# Patient Record
Sex: FEMALE | Race: BLACK OR AFRICAN AMERICAN | Employment: FULL TIME
[De-identification: names, ages, dates, MRNs, and addresses within clinical notes are randomized per-mention and may not be internally consistent; named-entity substitution may affect disease eponyms.]

---

## 2017-01-06 ENCOUNTER — PROCEDURE VISIT (OUTPATIENT)
Dept: PODIATRY | Facility: CLINIC | Age: 47
End: 2017-01-06

## 2017-01-06 VITALS
HEART RATE: 82 BPM | SYSTOLIC BLOOD PRESSURE: 125 MMHG | WEIGHT: 175 LBS | HEIGHT: 62 IN | BODY MASS INDEX: 32.2 KG/M2 | DIASTOLIC BLOOD PRESSURE: 75 MMHG

## 2017-01-06 DIAGNOSIS — M25.572 ACUTE LEFT ANKLE PAIN: ICD-10-CM

## 2017-01-06 DIAGNOSIS — M25.473 ANKLE EDEMA: ICD-10-CM

## 2017-01-06 DIAGNOSIS — S82.62XD CLOSED DISPLACED FRACTURE OF LATERAL MALLEOLUS OF LEFT FIBULA WITH ROUTINE HEALING, SUBSEQUENT ENCOUNTER: Primary | ICD-10-CM

## 2017-01-06 PROCEDURE — 73610 X-RAY EXAM OF ANKLE: CPT | Performed by: PODIATRIST

## 2017-01-06 PROCEDURE — 99213 OFFICE O/P EST LOW 20 MIN: CPT | Performed by: PODIATRIST

## 2017-01-06 RX ORDER — HYDROCODONE BITARTRATE AND ACETAMINOPHEN 5; 325 MG/1; MG/1
2 TABLET ORAL EVERY 6 HOURS PRN
Qty: 30 TABLET | Refills: 0 | Status: SHIPPED | OUTPATIENT
Start: 2017-01-06 | End: 2017-06-07 | Stop reason: SDUPTHER

## 2017-01-06 ASSESSMENT — ENCOUNTER SYMPTOMS
COLOR CHANGE: 0
NAUSEA: 0
BACK PAIN: 0
SHORTNESS OF BREATH: 0
DIARRHEA: 0

## 2017-02-02 ENCOUNTER — OFFICE VISIT (OUTPATIENT)
Dept: PODIATRY | Facility: CLINIC | Age: 47
End: 2017-02-02

## 2017-02-02 VITALS
HEIGHT: 62 IN | HEART RATE: 91 BPM | WEIGHT: 170 LBS | BODY MASS INDEX: 31.28 KG/M2 | SYSTOLIC BLOOD PRESSURE: 147 MMHG | DIASTOLIC BLOOD PRESSURE: 95 MMHG

## 2017-02-02 DIAGNOSIS — S82.62XD CLOSED DISPLACED FRACTURE OF LATERAL MALLEOLUS OF LEFT FIBULA WITH ROUTINE HEALING, SUBSEQUENT ENCOUNTER: Primary | ICD-10-CM

## 2017-02-02 DIAGNOSIS — M25.572 ACUTE LEFT ANKLE PAIN: ICD-10-CM

## 2017-02-02 DIAGNOSIS — M25.473 ANKLE EDEMA: ICD-10-CM

## 2017-02-02 PROCEDURE — 99213 OFFICE O/P EST LOW 20 MIN: CPT | Performed by: PODIATRIST

## 2017-02-02 PROCEDURE — 73610 X-RAY EXAM OF ANKLE: CPT | Performed by: PODIATRIST

## 2017-02-02 ASSESSMENT — ENCOUNTER SYMPTOMS
SHORTNESS OF BREATH: 0
COLOR CHANGE: 0
BACK PAIN: 0
NAUSEA: 0
DIARRHEA: 0

## 2017-02-16 ENCOUNTER — TELEPHONE (OUTPATIENT)
Dept: PODIATRY | Facility: CLINIC | Age: 47
End: 2017-02-16

## 2017-02-16 ENCOUNTER — OFFICE VISIT (OUTPATIENT)
Dept: PODIATRY | Facility: CLINIC | Age: 47
End: 2017-02-16

## 2017-02-16 VITALS — HEIGHT: 62 IN | WEIGHT: 170 LBS | BODY MASS INDEX: 31.28 KG/M2

## 2017-02-16 DIAGNOSIS — S82.62XD CLOSED DISPLACED FRACTURE OF LATERAL MALLEOLUS OF LEFT FIBULA WITH ROUTINE HEALING, SUBSEQUENT ENCOUNTER: Primary | ICD-10-CM

## 2017-02-16 DIAGNOSIS — M25.473 ANKLE EDEMA: ICD-10-CM

## 2017-02-16 DIAGNOSIS — M25.572 ACUTE LEFT ANKLE PAIN: ICD-10-CM

## 2017-02-16 PROCEDURE — 99213 OFFICE O/P EST LOW 20 MIN: CPT | Performed by: PODIATRIST

## 2017-02-16 PROCEDURE — 73610 X-RAY EXAM OF ANKLE: CPT | Performed by: PODIATRIST

## 2017-02-16 ASSESSMENT — ENCOUNTER SYMPTOMS
NAUSEA: 0
BACK PAIN: 0
COLOR CHANGE: 0
SHORTNESS OF BREATH: 0
DIARRHEA: 0

## 2017-03-16 ENCOUNTER — OFFICE VISIT (OUTPATIENT)
Dept: PODIATRY | Age: 47
End: 2017-03-16
Payer: COMMERCIAL

## 2017-03-16 VITALS
WEIGHT: 183 LBS | HEART RATE: 105 BPM | BODY MASS INDEX: 33.68 KG/M2 | SYSTOLIC BLOOD PRESSURE: 140 MMHG | DIASTOLIC BLOOD PRESSURE: 95 MMHG | HEIGHT: 62 IN

## 2017-03-16 DIAGNOSIS — M25.473 ANKLE EDEMA: ICD-10-CM

## 2017-03-16 DIAGNOSIS — S82.62XG CLOSED DISPLACED FRACTURE OF LATERAL MALLEOLUS OF LEFT FIBULA WITH DELAYED HEALING, SUBSEQUENT ENCOUNTER: Primary | ICD-10-CM

## 2017-03-16 DIAGNOSIS — M25.572 ACUTE LEFT ANKLE PAIN: ICD-10-CM

## 2017-03-16 PROCEDURE — 99213 OFFICE O/P EST LOW 20 MIN: CPT | Performed by: PODIATRIST

## 2017-03-16 PROCEDURE — 73610 X-RAY EXAM OF ANKLE: CPT | Performed by: PODIATRIST

## 2017-03-16 RX ORDER — HYDROCODONE BITARTRATE AND ACETAMINOPHEN 5; 325 MG/1; MG/1
2 TABLET ORAL EVERY 6 HOURS PRN
Qty: 30 TABLET | Refills: 0 | Status: SHIPPED | OUTPATIENT
Start: 2017-03-16 | End: 2017-05-25 | Stop reason: SDUPTHER

## 2017-03-16 ASSESSMENT — ENCOUNTER SYMPTOMS
DIARRHEA: 0
COLOR CHANGE: 0
NAUSEA: 0
BACK PAIN: 0
SHORTNESS OF BREATH: 0

## 2017-03-17 DIAGNOSIS — S76.219A GROIN STRAIN: ICD-10-CM

## 2017-03-17 DIAGNOSIS — S33.9XXS SPRAIN, LUMBOSACRAL, SEQUELA: ICD-10-CM

## 2017-03-20 RX ORDER — CYCLOBENZAPRINE HCL 10 MG
TABLET ORAL
Qty: 30 TABLET | Refills: 0 | Status: SHIPPED | OUTPATIENT
Start: 2017-03-20 | End: 2017-06-07 | Stop reason: SDUPTHER

## 2017-04-13 ENCOUNTER — TELEPHONE (OUTPATIENT)
Dept: PODIATRY | Age: 47
End: 2017-04-13

## 2017-04-13 ENCOUNTER — OFFICE VISIT (OUTPATIENT)
Dept: PODIATRY | Age: 47
End: 2017-04-13
Payer: COMMERCIAL

## 2017-04-13 VITALS
HEART RATE: 83 BPM | DIASTOLIC BLOOD PRESSURE: 91 MMHG | WEIGHT: 183 LBS | HEIGHT: 62 IN | SYSTOLIC BLOOD PRESSURE: 144 MMHG | BODY MASS INDEX: 33.68 KG/M2

## 2017-04-13 DIAGNOSIS — S82.62XG CLOSED DISPLACED FRACTURE OF LATERAL MALLEOLUS OF LEFT FIBULA WITH DELAYED HEALING, SUBSEQUENT ENCOUNTER: Primary | ICD-10-CM

## 2017-04-13 DIAGNOSIS — M25.572 ACUTE LEFT ANKLE PAIN: ICD-10-CM

## 2017-04-13 DIAGNOSIS — M25.473 ANKLE EDEMA: ICD-10-CM

## 2017-04-13 PROCEDURE — 73610 X-RAY EXAM OF ANKLE: CPT | Performed by: PODIATRIST

## 2017-04-13 PROCEDURE — L4350 ANKLE CONTROL ORTHO PRE OTS: HCPCS | Performed by: PODIATRIST

## 2017-04-13 PROCEDURE — 99213 OFFICE O/P EST LOW 20 MIN: CPT | Performed by: PODIATRIST

## 2017-04-13 ASSESSMENT — ENCOUNTER SYMPTOMS
SHORTNESS OF BREATH: 0
NAUSEA: 0
BACK PAIN: 0
COLOR CHANGE: 0
DIARRHEA: 0

## 2017-04-27 ENCOUNTER — OFFICE VISIT (OUTPATIENT)
Dept: PODIATRY | Age: 47
End: 2017-04-27
Payer: COMMERCIAL

## 2017-04-27 VITALS
WEIGHT: 183 LBS | HEIGHT: 62 IN | HEART RATE: 99 BPM | SYSTOLIC BLOOD PRESSURE: 128 MMHG | DIASTOLIC BLOOD PRESSURE: 90 MMHG | BODY MASS INDEX: 33.68 KG/M2

## 2017-04-27 DIAGNOSIS — M25.572 ACUTE LEFT ANKLE PAIN: ICD-10-CM

## 2017-04-27 DIAGNOSIS — S82.62XG CLOSED DISPLACED FRACTURE OF LATERAL MALLEOLUS OF LEFT FIBULA WITH DELAYED HEALING, SUBSEQUENT ENCOUNTER: Primary | ICD-10-CM

## 2017-04-27 DIAGNOSIS — M25.473 ANKLE EDEMA: ICD-10-CM

## 2017-04-27 PROCEDURE — 73610 X-RAY EXAM OF ANKLE: CPT | Performed by: PODIATRIST

## 2017-04-27 PROCEDURE — 99213 OFFICE O/P EST LOW 20 MIN: CPT | Performed by: PODIATRIST

## 2017-04-28 ASSESSMENT — ENCOUNTER SYMPTOMS
COLOR CHANGE: 0
NAUSEA: 0
DIARRHEA: 0
SHORTNESS OF BREATH: 0
BACK PAIN: 0

## 2017-05-25 ENCOUNTER — OFFICE VISIT (OUTPATIENT)
Dept: PODIATRY | Age: 47
End: 2017-05-25
Payer: COMMERCIAL

## 2017-05-25 VITALS
DIASTOLIC BLOOD PRESSURE: 97 MMHG | BODY MASS INDEX: 33.68 KG/M2 | SYSTOLIC BLOOD PRESSURE: 131 MMHG | HEIGHT: 62 IN | HEART RATE: 110 BPM | WEIGHT: 183 LBS

## 2017-05-25 DIAGNOSIS — M25.572 ACUTE LEFT ANKLE PAIN: ICD-10-CM

## 2017-05-25 DIAGNOSIS — S82.62XG CLOSED DISPLACED FRACTURE OF LATERAL MALLEOLUS OF LEFT FIBULA WITH DELAYED HEALING, SUBSEQUENT ENCOUNTER: Primary | ICD-10-CM

## 2017-05-25 DIAGNOSIS — M25.473 ANKLE EDEMA: ICD-10-CM

## 2017-05-25 PROCEDURE — 99213 OFFICE O/P EST LOW 20 MIN: CPT | Performed by: PODIATRIST

## 2017-05-25 RX ORDER — HYDROCODONE BITARTRATE AND ACETAMINOPHEN 5; 325 MG/1; MG/1
2 TABLET ORAL EVERY 6 HOURS PRN
Qty: 30 TABLET | Refills: 0 | Status: SHIPPED | OUTPATIENT
Start: 2017-05-25 | End: 2017-07-25 | Stop reason: SDUPTHER

## 2017-05-25 ASSESSMENT — ENCOUNTER SYMPTOMS
NAUSEA: 0
DIARRHEA: 0
COLOR CHANGE: 0
SHORTNESS OF BREATH: 0
BACK PAIN: 0

## 2017-06-07 ENCOUNTER — OFFICE VISIT (OUTPATIENT)
Dept: FAMILY MEDICINE CLINIC | Age: 47
End: 2017-06-07
Payer: COMMERCIAL

## 2017-06-07 VITALS
BODY MASS INDEX: 35.51 KG/M2 | SYSTOLIC BLOOD PRESSURE: 150 MMHG | WEIGHT: 193 LBS | HEART RATE: 87 BPM | DIASTOLIC BLOOD PRESSURE: 104 MMHG | HEIGHT: 62 IN

## 2017-06-07 DIAGNOSIS — Z87.81 HX OF FRACTURE OF ANKLE: ICD-10-CM

## 2017-06-07 DIAGNOSIS — M25.50 ARTHRALGIA OF MULTIPLE JOINTS: ICD-10-CM

## 2017-06-07 DIAGNOSIS — I10 ESSENTIAL HYPERTENSION: Primary | ICD-10-CM

## 2017-06-07 DIAGNOSIS — Z12.39 BREAST CANCER SCREENING: ICD-10-CM

## 2017-06-07 DIAGNOSIS — E01.0 THYROMEGALY: ICD-10-CM

## 2017-06-07 DIAGNOSIS — S33.9XXS SPRAIN, LUMBOSACRAL, SEQUELA: ICD-10-CM

## 2017-06-07 DIAGNOSIS — E66.09 NON MORBID OBESITY DUE TO EXCESS CALORIES: ICD-10-CM

## 2017-06-07 DIAGNOSIS — E78.2 MIXED HYPERLIPIDEMIA: ICD-10-CM

## 2017-06-07 PROCEDURE — 99215 OFFICE O/P EST HI 40 MIN: CPT | Performed by: FAMILY MEDICINE

## 2017-06-07 RX ORDER — SIMVASTATIN 20 MG
20 TABLET ORAL EVERY EVENING
Qty: 30 TABLET | Refills: 3 | Status: SHIPPED | OUTPATIENT
Start: 2017-06-07 | End: 2017-12-20 | Stop reason: SDUPTHER

## 2017-06-07 RX ORDER — CYCLOBENZAPRINE HCL 10 MG
10 TABLET ORAL NIGHTLY PRN
Qty: 30 TABLET | Refills: 1 | Status: SHIPPED | OUTPATIENT
Start: 2017-06-07 | End: 2017-09-25 | Stop reason: SDUPTHER

## 2017-06-07 RX ORDER — VERAPAMIL HYDROCHLORIDE 360 MG/1
360 CAPSULE, DELAYED RELEASE PELLETS ORAL NIGHTLY
Qty: 30 CAPSULE | Refills: 3 | Status: SHIPPED | OUTPATIENT
Start: 2017-06-07 | End: 2017-12-20 | Stop reason: SDUPTHER

## 2017-06-07 RX ORDER — HYDRALAZINE HYDROCHLORIDE 50 MG/1
50 TABLET, FILM COATED ORAL 3 TIMES DAILY
Qty: 90 TABLET | Refills: 3 | Status: SHIPPED | OUTPATIENT
Start: 2017-06-07 | End: 2017-12-18 | Stop reason: ALTCHOICE

## 2017-06-07 ASSESSMENT — PATIENT HEALTH QUESTIONNAIRE - PHQ9
1. LITTLE INTEREST OR PLEASURE IN DOING THINGS: 0
SUM OF ALL RESPONSES TO PHQ QUESTIONS 1-9: 0
SUM OF ALL RESPONSES TO PHQ9 QUESTIONS 1 & 2: 0
2. FEELING DOWN, DEPRESSED OR HOPELESS: 0

## 2017-06-08 ASSESSMENT — ENCOUNTER SYMPTOMS
TROUBLE SWALLOWING: 0
CHEST TIGHTNESS: 0
CONSTIPATION: 0
SORE THROAT: 0
COUGH: 0
RHINORRHEA: 0
ABDOMINAL PAIN: 0
NAUSEA: 0
BACK PAIN: 0
SHORTNESS OF BREATH: 0
DIARRHEA: 0

## 2017-06-26 ENCOUNTER — HOSPITAL ENCOUNTER (OUTPATIENT)
Age: 47
Discharge: HOME | End: 2017-06-26
Payer: COMMERCIAL

## 2017-06-26 DIAGNOSIS — M25.50 ARTHRALGIA OF MULTIPLE JOINTS: ICD-10-CM

## 2017-06-26 DIAGNOSIS — E01.0 THYROMEGALY: ICD-10-CM

## 2017-06-26 LAB
RHEUMATOID FACTOR: 11.2 IU/ML
SEDIMENTATION RATE, ERYTHROCYTE: 21 MM (ref 0–20)
THYROXINE, FREE: 0.68 NG/DL (ref 0.93–1.7)
TSH SERPL DL<=0.05 MIU/L-ACNC: 15.11 MIU/L (ref 0.3–5)

## 2017-06-26 PROCEDURE — 36415 COLL VENOUS BLD VENIPUNCTURE: CPT

## 2017-06-26 PROCEDURE — 85651 RBC SED RATE NONAUTOMATED: CPT

## 2017-06-26 PROCEDURE — 84443 ASSAY THYROID STIM HORMONE: CPT

## 2017-06-26 PROCEDURE — 86431 RHEUMATOID FACTOR QUANT: CPT

## 2017-06-26 PROCEDURE — 84439 ASSAY OF FREE THYROXINE: CPT

## 2017-06-26 PROCEDURE — 86038 ANTINUCLEAR ANTIBODIES: CPT

## 2017-06-27 LAB — ANTI-NUCLEAR ANTIBODY (ANA): NEGATIVE

## 2017-06-29 ENCOUNTER — HOSPITAL ENCOUNTER (OUTPATIENT)
Dept: MAMMOGRAPHY | Age: 47
Discharge: HOME | End: 2017-06-29
Payer: COMMERCIAL

## 2017-06-29 DIAGNOSIS — Z12.39 BREAST CANCER SCREENING: ICD-10-CM

## 2017-06-29 PROCEDURE — G0202 SCR MAMMO BI INCL CAD: HCPCS

## 2017-07-13 ENCOUNTER — OFFICE VISIT (OUTPATIENT)
Dept: FAMILY MEDICINE CLINIC | Age: 47
End: 2017-07-13
Payer: COMMERCIAL

## 2017-07-13 VITALS
HEIGHT: 62 IN | DIASTOLIC BLOOD PRESSURE: 60 MMHG | BODY MASS INDEX: 34.01 KG/M2 | WEIGHT: 184.8 LBS | SYSTOLIC BLOOD PRESSURE: 130 MMHG

## 2017-07-13 DIAGNOSIS — E01.0 THYROMEGALY: ICD-10-CM

## 2017-07-13 DIAGNOSIS — E03.8 OTHER SPECIFIED HYPOTHYROIDISM: Primary | ICD-10-CM

## 2017-07-13 DIAGNOSIS — S76.211D GROIN STRAIN, RIGHT, SUBSEQUENT ENCOUNTER: ICD-10-CM

## 2017-07-13 DIAGNOSIS — S33.9XXS SPRAIN, LUMBOSACRAL, SEQUELA: ICD-10-CM

## 2017-07-13 DIAGNOSIS — E78.2 MIXED HYPERLIPIDEMIA: ICD-10-CM

## 2017-07-13 DIAGNOSIS — S82.62XG CLOSED DISPLACED FRACTURE OF LATERAL MALLEOLUS OF LEFT FIBULA WITH DELAYED HEALING, SUBSEQUENT ENCOUNTER: ICD-10-CM

## 2017-07-13 DIAGNOSIS — M25.50 ARTHRALGIA OF MULTIPLE JOINTS: ICD-10-CM

## 2017-07-13 DIAGNOSIS — I10 ESSENTIAL HYPERTENSION: ICD-10-CM

## 2017-07-13 PROCEDURE — 99214 OFFICE O/P EST MOD 30 MIN: CPT | Performed by: FAMILY MEDICINE

## 2017-07-13 RX ORDER — LEVOTHYROXINE SODIUM 0.07 MG/1
75 TABLET ORAL DAILY
Qty: 30 TABLET | Refills: 3 | Status: SHIPPED | OUTPATIENT
Start: 2017-07-13 | End: 2017-09-25 | Stop reason: DRUGHIGH

## 2017-07-13 RX ORDER — HYDROCODONE BITARTRATE AND ACETAMINOPHEN 5; 325 MG/1; MG/1
1 TABLET ORAL 2 TIMES DAILY PRN
Qty: 30 TABLET | Refills: 0 | Status: SHIPPED | OUTPATIENT
Start: 2017-07-13 | End: 2017-07-25 | Stop reason: CLARIF

## 2017-07-19 ENCOUNTER — HOSPITAL ENCOUNTER (OUTPATIENT)
Dept: ULTRASOUND IMAGING | Age: 47
Discharge: HOME | End: 2017-07-19
Payer: COMMERCIAL

## 2017-07-19 DIAGNOSIS — E03.8 OTHER SPECIFIED HYPOTHYROIDISM: ICD-10-CM

## 2017-07-19 DIAGNOSIS — E01.0 THYROMEGALY: ICD-10-CM

## 2017-07-19 PROCEDURE — 76536 US EXAM OF HEAD AND NECK: CPT

## 2017-07-25 RX ORDER — HYDROCODONE BITARTRATE AND ACETAMINOPHEN 5; 325 MG/1; MG/1
1 TABLET ORAL 2 TIMES DAILY PRN
Qty: 14 TABLET | Refills: 0 | OUTPATIENT
Start: 2017-07-25 | End: 2017-07-25 | Stop reason: CLARIF

## 2017-07-25 RX ORDER — HYDROCODONE BITARTRATE AND ACETAMINOPHEN 5; 325 MG/1; MG/1
1 TABLET ORAL 2 TIMES DAILY PRN
Qty: 14 TABLET | Refills: 0 | Status: SHIPPED | OUTPATIENT
Start: 2017-07-25 | End: 2017-09-25 | Stop reason: SDUPTHER

## 2017-07-28 ASSESSMENT — ENCOUNTER SYMPTOMS
TROUBLE SWALLOWING: 0
SHORTNESS OF BREATH: 0
CHEST TIGHTNESS: 0
NAUSEA: 0
COUGH: 0
BACK PAIN: 1
CONSTIPATION: 0
ABDOMINAL PAIN: 0
DIARRHEA: 0
RHINORRHEA: 0
SORE THROAT: 0

## 2017-09-01 DIAGNOSIS — S82.62XG CLOSED DISPLACED FRACTURE OF LATERAL MALLEOLUS OF LEFT FIBULA WITH DELAYED HEALING, SUBSEQUENT ENCOUNTER: ICD-10-CM

## 2017-09-01 DIAGNOSIS — S33.9XXS SPRAIN, LUMBOSACRAL, SEQUELA: ICD-10-CM

## 2017-09-01 DIAGNOSIS — M25.50 ARTHRALGIA OF MULTIPLE JOINTS: ICD-10-CM

## 2017-09-01 RX ORDER — IBUPROFEN 800 MG/1
800 TABLET ORAL EVERY 8 HOURS PRN
Qty: 30 TABLET | Refills: 0 | Status: SHIPPED | OUTPATIENT
Start: 2017-09-01 | End: 2017-12-18 | Stop reason: ALTCHOICE

## 2017-09-14 DIAGNOSIS — M25.50 ARTHRALGIA OF MULTIPLE JOINTS: Primary | ICD-10-CM

## 2017-09-21 ENCOUNTER — HOSPITAL ENCOUNTER (OUTPATIENT)
Age: 47
Discharge: HOME | End: 2017-09-21
Payer: COMMERCIAL

## 2017-09-21 DIAGNOSIS — E03.9 HYPOTHYROIDISM, UNSPECIFIED TYPE: ICD-10-CM

## 2017-09-21 DIAGNOSIS — E01.0 THYROMEGALY: ICD-10-CM

## 2017-09-21 LAB
THYROXINE, FREE: 0.83 NG/DL (ref 0.93–1.7)
TSH SERPL DL<=0.05 MIU/L-ACNC: 5.81 MIU/L (ref 0.3–5)

## 2017-09-21 PROCEDURE — 84439 ASSAY OF FREE THYROXINE: CPT

## 2017-09-21 PROCEDURE — 84443 ASSAY THYROID STIM HORMONE: CPT

## 2017-09-21 PROCEDURE — 36415 COLL VENOUS BLD VENIPUNCTURE: CPT

## 2017-09-25 ENCOUNTER — OFFICE VISIT (OUTPATIENT)
Dept: FAMILY MEDICINE CLINIC | Age: 47
End: 2017-09-25
Payer: COMMERCIAL

## 2017-09-25 VITALS
RESPIRATION RATE: 16 BRPM | TEMPERATURE: 98.3 F | HEART RATE: 78 BPM | SYSTOLIC BLOOD PRESSURE: 128 MMHG | BODY MASS INDEX: 33.09 KG/M2 | WEIGHT: 179.8 LBS | DIASTOLIC BLOOD PRESSURE: 68 MMHG | HEIGHT: 62 IN

## 2017-09-25 DIAGNOSIS — R26.81 UNSTABLE GAIT: ICD-10-CM

## 2017-09-25 DIAGNOSIS — S73.101D HIP SPRAIN, RIGHT, SUBSEQUENT ENCOUNTER: ICD-10-CM

## 2017-09-25 DIAGNOSIS — M25.50 ARTHRALGIA OF MULTIPLE JOINTS: ICD-10-CM

## 2017-09-25 DIAGNOSIS — E03.9 HYPOTHYROIDISM, UNSPECIFIED TYPE: ICD-10-CM

## 2017-09-25 DIAGNOSIS — I10 ESSENTIAL HYPERTENSION: ICD-10-CM

## 2017-09-25 DIAGNOSIS — M47.817 DJD (DEGENERATIVE JOINT DISEASE), LUMBOSACRAL: Primary | ICD-10-CM

## 2017-09-25 DIAGNOSIS — S33.9XXS SPRAIN, LUMBOSACRAL, SEQUELA: ICD-10-CM

## 2017-09-25 DIAGNOSIS — S76.211D GROIN STRAIN, RIGHT, SUBSEQUENT ENCOUNTER: ICD-10-CM

## 2017-09-25 PROCEDURE — 99214 OFFICE O/P EST MOD 30 MIN: CPT | Performed by: FAMILY MEDICINE

## 2017-09-25 RX ORDER — LEVOTHYROXINE SODIUM 0.1 MG/1
100 TABLET ORAL DAILY
Qty: 30 TABLET | Refills: 3 | Status: SHIPPED | OUTPATIENT
Start: 2017-09-25 | End: 2018-01-27 | Stop reason: SDUPTHER

## 2017-09-25 RX ORDER — CYCLOBENZAPRINE HCL 10 MG
TABLET ORAL
Qty: 30 TABLET | Refills: 0 | Status: SHIPPED | OUTPATIENT
Start: 2017-09-25 | End: 2017-12-27 | Stop reason: SDUPTHER

## 2017-09-25 RX ORDER — HYDROCODONE BITARTRATE AND ACETAMINOPHEN 5; 325 MG/1; MG/1
1 TABLET ORAL 2 TIMES DAILY PRN
Qty: 20 TABLET | Refills: 0 | Status: SHIPPED | OUTPATIENT
Start: 2017-09-25 | End: 2017-12-18 | Stop reason: SDUPTHER

## 2017-09-30 ASSESSMENT — ENCOUNTER SYMPTOMS
SHORTNESS OF BREATH: 0
DIARRHEA: 0
ABDOMINAL PAIN: 0
NAUSEA: 0
SORE THROAT: 0
RHINORRHEA: 0
COUGH: 0
TROUBLE SWALLOWING: 0
CONSTIPATION: 0
BACK PAIN: 0
CHEST TIGHTNESS: 0

## 2017-12-18 ENCOUNTER — OFFICE VISIT (OUTPATIENT)
Dept: FAMILY MEDICINE CLINIC | Age: 47
End: 2017-12-18
Payer: COMMERCIAL

## 2017-12-18 VITALS
BODY MASS INDEX: 32.68 KG/M2 | SYSTOLIC BLOOD PRESSURE: 128 MMHG | HEIGHT: 62 IN | HEART RATE: 76 BPM | DIASTOLIC BLOOD PRESSURE: 89 MMHG | WEIGHT: 177.6 LBS

## 2017-12-18 DIAGNOSIS — M25.50 ARTHRALGIA OF MULTIPLE JOINTS: ICD-10-CM

## 2017-12-18 DIAGNOSIS — E66.09 NON MORBID OBESITY DUE TO EXCESS CALORIES: ICD-10-CM

## 2017-12-18 DIAGNOSIS — I10 ESSENTIAL HYPERTENSION: Primary | ICD-10-CM

## 2017-12-18 DIAGNOSIS — M32.19 OTHER SYSTEMIC LUPUS ERYTHEMATOSUS WITH OTHER ORGAN INVOLVEMENT (HCC): ICD-10-CM

## 2017-12-18 DIAGNOSIS — E78.2 MIXED HYPERLIPIDEMIA: ICD-10-CM

## 2017-12-18 DIAGNOSIS — E03.9 HYPOTHYROIDISM, UNSPECIFIED TYPE: ICD-10-CM

## 2017-12-18 DIAGNOSIS — M47.817 DJD (DEGENERATIVE JOINT DISEASE), LUMBOSACRAL: ICD-10-CM

## 2017-12-18 DIAGNOSIS — S76.211D GROIN STRAIN, RIGHT, SUBSEQUENT ENCOUNTER: ICD-10-CM

## 2017-12-18 PROBLEM — M32.9 SYSTEMIC LUPUS ERYTHEMATOSUS (HCC): Status: ACTIVE | Noted: 2017-12-18

## 2017-12-18 PROCEDURE — 99214 OFFICE O/P EST MOD 30 MIN: CPT | Performed by: FAMILY MEDICINE

## 2017-12-18 RX ORDER — HYDROCODONE BITARTRATE AND ACETAMINOPHEN 5; 325 MG/1; MG/1
1 TABLET ORAL 2 TIMES DAILY PRN
Qty: 20 TABLET | Refills: 0 | Status: SHIPPED | OUTPATIENT
Start: 2017-12-18

## 2017-12-18 RX ORDER — METOPROLOL TARTRATE 50 MG/1
50 TABLET, FILM COATED ORAL 2 TIMES DAILY
Qty: 60 TABLET | Refills: 3 | Status: SHIPPED | OUTPATIENT
Start: 2017-12-18 | End: 2018-03-19 | Stop reason: SDUPTHER

## 2017-12-18 RX ORDER — HYDROXYCHLOROQUINE SULFATE 200 MG/1
TABLET, FILM COATED ORAL
Refills: 3 | COMMUNITY
Start: 2017-11-28 | End: 2018-03-19 | Stop reason: SDUPTHER

## 2017-12-18 ASSESSMENT — ENCOUNTER SYMPTOMS
COUGH: 0
CHEST TIGHTNESS: 0
CONSTIPATION: 0
SORE THROAT: 0
NAUSEA: 0
SHORTNESS OF BREATH: 0
ABDOMINAL PAIN: 0
BACK PAIN: 1
RHINORRHEA: 0
TROUBLE SWALLOWING: 0
DIARRHEA: 0

## 2017-12-18 NOTE — PROGRESS NOTES
palpable. Musculoskeletal:   Legs show no edema. Both ankles move well. There is mild tenderness on flexion and extension of both knees. SLR almost 60°. There is ongoing tenderness in the right groin along the medial aspect. Internal and external rotation of the hip is full. She is able to lie down and sit up. This tenderness over the lumbar spine and paraspinal muscles. No tremors noted.  is strong. Hands and fingers show no swelling or tenderness. Wrist movements are full. Both elbows move well. No specific tenderness is noted. Shoulder movements appear full. Her gait is slow but appears stable. Lymphadenopathy:     She has no cervical adenopathy. Neurological: She is alert and oriented to person, place, and time. Coordination normal.   Skin: Skin is warm and dry. No rash noted. Psychiatric: She has a normal mood and affect. Her behavior is normal. Thought content normal.   Nursing note and vitals reviewed. /89 (Site: Right Arm, Position: Sitting, Cuff Size: Large Adult)   Pulse 76   Ht 5' 2\" (1.575 m)   Wt 177 lb 9.6 oz (80.6 kg)   LMP  (LMP Unknown)   Breastfeeding? No   BMI 32.48 kg/m²     Assessment:      1. Essential hypertension  metoprolol tartrate (LOPRESSOR) 50 MG tablet   2. Mixed hyperlipidemia     3. Hypothyroidism, unspecified type  T4, Free    TSH without Reflex   4. Arthralgia of multiple joints     5. Other systemic lupus erythematosus with other organ involvement ([facility] [address]     6. DJD (degenerative joint disease), lumbosacral  HYDROcodone-acetaminophen (NORCO) 5-325 MG per tablet   7. Groin strain, right, subsequent encounter  HYDROcodone-acetaminophen (NORCO) 5-325 MG per tablet   8. Non morbid obesity due to excess calories         Plan:      Return in about 3 months (around 3/18/2018).     Orders Placed This Encounter   Procedures    T4, Free     Standing Status:   Future     Standing Expiration Date:   12/18/2018    TSH without Reflex     Standing Status: maintenance  6. Continue current medications, diet and exercise. Completed Refills   Requested Prescriptions     Signed Prescriptions Disp Refills    metoprolol tartrate (LOPRESSOR) 50 MG tablet 60 tablet 3     Sig: Take 1 tablet by mouth 2 times daily    HYDROcodone-acetaminophen (NORCO) 5-325 MG per tablet 20 tablet 0     Sig: Take 1 tablet by mouth 2 times daily as needed for Pain .      Electronically signed by Stacy Carbajal MD on 12/18/2017 at 2:39 PM

## 2017-12-18 NOTE — PROGRESS NOTES
Chronic Disease Visit Information    BP Readings from Last 3 Encounters:   09/25/17 128/68   07/13/17 130/60   06/07/17 (!) 150/104          Hemoglobin A1C (%)   Date Value   07/14/2015 5.6     LDL Cholesterol (mg/dL)   Date Value   03/09/2017 112     HDL (mg/dL)   Date Value   03/09/2017 77     BUN (mg/dL)   Date Value   03/09/2017 13     CREATININE (mg/dL)   Date Value   03/09/2017 0.60     Glucose (mg/dL)   Date Value   03/09/2017 109 (H)   05/05/2012 88            Have you changed or started any medications since your last visit including any over-the-counter medicines, vitamins, or herbal medicines? no   Are you having any side effects from any of your medications? -  no  Have you stopped taking any of your medications? Is so, why? -  no    Have you seen any other physician or provider since your last visit? Yes - Records Obtained  Have you had any other diagnostic tests since your last visit? Yes - Records Obtained  Have you been seen in the emergency room and/or had an admission to a hospital since we last saw you? No  Have you had your annual diabetic retinal (eye) exam? No  Have you had your routine dental cleaning in the past 6 months? no    Have you activated your [employer] account? If not, what are your barriers?  Yes     Patient Care Team:  Durga Shearer MD as PCP - General (Family Medicine)  Durga Shearer MD as PCP - S Attributed Provider         Medical History Review  Past Medical, Family, and Social History reviewed and does contribute to the patient presenting condition    Health Maintenance   Topic Date Due    Cervical cancer screen  06/18/2016    Flu vaccine (1) 09/03/2018 (Originally 9/1/2017)    DTaP/Tdap/Td vaccine (1 - Tdap) 01/19/2019 (Originally 7/14/1987)    HIV screen  01/19/2019 (Originally 7/14/1983)    Breast cancer screen  06/29/2018    Diabetes screen  07/14/2018    Lipid screen  03/09/2022

## 2017-12-20 DIAGNOSIS — I10 ESSENTIAL HYPERTENSION: ICD-10-CM

## 2017-12-20 DIAGNOSIS — E78.2 MIXED HYPERLIPIDEMIA: ICD-10-CM

## 2017-12-21 DIAGNOSIS — E03.9 HYPOTHYROIDISM, UNSPECIFIED TYPE: ICD-10-CM

## 2017-12-21 LAB — T4 FREE: 0.99

## 2017-12-22 RX ORDER — SIMVASTATIN 20 MG
20 TABLET ORAL EVERY EVENING
Qty: 30 TABLET | Refills: 0 | Status: SHIPPED | OUTPATIENT
Start: 2017-12-22 | End: 2018-01-27 | Stop reason: SDUPTHER

## 2017-12-22 RX ORDER — VERAPAMIL HYDROCHLORIDE 360 MG/1
CAPSULE, DELAYED RELEASE PELLETS ORAL
Qty: 30 CAPSULE | Refills: 0 | Status: SHIPPED | OUTPATIENT
Start: 2017-12-22 | End: 2018-01-27 | Stop reason: SDUPTHER

## 2017-12-26 DIAGNOSIS — E03.9 HYPOTHYROIDISM, UNSPECIFIED TYPE: ICD-10-CM

## 2017-12-26 LAB — TSH SERPL DL<=0.05 MIU/L-ACNC: 0.9 UIU/ML

## 2017-12-27 DIAGNOSIS — S33.9XXS SPRAIN, LUMBOSACRAL, SEQUELA: ICD-10-CM

## 2017-12-27 RX ORDER — CYCLOBENZAPRINE HCL 10 MG
TABLET ORAL
Qty: 30 TABLET | Refills: 0 | Status: SHIPPED | OUTPATIENT
Start: 2017-12-27 | End: 2018-02-12 | Stop reason: SDUPTHER

## 2018-01-15 ENCOUNTER — TELEPHONE (OUTPATIENT)
Dept: FAMILY MEDICINE CLINIC | Age: 48
End: 2018-01-15

## 2018-01-27 DIAGNOSIS — I10 ESSENTIAL HYPERTENSION: ICD-10-CM

## 2018-01-27 DIAGNOSIS — E03.9 HYPOTHYROIDISM, UNSPECIFIED TYPE: ICD-10-CM

## 2018-01-27 DIAGNOSIS — E78.2 MIXED HYPERLIPIDEMIA: ICD-10-CM

## 2018-01-29 RX ORDER — SIMVASTATIN 20 MG
20 TABLET ORAL EVERY EVENING
Qty: 30 TABLET | Refills: 0 | Status: SHIPPED | OUTPATIENT
Start: 2018-01-29 | End: 2018-03-01 | Stop reason: SDUPTHER

## 2018-01-29 RX ORDER — VERAPAMIL HYDROCHLORIDE 360 MG/1
CAPSULE, DELAYED RELEASE PELLETS ORAL
Qty: 30 CAPSULE | Refills: 0 | Status: SHIPPED | OUTPATIENT
Start: 2018-01-29 | End: 2018-02-26 | Stop reason: SDUPTHER

## 2018-01-29 RX ORDER — LEVOTHYROXINE SODIUM 0.1 MG/1
100 TABLET ORAL DAILY
Qty: 30 TABLET | Refills: 0 | Status: SHIPPED | OUTPATIENT
Start: 2018-01-29 | End: 2018-02-26 | Stop reason: SDUPTHER

## 2018-02-12 DIAGNOSIS — S33.9XXS SPRAIN, LUMBOSACRAL, SEQUELA: ICD-10-CM

## 2018-02-12 RX ORDER — CYCLOBENZAPRINE HCL 10 MG
TABLET ORAL
Qty: 30 TABLET | Refills: 0 | Status: SHIPPED | OUTPATIENT
Start: 2018-02-12 | End: 2018-03-08 | Stop reason: SDUPTHER

## 2018-02-26 DIAGNOSIS — E03.9 HYPOTHYROIDISM, UNSPECIFIED TYPE: ICD-10-CM

## 2018-02-26 DIAGNOSIS — I10 ESSENTIAL HYPERTENSION: ICD-10-CM

## 2018-02-27 RX ORDER — VERAPAMIL HYDROCHLORIDE 360 MG/1
CAPSULE, DELAYED RELEASE PELLETS ORAL
Qty: 30 CAPSULE | Refills: 0 | Status: SHIPPED | OUTPATIENT
Start: 2018-02-27 | End: 2018-03-19 | Stop reason: SDUPTHER

## 2018-02-27 RX ORDER — LEVOTHYROXINE SODIUM 0.1 MG/1
100 TABLET ORAL DAILY
Qty: 30 TABLET | Refills: 0 | Status: SHIPPED | OUTPATIENT
Start: 2018-02-27 | End: 2018-03-19 | Stop reason: SDUPTHER

## 2018-03-01 DIAGNOSIS — E78.2 MIXED HYPERLIPIDEMIA: ICD-10-CM

## 2018-03-02 RX ORDER — SIMVASTATIN 20 MG
20 TABLET ORAL EVERY EVENING
Qty: 30 TABLET | Refills: 0 | Status: SHIPPED | OUTPATIENT
Start: 2018-03-02 | End: 2018-03-19 | Stop reason: SDUPTHER

## 2018-03-08 DIAGNOSIS — S33.9XXS SPRAIN, LUMBOSACRAL, SEQUELA: ICD-10-CM

## 2018-03-09 ENCOUNTER — APPOINTMENT (OUTPATIENT)
Dept: GENERAL RADIOLOGY | Age: 48
End: 2018-03-09
Payer: COMMERCIAL

## 2018-03-09 ENCOUNTER — HOSPITAL ENCOUNTER (OUTPATIENT)
Age: 48
Setting detail: OBSERVATION
Discharge: HOME | End: 2018-03-10
Attending: EMERGENCY MEDICINE | Admitting: EMERGENCY MEDICINE
Payer: COMMERCIAL

## 2018-03-09 DIAGNOSIS — R55 NEAR SYNCOPE: Primary | ICD-10-CM

## 2018-03-09 DIAGNOSIS — R94.31 PROLONGED QT INTERVAL: ICD-10-CM

## 2018-03-09 LAB
ABSOLUTE EOS #: <0.03 K/UL (ref 0–0.44)
ABSOLUTE IMMATURE GRANULOCYTE: <0.03 K/UL (ref 0–0.3)
ABSOLUTE LYMPH #: 0.78 K/UL (ref 1.1–3.7)
ABSOLUTE MONO #: 0.3 K/UL (ref 0.1–1.2)
ANION GAP SERPL CALCULATED.3IONS-SCNC: 12 MMOL/L (ref 9–17)
BASOPHILS # BLD: 0 % (ref 0–2)
BASOPHILS ABSOLUTE: <0.03 K/UL (ref 0–0.2)
BUN BLDV-MCNC: 14 MG/DL (ref 6–20)
BUN/CREAT BLD: ABNORMAL (ref 9–20)
CALCIUM SERPL-MCNC: 9.5 MG/DL (ref 8.6–10.4)
CHLORIDE BLD-SCNC: 101 MMOL/L (ref 98–107)
CO2: 26 MMOL/L (ref 20–31)
CREAT SERPL-MCNC: 0.54 MG/DL (ref 0.5–0.9)
DIFFERENTIAL TYPE: ABNORMAL
EOSINOPHILS RELATIVE PERCENT: 0 % (ref 1–4)
GFR AFRICAN AMERICAN: >60 ML/MIN
GFR NON-AFRICAN AMERICAN: >60 ML/MIN
GFR SERPL CREATININE-BSD FRML MDRD: ABNORMAL ML/MIN/{1.73_M2}
GFR SERPL CREATININE-BSD FRML MDRD: ABNORMAL ML/MIN/{1.73_M2}
GLUCOSE BLD-MCNC: 168 MG/DL (ref 70–99)
HCT VFR BLD CALC: 33.5 % (ref 36.3–47.1)
HEMOGLOBIN: 10.8 G/DL (ref 11.9–15.1)
IMMATURE GRANULOCYTES: 0 %
LYMPHOCYTES # BLD: 16 % (ref 24–43)
MCH RBC QN AUTO: 26.3 PG (ref 25.2–33.5)
MCHC RBC AUTO-ENTMCNC: 32.2 G/DL (ref 28.4–34.8)
MCV RBC AUTO: 81.5 FL (ref 82.6–102.9)
MONOCYTES # BLD: 6 % (ref 3–12)
NRBC AUTOMATED: 0 PER 100 WBC
PDW BLD-RTO: 13.2 % (ref 11.8–14.4)
PLATELET # BLD: 440 K/UL (ref 138–453)
PLATELET ESTIMATE: ABNORMAL
PMV BLD AUTO: 10.5 FL (ref 8.1–13.5)
POC TROPONIN I: 0 NG/ML (ref 0–0.1)
POC TROPONIN INTERP: NORMAL
POTASSIUM SERPL-SCNC: 3.5 MMOL/L (ref 3.7–5.3)
RBC # BLD: 4.11 M/UL (ref 3.95–5.11)
RBC # BLD: ABNORMAL 10*6/UL
SEG NEUTROPHILS: 78 % (ref 36–65)
SEGMENTED NEUTROPHILS ABSOLUTE COUNT: 3.67 K/UL (ref 1.5–8.1)
SODIUM BLD-SCNC: 139 MMOL/L (ref 135–144)
TROPONIN INTERP: NORMAL
TROPONIN T: <0.03 NG/ML
TSH SERPL DL<=0.05 MIU/L-ACNC: 0.08 MIU/L (ref 0.3–5)
WBC # BLD: 4.8 K/UL (ref 3.5–11.3)
WBC # BLD: ABNORMAL 10*3/UL

## 2018-03-09 PROCEDURE — 96365 THER/PROPH/DIAG IV INF INIT: CPT

## 2018-03-09 PROCEDURE — 84439 ASSAY OF FREE THYROXINE: CPT

## 2018-03-09 PROCEDURE — G0378 HOSPITAL OBSERVATION PER HR: HCPCS

## 2018-03-09 PROCEDURE — 84484 ASSAY OF TROPONIN QUANT: CPT

## 2018-03-09 PROCEDURE — 80048 BASIC METABOLIC PNL TOTAL CA: CPT

## 2018-03-09 PROCEDURE — 96366 THER/PROPH/DIAG IV INF ADDON: CPT

## 2018-03-09 PROCEDURE — 93005 ELECTROCARDIOGRAM TRACING: CPT

## 2018-03-09 PROCEDURE — 6360000002 HC RX W HCPCS: Performed by: EMERGENCY MEDICINE

## 2018-03-09 PROCEDURE — 85025 COMPLETE CBC W/AUTO DIFF WBC: CPT

## 2018-03-09 PROCEDURE — 99285 EMERGENCY DEPT VISIT HI MDM: CPT

## 2018-03-09 PROCEDURE — 36415 COLL VENOUS BLD VENIPUNCTURE: CPT

## 2018-03-09 PROCEDURE — 84443 ASSAY THYROID STIM HORMONE: CPT

## 2018-03-09 PROCEDURE — 71046 X-RAY EXAM CHEST 2 VIEWS: CPT

## 2018-03-09 PROCEDURE — 2580000003 HC RX 258: Performed by: EMERGENCY MEDICINE

## 2018-03-09 RX ORDER — ACETAMINOPHEN 325 MG/1
650 TABLET ORAL EVERY 4 HOURS PRN
Status: DISCONTINUED | OUTPATIENT
Start: 2018-03-09 | End: 2018-03-10 | Stop reason: HOSPADM

## 2018-03-09 RX ORDER — VERAPAMIL HYDROCHLORIDE 180 MG/1
360 CAPSULE, EXTENDED RELEASE ORAL DAILY
Status: DISCONTINUED | OUTPATIENT
Start: 2018-03-09 | End: 2018-03-10 | Stop reason: HOSPADM

## 2018-03-09 RX ORDER — SODIUM CHLORIDE 0.9 % (FLUSH) 0.9 %
10 SYRINGE (ML) INJECTION PRN
Status: DISCONTINUED | OUTPATIENT
Start: 2018-03-09 | End: 2018-03-10 | Stop reason: HOSPADM

## 2018-03-09 RX ORDER — CYCLOBENZAPRINE HCL 10 MG
TABLET ORAL
Qty: 30 TABLET | Refills: 0 | Status: SHIPPED | OUTPATIENT
Start: 2018-03-09 | End: 2018-04-23 | Stop reason: SDUPTHER

## 2018-03-09 RX ORDER — METHYLPREDNISOLONE 4 MG/1
4 TABLET ORAL SEE ADMIN INSTRUCTIONS
COMMUNITY
End: 2018-03-19 | Stop reason: SDUPTHER

## 2018-03-09 RX ORDER — METOPROLOL TARTRATE 50 MG/1
50 TABLET, FILM COATED ORAL 2 TIMES DAILY
Status: DISCONTINUED | OUTPATIENT
Start: 2018-03-09 | End: 2018-03-10 | Stop reason: HOSPADM

## 2018-03-09 RX ORDER — SODIUM CHLORIDE 0.9 % (FLUSH) 0.9 %
10 SYRINGE (ML) INJECTION EVERY 12 HOURS SCHEDULED
Status: DISCONTINUED | OUTPATIENT
Start: 2018-03-09 | End: 2018-03-10 | Stop reason: HOSPADM

## 2018-03-09 RX ORDER — LEVOTHYROXINE SODIUM 0.1 MG/1
100 TABLET ORAL DAILY
Status: DISCONTINUED | OUTPATIENT
Start: 2018-03-09 | End: 2018-03-10 | Stop reason: HOSPADM

## 2018-03-09 RX ORDER — SIMVASTATIN 20 MG
20 TABLET ORAL EVERY EVENING
Status: DISCONTINUED | OUTPATIENT
Start: 2018-03-09 | End: 2018-03-10 | Stop reason: HOSPADM

## 2018-03-09 RX ORDER — SODIUM CHLORIDE 9 MG/ML
INJECTION, SOLUTION INTRAVENOUS CONTINUOUS
Status: DISCONTINUED | OUTPATIENT
Start: 2018-03-09 | End: 2018-03-10 | Stop reason: HOSPADM

## 2018-03-09 RX ORDER — MAGNESIUM SULFATE 1 G/100ML
1 INJECTION INTRAVENOUS
Status: COMPLETED | OUTPATIENT
Start: 2018-03-09 | End: 2018-03-09

## 2018-03-09 RX ADMIN — MAGNESIUM SULFATE IN DEXTROSE 1 G: 10 INJECTION, SOLUTION INTRAVENOUS at 15:29

## 2018-03-09 RX ADMIN — SODIUM CHLORIDE: 9 INJECTION, SOLUTION INTRAVENOUS at 17:36

## 2018-03-09 RX ADMIN — MAGNESIUM SULFATE IN DEXTROSE 1 G: 10 INJECTION, SOLUTION INTRAVENOUS at 15:59

## 2018-03-09 ASSESSMENT — PAIN SCALES - GENERAL: PAINLEVEL_OUTOF10: 0

## 2018-03-09 ASSESSMENT — ENCOUNTER SYMPTOMS
RHINORRHEA: 0
ABDOMINAL PAIN: 0
SHORTNESS OF BREATH: 0

## 2018-03-09 NOTE — ED NOTES
Bed: 39  Expected date:   Expected time:   Means of arrival:   Comments:   [facility], RN  03/09/18 5364

## 2018-03-09 NOTE — ED PROVIDER NOTES
mouth 2 times daily 12/18/17   Sheba Ivan MD   HYDROcodone-acetaminophen (NORCO) 5-325 MG per tablet Take 1 tablet by mouth 2 times daily as needed for Pain . 12/18/17   Sheba Ivan MD   Bone Growth Stimulator (E) MISC by Does not apply route 3/16/17   Toni Alvares DPM   meloxicam (MOBIC) 15 MG tablet Take 15 mg by mouth daily  3/24/16   Historical Provider, MD       REVIEW OF SYSTEMS    (2-9 systems for level 4, 10 or more for level 5)      Review of Systems   Constitutional: Negative for chills and fever. HENT: Negative for congestion and rhinorrhea. Eyes: Negative for visual disturbance. Respiratory: Negative for shortness of breath. Cardiovascular: Negative for chest pain. Gastrointestinal: Negative for abdominal pain. Genitourinary: Negative for dysuria and frequency. Musculoskeletal: Negative for arthralgias. Skin: Negative for wound. Neurological: Positive for dizziness and syncope. Negative for light-headedness. PHYSICAL EXAM   (up to 7 for level 4, 8 or more for level 5)      INITIAL VITALS:   BP (!) 148/96   Pulse 94   Temp 98.1 °F (36.7 °C) (Oral)   Resp 18   LMP  (LMP Unknown)   SpO2 99%     Physical Exam   Constitutional: She is oriented to person, place, and time. No distress. HENT:   Head: Normocephalic and atraumatic. Eyes: Right eye exhibits no discharge. Left eye exhibits no discharge. Cardiovascular: Normal rate, regular rhythm and normal heart sounds. Exam reveals no friction rub. No murmur heard. Pulmonary/Chest: Effort normal and breath sounds normal. No stridor. No respiratory distress. She has no wheezes. She has no rales. She exhibits no tenderness. Abdominal: Soft. She exhibits no distension. There is no tenderness. There is no guarding. Neurological: She is alert and oriented to person, place, and time. Skin: Skin is warm and dry. She is not diaphoretic. Nursing note and vitals reviewed.     DIFFERENTIAL  DIAGNOSIS PLAN (LABS / IMAGING / EKG):  Orders Placed This Encounter   Procedures    XR CHEST STANDARD (2 VW)    Basic Metabolic Panel    CBC Auto Differential    TSH with Reflex    T4, Free    POCT troponin    POCT troponin    EKG 12 Lead    Insert peripheral IV    PATIENT STATUS (FROM ED OR OR/PROCEDURAL) Observation       MEDICATIONS ORDERED:  Orders Placed This Encounter   Medications    magnesium sulfate 1 g in dextrose 5% 100 mL IVPB     DIAGNOSTIC RESULTS / EMERGENCY DEPARTMENT COURSE / MDM     LABS:  Results for orders placed or performed during the hospital encounter of 43/41/39   Basic Metabolic Panel   Result Value Ref Range    Glucose 168 (H) 70 - 99 mg/dL    BUN 14 6 - 20 mg/dL    CREATININE 0.54 0.50 - 0.90 mg/dL    Bun/Cre Ratio NOT REPORTED 9 - 20    Calcium 9.5 8.6 - 10.4 mg/dL    Sodium 139 135 - 144 mmol/L    Potassium 3.5 (L) 3.7 - 5.3 mmol/L    Chloride 101 98 - 107 mmol/L    CO2 26 20 - 31 mmol/L    Anion Gap 12 9 - 17 mmol/L    GFR Non-African American >60 >60 mL/min    GFR African American >60 >60 mL/min    GFR Comment          GFR Staging NOT REPORTED    CBC Auto Differential   Result Value Ref Range    WBC 4.8 3.5 - 11.3 k/uL    RBC 4.11 3.95 - 5.11 m/uL    Hemoglobin 10.8 (L) 11.9 - 15.1 g/dL    Hematocrit 33.5 (L) 36.3 - 47.1 %    MCV 81.5 (L) 82.6 - 102.9 fL    MCH 26.3 25.2 - 33.5 pg    MCHC 32.2 28.4 - 34.8 g/dL    RDW 13.2 11.8 - 14.4 %    Platelets 559 123 - 576 k/uL    MPV 10.5 8.1 - 13.5 fL    NRBC Automated 0.0 0.0 per 100 WBC    Differential Type NOT REPORTED     Seg Neutrophils 78 (H) 36 - 65 %    Lymphocytes 16 (L) 24 - 43 %    Monocytes 6 3 - 12 %    Eosinophils % 0 (L) 1 - 4 %    Basophils 0 0 - 2 %    Immature Granulocytes 0 0 %    Segs Absolute 3.67 1.50 - 8.10 k/uL    Absolute Lymph # 0.78 (L) 1.10 - 3.70 k/uL    Absolute Mono # 0.30 0.10 - 1.20 k/uL    Absolute Eos # <0.03 0.00 - 0.44 k/uL    Basophils # <0.03 0.00 - 0.20 k/uL    Absolute Immature Granulocyte <0.03 observation unit for cardiology to evaluate. Discussed with the patient is agreeable with plan. PROCEDURES:  None    CONSULTS:  None    FINAL IMPRESSION      1. Near syncope    2.  Prolonged QT interval          DISPOSITION / PLAN     DISPOSITION Admitted 03/09/2018 04:11:17 PM      PATIENT REFERRED TO:  Fortunato Mcconnell MD  [facility], [address] [facility] [address]            DISCHARGE MEDICATIONS:  New Prescriptions    No medications on file       Lilibeth Candelaria MD  Emergency Medicine Resident, PGY-2  [facility]    (Please note that portions of this note were completed with a voice recognition program.  Efforts were made to edit the dictations but occasionally words are mis-transcribed.)       Lilibeth Candelaria MD  Resident  03/09/18 4800

## 2018-03-09 NOTE — ED PROVIDER NOTES
[address] [facility]     Emergency Department     Faculty Attestation    I performed a history and physical examination of the patient and discussed management with the resident. I reviewed the residents note and agree with the documented findings and plan of care. Any areas of disagreement are noted on the chart. I was personally present for the key portions of any procedures. I have documented in the chart those procedures where I was not present during the key portions. I have reviewed the emergency nurses triage note. I agree with the chief complaint, past medical history, past surgical history, allergies, medications, social and family history as documented unless otherwise noted below. For Physician Assistant/ Nurse Practitioner cases/documentation I have personally evaluated this patient and have completed at least one if not all key elements of the E/M (history, physical exam, and MDM). Additional findings are as noted. Chest clear,  Heart exam normal , no pain or swelling on examination of the lower extremities , equal pulses both wrists , trachea midline. Abdomen is nontender without pulsatile mass or bruit. The patient denies any chest pain or shortness of breath, denies headache, denies any discomfort. States she feels back to normal at this time. . Patient appears comfortable in no distress, skin is warm and dry.     Felicity Michelle MD [address] [employer] [facility]  Attending Physician    EKG Interpretation    Interpreted by me    Rhythm: normal sinus   Rate: normal/94  Axis: normal  Ectopy: none  Conduction: QT corrected 482 ms  ST Segments: no acute change  T Waves: no acute change  Q Waves: none    Clinical Impression:Abnormal EKG        Felicity Michelle MD  Attending Emergency  Physician              Kimmy Hernandez MD  03/09/18 8481

## 2018-03-10 ENCOUNTER — APPOINTMENT (OUTPATIENT)
Dept: CT IMAGING | Age: 48
End: 2018-03-10
Payer: COMMERCIAL

## 2018-03-10 VITALS
RESPIRATION RATE: 16 BRPM | OXYGEN SATURATION: 96 % | HEIGHT: 62 IN | TEMPERATURE: 98.3 F | SYSTOLIC BLOOD PRESSURE: 138 MMHG | BODY MASS INDEX: 34.32 KG/M2 | WEIGHT: 186.5 LBS | HEART RATE: 75 BPM | DIASTOLIC BLOOD PRESSURE: 87 MMHG

## 2018-03-10 LAB
EKG ATRIAL RATE: 70 BPM
EKG ATRIAL RATE: 94 BPM
EKG P AXIS: 50 DEGREES
EKG P AXIS: 53 DEGREES
EKG P-R INTERVAL: 166 MS
EKG P-R INTERVAL: 168 MS
EKG Q-T INTERVAL: 386 MS
EKG Q-T INTERVAL: 434 MS
EKG QRS DURATION: 86 MS
EKG QRS DURATION: 88 MS
EKG QTC CALCULATION (BAZETT): 468 MS
EKG QTC CALCULATION (BAZETT): 482 MS
EKG R AXIS: 22 DEGREES
EKG R AXIS: 25 DEGREES
EKG T AXIS: 49 DEGREES
EKG T AXIS: 54 DEGREES
EKG VENTRICULAR RATE: 70 BPM
EKG VENTRICULAR RATE: 94 BPM
THYROXINE, FREE: 1.71 NG/DL (ref 0.93–1.7)

## 2018-03-10 PROCEDURE — G0378 HOSPITAL OBSERVATION PER HR: HCPCS

## 2018-03-10 PROCEDURE — 70450 CT HEAD/BRAIN W/O DYE: CPT

## 2018-03-10 PROCEDURE — 93005 ELECTROCARDIOGRAM TRACING: CPT

## 2018-03-10 NOTE — CONSULTS
for Pain . 12/18/17  Yes Eloy Yeh MD   Bone Growth Stimulator (E) MISC by Does not apply route 3/16/17   Nadine Gaxiola DPM       Current Medications: Scheduled Meds:   levothyroxine  100 mcg Oral Daily    metoprolol tartrate  50 mg Oral BID    simvastatin  20 mg Oral QPM    verapamil  360 mg Oral Daily    sodium chloride flush  10 mL Intravenous 2 times per day     Continuous Infusions:   sodium chloride 125 mL/hr at 03/09/18 1736     PRN Meds:.sodium chloride flush, acetaminophen     Allergies:  Patient has no known allergies. Social History:   reports that she has never smoked. She has never used smokeless tobacco. She reports that she does not drink alcohol or use drugs. Family History: family history includes Breast Cancer in her maternal aunt and paternal grandmother; Diabetes in her brother; High Blood Pressure in her brother, father, maternal grandfather, maternal grandmother, mother, paternal grandfather, paternal grandmother, and son. Review of Systems   CONSTITUTIONAL:  negative for fevers, chills, fatigue and malaise    EYES:  negative for discharge    HEENT:  negative for epistaxis and sore throat    RESPIRATORY:  negative for cough, shortness of breath, wheezing    CARDIOVASCULAR:  negative for chest pain, palpitations, positive dizziness ,no edema    GASTROINTESTINAL:  negative for nausea, vomiting, diarrhea, constipation, abdominal pain    GENITOURINARY:  negative for incontinence    MUSCULOSKELETAL:  negative for neck or back pain    NEUROLOGICAL:  negative for headaches, seizures and double vision   PSYCHIATRIC:  negative               PHYSICAL EXAM:    Blood pressure (!) 147/86, pulse 80, temperature 97.4 °F (36.3 °C), temperature source Oral, resp. rate 16, height 5' 2\" (1.575 m), weight 186 lb 8 oz (84.6 kg), SpO2 94 %, not currently breastfeeding.     CONSTITUTIONAL: AOx4, no apparent distress, appears stated age   HEAD: normocephalic, atraumatic   EYES:

## 2018-03-10 NOTE — DISCHARGE SUMMARY
CDU Discharge Summary        Patient:  Marciano Santiago  YOB: 1970    MRN: [id number]   Acct: [de-identified]    Primary Care Physician: Russell James MD    Admit date:  3/9/2018 3/9/2018  3:00 PM  Discharge date:  3/10/2018 3/10/2018  1:15 PM     Discharge Diagnoses:   Acute dizziness most likely etiology vasovagal syncope, due to unknown etiology - Improved with time, will follow up outpatient with PCP/cardiology    Discharge Medications:       F[facility]ix, [address] [place] [address] Medication Instructions NT    Printed on:03/10/18 9270   Medication Information                      Bone Growth Stimulator (E) MISC  by Does not apply route             cyclobenzaprine (FLEXERIL) 10 MG tablet  TAKE 1 TABLET BY MOUTH EVERY NIGHT AT BEDTIME AS NEEDED FOR MUSCLE SPASMS             HYDROcodone-acetaminophen (NORCO) 5-325 MG per tablet  Take 1 tablet by mouth 2 times daily as needed for Pain .             hydroxychloroquine (PLAQUENIL) 200 MG tablet  TK 1 T PO BID WF OR MILK             levothyroxine (SYNTHROID) 100 MCG tablet  TAKE 1 TABLET BY MOUTH DAILY             methylPREDNISolone (MEDROL DOSEPACK) 4 MG tablet  Take 4 mg by mouth See Admin Instructions Take by mouth.             metoprolol tartrate (LOPRESSOR) 50 MG tablet  Take 1 tablet by mouth 2 times daily             simvastatin (ZOCOR) 20 MG tablet  TAKE 1 TABLET BY MOUTH EVERY EVENING             verapamil (VERELAN) 360 MG extended release capsule  TAKE 1 CAPSULE BY MOUTH EVERY NIGHT                 Diet:  Diet NPO, After Midnight     Activity:  As tolerated    Follow-up:  Call today/tomorrow for a follow up appointment with Russell James MD     Consultants: IP CONSULT TO CARDIOLOGY    Procedures:      Diagnostic Test:         Physical Exam:    General appearance - NAD, AOx 3  Lungs -CTA Bilateraly  Heart - Normal S1/S2  Abdomen - Soft NT/ND  Neurological:  No focal motor or sensory weakness. Extremities - Cap refil <2 sec in all ext.   Skin

## 2018-03-10 NOTE — H&P
[facility]  CDU / OBSERVATION eNCOUnter  Resident Note     Pt Name: Mahin Zapata  MRN: [id number]  [place] 1970  Date of evaluation: 3/10/18  Patient's PCP is : Tre Bennett MD    CHIEF COMPLAINT       Chief Complaint   Patient presents with    Loss of Consciousness         HISTORY OF PRESENT ILLNESS    Mahin Zapata is a 50 y.o. female who presents with acute dizziness most likely etiology Vasovagal syncope versus dehydration. Patient presents with acute dizziness described as room spinning, no alleviating or exacerbating factors, intermittent, no associated symptoms. Patient with history of hypertension, lupus. She denies any history of arrhythmia or sudden cardiac death in her family. Patient admitted for cardiology consultation. Location/Symptom: Dizziness  Timing/Onset: Acute  Provocation: None  Quality: Dizzy  Radiation: None  Severity: Mild  Timing/Duration: Intermittent    Modifying Factors: None    REVIEW OF SYSTEMS       General ROS - No fevers, No malaise   Ophthalmic ROS - No discharge, No changes in vision  ENT ROS -  No sore throat, No rhinorrhea,   Respiratory ROS - no shortness of breath, no cough, no  wheezing  Cardiovascular ROS - No chest pain, no dyspnea on exertion  Gastrointestinal ROS - No abdominal pain, no nausea or vomiting, no change in bowel habits, no black or bloody stools  Genito-Urinary ROS - No dysuria, trouble voiding, or hematuria  Musculoskeletal ROS - No myalgias, No arthalgias  Neurological ROS - No headache, Positive dizziness/lightheadedness, No focal weakness, no loss of sensation  Dermatological ROS - No lesions, No rash     (PQRS) Advance directives on face sheet per hospital policy. No change unless specifically mentioned in chart    PAST MEDICAL HISTORY    has a past medical history of Anxiety; DUB (dysfunctional uterine bleeding); Headache; Hyperlipemia;  Hypothyroidism; Iron deficiency anemia; Obesity; Osteoarthritis; and 3/9/2018 3:27 pm COMPARISON: 11/06/2013 HISTORY: ORDERING SYSTEM PROVIDED HISTORY: LOC TECHNOLOGIST PROVIDED HISTORY: Reason for exam:->LOC FINDINGS: Frontal and lateral views of the chest are submitted for review. The cardiac silhouette is normal in size. Lung parenchyma is clear without focal airspace consolidation, sizeable pleural effusion, or pneumothorax. Trachea is midline. Osseous structures and soft tissues are grossly intact. No acute cardiopulmonary pathology. LABS:  I have reviewed and interpreted all available lab results. Labs Reviewed   BASIC METABOLIC PANEL - Abnormal; Notable for the following:        Result Value    Glucose 168 (*)     Potassium 3.5 (*)     All other components within normal limits   CBC WITH AUTO DIFFERENTIAL - Abnormal; Notable for the following:     Hemoglobin 10.8 (*)     Hematocrit 33.5 (*)     MCV 81.5 (*)     Seg Neutrophils 78 (*)     Lymphocytes 16 (*)     Eosinophils % 0 (*)     Absolute Lymph # 0.78 (*)     All other components within normal limits   TSH WITH REFLEX - Abnormal; Notable for the following:     TSH 0.08 (*)     All other components within normal limits   T4, FREE - Abnormal; Notable for the following:     Thyroxine, Free 1.71 (*)     All other components within normal limits   TROPONIN   POCT TROPONIN   POCT TROPONIN   POCT TROPONIN         CDU IMPRESSION       Karyle Cabal is a 50 y.o. female who presents with    1.  Acute dizziness most likely etiology Vasovagal syncope versus dehydration         CDU PLAN     · Acute dizziness: cardiology recommending outpatient tilt table test, will get CT head to rule out any central cause of dizziness, if negative patient stable for discharge  IP CONSULT TO CARDIOLOGY  · Further workup and evaluation   · Follow up recommendations     · Continue home meds, pain control   · Monitor vitals, labs, imaging         CONSULTS:    IP CONSULT TO CARDIOLOGY    PROCEDURES:  Not indicated       PATIENT REFERRED

## 2018-03-11 ENCOUNTER — CARE COORDINATION (OUTPATIENT)
Dept: CASE MANAGEMENT | Age: 48
End: 2018-03-11

## 2018-03-12 ENCOUNTER — CARE COORDINATION (OUTPATIENT)
Dept: CASE MANAGEMENT | Age: 48
End: 2018-03-12

## 2018-03-13 NOTE — CARE COORDINATION
[facility] Transitions Initial Follow Up Call    Call within 2 business days of discharge: Yes    Patient: Anne Marie Becerra Patient : 1970   MRN: [id number]  Reason for Admission: Near Syncope  Discharge Date: 3/10/18 RARS: Ge[facility]er Risk Score: 7.75     Attempted initial 24 hour transitional call to patient. Left VM to return call directly to [phone], [phone]. 3rd attempt.      Facility: [facility]      Care Transitions 24 Hour Call    Care Transitions Interventions         Follow Up  Future Appointments  Date Time Provider Steph Vizcarra   3/19/2018 4:00 PM DIVYA Kohler RN

## 2018-03-14 ENCOUNTER — CARE COORDINATION (OUTPATIENT)
Dept: CASE MANAGEMENT | Age: 48
End: 2018-03-14

## 2018-03-14 NOTE — CARE COORDINATION
[facility] Transitions Initial Follow Up Call    Call within 2 business days of discharge: No    Patient: Emelyn Aranda Patient : 1970   MRN: [id number]  Reason for Admission:Syncope  Discharge Date: 3/10/18 RARS: Ge[facility]er Risk Score: 7.75     Spoke with: [facility],[address], [place] Wing: [facility]    Non-face-to-face services provided:  Obtained and reviewed discharge summary and/or continuity of care documents     Patient states she is not feeling dizzy however she states she still does not feel \"right\"  She denies headaches, blurry vision, dizziness. She states she can't explain how she feels, just not right in her head. Blood pressure is elevated and she is checking throughout the day before and after medications. One reading was 198/100 and another 154/100. I offered to move her upcoming appointment on Monday to a sooner appointment but she declined. She needs a tilt table study, she wanted to know how late they do these as she works till 4 pm 6 days a week. TC to [facility], left message with [place] to inquire. Patient states she has a call into [facility] and is awaiting their return call to schedule follow up. Denies any needs at this time. Expressed if blood pressure remains this high after medication and symptoms develop such has headaches, dizziness, blurry vision to return to the ED.      Care Transitions 24 Hour Call    Schedule Follow Up Appointment with PCP:  Completed  Do you have any ongoing symptoms?:  Yes  Patient-reported symptoms:  Other (Comment: States she is not dizzy, just does not feel \"right\")  Do you have a copy of your discharge instructions?:  Yes  Do you have all of your prescriptions and are they filled?:  Yes  Have you been contacted by a [address]?:  No  Have you scheduled your follow up appointment?:  Yes  How are you going to get to your appointment?:  Car - family or friend to transport  Were you discharged with any Home Care or Post Acute Services:  No  Do you feel like you have everything you need to keep you well at home?:  Yes  Care Transitions Interventions         Follow Up  Future Appointments  Date Time Provider Steph Vizcarra   3/19/2018 4:00 PM DIVYA Shields, SHARIF[place]

## 2018-03-16 ENCOUNTER — CARE COORDINATION (OUTPATIENT)
Dept: CASE MANAGEMENT | Age: 48
End: 2018-03-16

## 2018-03-19 ENCOUNTER — OFFICE VISIT (OUTPATIENT)
Dept: FAMILY MEDICINE CLINIC | Age: 48
End: 2018-03-19
Payer: COMMERCIAL

## 2018-03-19 VITALS
WEIGHT: 173 LBS | DIASTOLIC BLOOD PRESSURE: 91 MMHG | HEIGHT: 62 IN | BODY MASS INDEX: 31.83 KG/M2 | HEART RATE: 86 BPM | SYSTOLIC BLOOD PRESSURE: 137 MMHG

## 2018-03-19 DIAGNOSIS — K04.7 APICAL ABSCESS: ICD-10-CM

## 2018-03-19 DIAGNOSIS — R55 NEAR SYNCOPE: Primary | ICD-10-CM

## 2018-03-19 DIAGNOSIS — E78.2 MIXED HYPERLIPIDEMIA: ICD-10-CM

## 2018-03-19 DIAGNOSIS — I10 ESSENTIAL HYPERTENSION: ICD-10-CM

## 2018-03-19 DIAGNOSIS — E03.9 HYPOTHYROIDISM, UNSPECIFIED TYPE: ICD-10-CM

## 2018-03-19 DIAGNOSIS — E66.09 CLASS 1 OBESITY DUE TO EXCESS CALORIES WITHOUT SERIOUS COMORBIDITY WITH BODY MASS INDEX (BMI) OF 32.0 TO 32.9 IN ADULT: ICD-10-CM

## 2018-03-19 PROBLEM — M79.7 FIBROMYALGIA: Status: ACTIVE | Noted: 2018-03-19

## 2018-03-19 PROBLEM — G62.9 NEUROPATHY: Status: ACTIVE | Noted: 2018-03-19

## 2018-03-19 PROCEDURE — 99495 TRANSJ CARE MGMT MOD F2F 14D: CPT | Performed by: FAMILY MEDICINE

## 2018-03-19 RX ORDER — HYDROCODONE BITARTRATE AND ACETAMINOPHEN 5; 325 MG/1; MG/1
TABLET ORAL
COMMUNITY
Start: 2017-12-20 | End: 2018-03-19 | Stop reason: SDUPTHER

## 2018-03-19 RX ORDER — HYDROXYCHLOROQUINE SULFATE 200 MG/1
TABLET, FILM COATED ORAL
COMMUNITY
Start: 2017-10-09 | End: 2020-08-07 | Stop reason: ALTCHOICE

## 2018-03-19 RX ORDER — SIMVASTATIN 20 MG
TABLET ORAL
COMMUNITY
End: 2018-05-07

## 2018-03-19 RX ORDER — SULFASALAZINE 500 MG/1
TABLET, DELAYED RELEASE ORAL
Refills: 2 | COMMUNITY
Start: 2018-03-08 | End: 2020-08-07

## 2018-03-19 RX ORDER — METHYLPREDNISOLONE 4 MG/1
TABLET ORAL
COMMUNITY
End: 2018-03-19 | Stop reason: ALTCHOICE

## 2018-03-19 RX ORDER — METOPROLOL TARTRATE 50 MG/1
TABLET, FILM COATED ORAL
COMMUNITY
End: 2018-06-26

## 2018-03-19 RX ORDER — AMOXICILLIN 250 MG/1
250 CAPSULE ORAL 3 TIMES DAILY
Qty: 30 CAPSULE | Refills: 0 | Status: SHIPPED | OUTPATIENT
Start: 2018-03-19 | End: 2018-03-28 | Stop reason: SDUPTHER

## 2018-03-19 RX ORDER — VERAPAMIL HYDROCHLORIDE 360 MG/1
CAPSULE, DELAYED RELEASE PELLETS ORAL
COMMUNITY
End: 2018-05-07

## 2018-03-19 RX ORDER — LEVOTHYROXINE SODIUM 0.1 MG/1
TABLET ORAL
COMMUNITY
End: 2018-04-25 | Stop reason: SDUPTHER

## 2018-03-19 NOTE — PROGRESS NOTES
Transition Care Office Visit    Date of Face-to-Face: 3/19/2018    Persons at visit: mother    Here for follow after hospitalization for: syncope    Activity: activity as tolerated    Any medication changes since post-hospitalization phone call? No    Any treatment changes since post-hospitalization phone call? No    Any further education needed on medications/treatment plan? No      All Active Meds in Chart - may or may not be currently taking post-hospital  Current Outpatient Prescriptions   Medication Sig Dispense Refill    hydroxychloroquine (PLAQUENIL) 200 MG tablet 1 tablet with food or milk      levothyroxine (SYNTHROID) 100 MCG tablet 1 tablet on an empty stomach in the morning      metoprolol tartrate (LOPRESSOR) 50 MG tablet 1 tablet with food      simvastatin (ZOCOR) 20 MG tablet 1 tablet in the evening      verapamil (VERELAN) 360 MG extended release capsule 1 capsule      sulfaSALAzine (AZULFIDINE) 500 MG EC tablet TK 2 TS PO BID  2    cyclobenzaprine (FLEXERIL) 10 MG tablet TAKE 1 TABLET BY MOUTH EVERY NIGHT AT BEDTIME AS NEEDED FOR MUSCLE SPASMS 30 tablet 0    HYDROcodone-acetaminophen (NORCO) 5-325 MG per tablet Take 1 tablet by mouth 2 times daily as needed for Pain . 20 tablet 0     No current facility-administered medications for this visit.         Current Medications (meds in record marked as taking per [facility] phone call)  Outpatient Prescriptions Marked as Taking for the 3/19/18 encounter (Office Visit) with Nicholas Haney MD   Medication Sig Dispense Refill    hydroxychloroquine (PLAQUENIL) 200 MG tablet 1 tablet with food or milk      levothyroxine (SYNTHROID) 100 MCG tablet 1 tablet on an empty stomach in the morning      metoprolol tartrate (LOPRESSOR) 50 MG tablet 1 tablet with food      simvastatin (ZOCOR) 20 MG tablet 1 tablet in the evening      verapamil (VERELAN) 360 MG extended release capsule 1 capsule      sulfaSALAzine (AZULFIDINE) 500 MG EC tablet TK 2 TS PO BID falling onto a pole. Others around her helped her to sit down lasted about 30 seconds. States her vision became blurry. No seizure activity. No head injury. No nausea vomiting. No double vision. No chest pain. No shortness of breath. Also patient states she has pain in her left lower molar which is not related to her above symptoms. She will be seeing her dentist next week. ROS (2-9 systems - moderate, >10 systems complete)  No acute problems. Vitals  BP (!) 137/91 (Site: Left Arm, Position: Sitting, Cuff Size: Large Adult)   Pulse 86   Ht 5' 2.01\" (1.575 m)   Wt 173 lb (78.5 kg)   LMP  (LMP Unknown)   Breastfeeding? No   BMI 31.63 kg/m²       Exam (moderate - affected and related systems, comprehensive 8 or more systems)  ENT exam appears normal.  Eyes show no nystagmus. Throat is clear. No facial nerve deficit noted. There is a carious left lower molar which is tender to palpation. There is swelling around the gums. Neck shows no bruits. Heart is in regular rhythm. No murmurs noted. Lungs are clear. Abdomen is soft. Bowel sounds are heard. No masses palpable. She moves all her limbs. No tremors noted. She is able to lie down and sit up. She is able to stand up straight. Romberg is normal.  Her gait appears stable. Assessment/Plan      1. Near syncope  Patient had an episode of near syncope and was seen in the emergency room. Patient was seen by a cardiologist.  Patient is due to get her tilt table testing on 3/21/18. She also has a follow-up appointment with the cardiologist  Patient return to work on 3/15/18. 2. Essential hypertension  Her blood pressure is borderline elevated. It was more  elevated when she was in the emergency room. 3. Hypothyroidism, unspecified type  Her thyroid function is stable. Her last lab where work was discussed with her. 4. Mixed hyperlipidemia  Her lipids are stable. Her medications were reviewed with her.     5. Apical abscess  Patient has an apical abscess of her left lower molar. Prescription for amoxicillin was given. She will rinse her mouth frequently. She'll keep her appointment with her dentist.    6. Class 1 obesity due to excess calories without serious comorbidity with body mass index (BMI) of 32.0 to 32.9 in adult  Patient's weight is stable. Health maintenance and activity were discussed. Her diet was discussed. Diagnostic Tests performed in hospital: Yes. Requested/reviewed: Yes    Disease Education:  HTN      Home Health Care :  None      Discussed with other providers: Her chart was evaluated. Note:  CPT Coding - 75592 (Moderate level - seen within 14 days)      35728 (High level - seen within 7 days)  Needs to have associated phone contact within 2 business days of inpatient discharge      (example to assist with coding - may be deleted off of note)  Moderate complexity   - one or more chronic illness with mild exacerbation or side effect of treatment   - 2 or more stable chronic conditions   - undiagnosed new problem   - acute illness with systemic symptoms   - acute complicated injury   - physiologic test under stress   - diagnostic endoscopy   - needle or incision biopsy   - cardiac imaging with contrast no risk factors   - obtain body fluids    High complexity   - one or more chronic illness with severe exacerbation, progression, or side effect   - acute or chronic illness with threat to life or body function - mi, pe, resp distress   - abrupt change in neurologic status - tia, cvva, weakness   - cardiac imaging with contrast and risk factors   - diagnostic endoscopy with risk factors   - elective major surgyery with risk factors   - emergent surgery   - drug therapy requiring intensive monitoring.

## 2018-03-19 NOTE — PROGRESS NOTES
Chronic Disease Visit Information    BP Readings from Last 3 Encounters:   03/10/18 138/87   12/18/17 128/89   09/25/17 128/68          Hemoglobin A1C (%)   Date Value   07/14/2015 5.6     LDL Cholesterol (mg/dL)   Date Value   03/09/2017 112     HDL (mg/dL)   Date Value   03/09/2017 77     BUN (mg/dL)   Date Value   03/09/2018 14     CREATININE (mg/dL)   Date Value   03/09/2018 0.54     Glucose (mg/dL)   Date Value   03/09/2018 168 (H)   05/05/2012 88            Have you changed or started any medications since your last visit including any over-the-counter medicines, vitamins, or herbal medicines? no   Are you having any side effects from any of your medications? -  no  Have you stopped taking any of your medications? Is so, why? -  no    Have you seen any other physician or provider since your last visit? No  Have you had any other diagnostic tests since your last visit? Yes - Records Obtained  Have you been seen in the emergency room and/or had an admission to a hospital since we last saw you? Yes - Records Obtained  Have you had your annual diabetic retinal (eye) exam? No  Have you had your routine dental cleaning in the past 6 months? no    Have you activated your [employer] account? If not, what are your barriers?  Yes     Patient Care Team:  Nyasia El MD as PCP - General (Family Medicine)  Nyasia El MD as PCP - S Attributed Provider  Brain Acuna RN as Care Transition  Sylvain Heredia RN as Care Transition  Daisy Aburto MA as Care Transition         Medical History Review  Past Medical, Family, and Social History reviewed and does contribute to the patient presenting condition    Health Maintenance   Topic Date Due    Cervical cancer screen  06/18/2016    Flu vaccine (1) 09/03/2018 (Originally 9/1/2017)    DTaP/Tdap/Td vaccine (1 - Tdap) 01/19/2019 (Originally 7/14/1987)    HIV screen  01/19/2019 (Originally 7/14/1983)    Breast cancer screen  06/29/2018    Diabetes

## 2018-03-21 ENCOUNTER — HOSPITAL ENCOUNTER (OUTPATIENT)
Dept: CARDIAC CATH/INVASIVE PROCEDURES | Age: 48
Discharge: HOME | End: 2018-03-21
Payer: COMMERCIAL

## 2018-03-21 PROCEDURE — 93660 TILT TABLE EVALUATION: CPT

## 2018-03-21 PROCEDURE — 6370000000 HC RX 637 (ALT 250 FOR IP)

## 2018-03-23 ENCOUNTER — CARE COORDINATION (OUTPATIENT)
Dept: CASE MANAGEMENT | Age: 48
End: 2018-03-23

## 2018-03-26 ENCOUNTER — CARE COORDINATION (OUTPATIENT)
Dept: CASE MANAGEMENT | Age: 48
End: 2018-03-26

## 2018-03-28 DIAGNOSIS — K04.7 APICAL ABSCESS: ICD-10-CM

## 2018-03-28 RX ORDER — AMOXICILLIN 250 MG/1
250 CAPSULE ORAL 3 TIMES DAILY
Qty: 15 CAPSULE | Refills: 0 | Status: SHIPPED | OUTPATIENT
Start: 2018-03-28 | End: 2018-04-02

## 2018-03-29 DIAGNOSIS — I10 ESSENTIAL HYPERTENSION: ICD-10-CM

## 2018-03-29 DIAGNOSIS — E03.9 HYPOTHYROIDISM, UNSPECIFIED TYPE: ICD-10-CM

## 2018-03-29 DIAGNOSIS — E78.2 MIXED HYPERLIPIDEMIA: ICD-10-CM

## 2018-03-29 RX ORDER — SIMVASTATIN 20 MG
20 TABLET ORAL EVERY EVENING
Qty: 30 TABLET | Refills: 0 | Status: SHIPPED | OUTPATIENT
Start: 2018-03-29 | End: 2018-05-04 | Stop reason: SDUPTHER

## 2018-03-29 RX ORDER — VERAPAMIL HYDROCHLORIDE 360 MG/1
CAPSULE, DELAYED RELEASE PELLETS ORAL
Qty: 30 CAPSULE | Refills: 0 | Status: SHIPPED | OUTPATIENT
Start: 2018-03-29 | End: 2018-05-04 | Stop reason: SDUPTHER

## 2018-03-29 RX ORDER — LEVOTHYROXINE SODIUM 0.1 MG/1
100 TABLET ORAL DAILY
Qty: 30 TABLET | Refills: 0 | Status: SHIPPED | OUTPATIENT
Start: 2018-03-29 | End: 2018-04-24 | Stop reason: SDUPTHER

## 2018-03-29 NOTE — TELEPHONE ENCOUNTER
Groin strain     Unstable gait     Sprain, lumbosacral     Constipation by delayed colonic transit     Non morbid obesity due to excess calories     Essential hypertension     Breast cancer screening     Mixed hyperlipidemia     Arthralgia of multiple joints     Hx of fracture of ankle     Hypothyroidism     Systemic lupus erythematosus (HCC)     DJD (degenerative joint disease), lumbosacral     Near syncope     Fibromyalgia     Neuropathy ([facility] [address]

## 2018-04-18 DIAGNOSIS — I10 ESSENTIAL HYPERTENSION: ICD-10-CM

## 2018-04-19 RX ORDER — METOPROLOL TARTRATE 50 MG/1
TABLET, FILM COATED ORAL
Qty: 60 TABLET | Refills: 3 | Status: SHIPPED | OUTPATIENT
Start: 2018-04-19 | End: 2018-09-13 | Stop reason: SDUPTHER

## 2018-04-24 DIAGNOSIS — E03.9 HYPOTHYROIDISM, UNSPECIFIED TYPE: ICD-10-CM

## 2018-04-25 RX ORDER — LEVOTHYROXINE SODIUM 0.1 MG/1
100 TABLET ORAL DAILY
Qty: 30 TABLET | Refills: 2 | Status: SHIPPED | OUTPATIENT
Start: 2018-04-25 | End: 2018-08-02 | Stop reason: SDUPTHER

## 2018-05-04 DIAGNOSIS — I10 ESSENTIAL HYPERTENSION: ICD-10-CM

## 2018-05-04 DIAGNOSIS — E78.2 MIXED HYPERLIPIDEMIA: ICD-10-CM

## 2018-05-07 RX ORDER — SIMVASTATIN 20 MG
20 TABLET ORAL EVERY EVENING
Qty: 30 TABLET | Refills: 0 | Status: SHIPPED | OUTPATIENT
Start: 2018-05-07 | End: 2018-06-05 | Stop reason: SDUPTHER

## 2018-05-07 RX ORDER — VERAPAMIL HYDROCHLORIDE 360 MG/1
CAPSULE, DELAYED RELEASE PELLETS ORAL
Qty: 30 CAPSULE | Refills: 0 | Status: SHIPPED | OUTPATIENT
Start: 2018-05-07 | End: 2018-06-05 | Stop reason: SDUPTHER

## 2018-06-05 DIAGNOSIS — E78.2 MIXED HYPERLIPIDEMIA: ICD-10-CM

## 2018-06-05 DIAGNOSIS — I10 ESSENTIAL HYPERTENSION: ICD-10-CM

## 2018-06-06 RX ORDER — SIMVASTATIN 20 MG
20 TABLET ORAL EVERY EVENING
Qty: 30 TABLET | Refills: 0 | Status: SHIPPED | OUTPATIENT
Start: 2018-06-06 | End: 2018-07-02 | Stop reason: SDUPTHER

## 2018-06-06 RX ORDER — VERAPAMIL HYDROCHLORIDE 360 MG/1
CAPSULE, DELAYED RELEASE PELLETS ORAL
Qty: 30 CAPSULE | Refills: 0 | Status: SHIPPED | OUTPATIENT
Start: 2018-06-06 | End: 2018-07-02 | Stop reason: SDUPTHER

## 2018-06-26 ENCOUNTER — OFFICE VISIT (OUTPATIENT)
Dept: FAMILY MEDICINE CLINIC | Age: 48
End: 2018-06-26
Payer: COMMERCIAL

## 2018-06-26 VITALS
HEART RATE: 64 BPM | SYSTOLIC BLOOD PRESSURE: 115 MMHG | DIASTOLIC BLOOD PRESSURE: 80 MMHG | WEIGHT: 178.8 LBS | BODY MASS INDEX: 32.69 KG/M2

## 2018-06-26 DIAGNOSIS — M25.50 ARTHRALGIA OF MULTIPLE JOINTS: ICD-10-CM

## 2018-06-26 DIAGNOSIS — Z12.39 SPECIAL SCREENING EXAMINATION FOR NEOPLASM OF BREAST: ICD-10-CM

## 2018-06-26 DIAGNOSIS — S76.211D GROIN STRAIN, RIGHT, SUBSEQUENT ENCOUNTER: ICD-10-CM

## 2018-06-26 DIAGNOSIS — M32.19 OTHER SYSTEMIC LUPUS ERYTHEMATOSUS WITH OTHER ORGAN INVOLVEMENT (HCC): ICD-10-CM

## 2018-06-26 DIAGNOSIS — I10 UNCONTROLLED HYPERTENSION: Primary | ICD-10-CM

## 2018-06-26 DIAGNOSIS — E66.09 CLASS 1 OBESITY DUE TO EXCESS CALORIES WITHOUT SERIOUS COMORBIDITY WITH BODY MASS INDEX (BMI) OF 32.0 TO 32.9 IN ADULT: ICD-10-CM

## 2018-06-26 DIAGNOSIS — R73.9 HYPERGLYCEMIA: ICD-10-CM

## 2018-06-26 DIAGNOSIS — E03.9 HYPOTHYROIDISM, UNSPECIFIED TYPE: ICD-10-CM

## 2018-06-26 LAB — HBA1C MFR BLD: 6.1 %

## 2018-06-26 PROCEDURE — 83036 HEMOGLOBIN GLYCOSYLATED A1C: CPT | Performed by: FAMILY MEDICINE

## 2018-06-26 PROCEDURE — 99213 OFFICE O/P EST LOW 20 MIN: CPT | Performed by: FAMILY MEDICINE

## 2018-06-26 ASSESSMENT — ENCOUNTER SYMPTOMS
RHINORRHEA: 0
SORE THROAT: 0
CONSTIPATION: 0
BACK PAIN: 0
CHEST TIGHTNESS: 0
DIARRHEA: 0
TROUBLE SWALLOWING: 0
ABDOMINAL PAIN: 0
SHORTNESS OF BREATH: 0
NAUSEA: 0
COUGH: 0

## 2018-06-26 ASSESSMENT — PATIENT HEALTH QUESTIONNAIRE - PHQ9
2. FEELING DOWN, DEPRESSED OR HOPELESS: 0
SUM OF ALL RESPONSES TO PHQ9 QUESTIONS 1 & 2: 0
SUM OF ALL RESPONSES TO PHQ QUESTIONS 1-9: 0
1. LITTLE INTEREST OR PLEASURE IN DOING THINGS: 0

## 2018-07-02 DIAGNOSIS — I10 ESSENTIAL HYPERTENSION: ICD-10-CM

## 2018-07-02 DIAGNOSIS — E78.2 MIXED HYPERLIPIDEMIA: ICD-10-CM

## 2018-07-03 NOTE — TELEPHONE ENCOUNTER
Requested Prescriptions     Pending Prescriptions Disp Refills    simvastatin (ZOCOR) 20 MG tablet [Pharmacy Med Name: SIMVASTATIN 20MG TABLETS] 30 tablet 3     Sig: TAKE 1 TABLET BY MOUTH EVERY EVENING    verapamil (VERELAN) 360 MG extended release capsule [Pharmacy Med Name: VERAPAMIL 360MG SR CAPSULES] 30 capsule 3     Sig: TAKE 1 CAPSULE BY MOUTH EVERY NIGHT     Next Visit Date:  Future Appointments  Date Time Provider Steph Vizcarra   7/17/2018 7:30 AM STA SCR MAMMO RM 2 STAZ MAMMO STA Radiolog   10/2/2018 8:20 AM David Regalado MD [id number] Maintenance   Topic Date Due    Cervical cancer screen  06/18/2016    Breast cancer screen  06/29/2018    Flu vaccine (1) 09/03/2018 (Originally 9/1/2018)    DTaP/Tdap/Td vaccine (1 - Tdap) 01/19/2019 (Originally 7/14/1987)    HIV screen  01/19/2019 (Originally 7/14/1983)    TSH testing  03/09/2019    Potassium monitoring  03/09/2019    Creatinine monitoring  03/09/2019    A1C test (Diabetic or Prediabetic)  06/26/2019    Lipid screen  03/09/2022       Hemoglobin A1C (%)   Date Value   06/26/2018 6.1   07/14/2015 5.6             ( goal A1C is < 7)   No results found for: LABMICR  LDL Cholesterol (mg/dL)   Date Value   03/09/2017 112       (goal LDL is <100)   AST (U/L)   Date Value   03/09/2017 28     ALT (U/L)   Date Value   03/09/2017 33     BUN (mg/dL)   Date Value   03/09/2018 14     BP Readings from Last 3 Encounters:   06/26/18 115/80   03/19/18 (!) 137/91   03/10/18 138/87          (goal 120/80)    All Future Testing planned in [facility]  Lab Frequency Next Occurrence   GWENDOLYN CAD SCREENING Once 06/26/2018               Patient Active Problem List:     Anemia     Fatigue     Uterine fibroid     Obesity     Dyspepsia     DUB (dysfunctional uterine bleeding)     Dysmenorrhea     Syncope     Anxiety     Stress     Uncontrolled hypertension     Problem with medical care compliance     Lumbar back sprain     Headache     Groin strain     Unstable gait     Sprain, lumbosacral     Constipation by delayed colonic transit     Non morbid obesity due to excess calories     Essential hypertension     Breast cancer screening     Mixed hyperlipidemia     Arthralgia of multiple joints     Hx of fracture of ankle     Hypothyroidism     Systemic lupus erythematosus (HCC)     DJD (degenerative joint disease), lumbosacral     Near syncope     Fibromyalgia     Neuropathy ([facility] [address]

## 2018-07-04 RX ORDER — SIMVASTATIN 20 MG
20 TABLET ORAL EVERY EVENING
Qty: 30 TABLET | Refills: 3 | Status: SHIPPED | OUTPATIENT
Start: 2018-07-04 | End: 2018-12-12 | Stop reason: SDUPTHER

## 2018-07-04 RX ORDER — VERAPAMIL HYDROCHLORIDE 360 MG/1
CAPSULE, DELAYED RELEASE PELLETS ORAL
Qty: 30 CAPSULE | Refills: 3 | Status: SHIPPED | OUTPATIENT
Start: 2018-07-04 | End: 2018-12-12 | Stop reason: SDUPTHER

## 2018-07-17 ENCOUNTER — HOSPITAL ENCOUNTER (OUTPATIENT)
Dept: MAMMOGRAPHY | Age: 48
Discharge: HOME | End: 2018-07-19
Payer: COMMERCIAL

## 2018-07-17 DIAGNOSIS — Z12.39 SPECIAL SCREENING EXAMINATION FOR NEOPLASM OF BREAST: ICD-10-CM

## 2018-07-17 PROCEDURE — 77063 BREAST TOMOSYNTHESIS BI: CPT

## 2018-08-02 DIAGNOSIS — E78.2 MIXED HYPERLIPIDEMIA: Primary | ICD-10-CM

## 2018-08-02 DIAGNOSIS — E03.9 HYPOTHYROIDISM, UNSPECIFIED TYPE: ICD-10-CM

## 2018-08-03 RX ORDER — LEVOTHYROXINE SODIUM 0.1 MG/1
100 TABLET ORAL DAILY
Qty: 30 TABLET | Refills: 2 | Status: SHIPPED | OUTPATIENT
Start: 2018-08-03 | End: 2018-12-06 | Stop reason: SDUPTHER

## 2018-08-29 ENCOUNTER — OFFICE VISIT (OUTPATIENT)
Dept: FAMILY MEDICINE CLINIC | Age: 48
End: 2018-08-29
Payer: COMMERCIAL

## 2018-08-29 VITALS
BODY MASS INDEX: 34.56 KG/M2 | SYSTOLIC BLOOD PRESSURE: 144 MMHG | WEIGHT: 189 LBS | DIASTOLIC BLOOD PRESSURE: 94 MMHG | OXYGEN SATURATION: 97 % | HEART RATE: 70 BPM

## 2018-08-29 DIAGNOSIS — Z12.11 SCREENING FOR COLON CANCER: ICD-10-CM

## 2018-08-29 DIAGNOSIS — I10 UNCONTROLLED HYPERTENSION: ICD-10-CM

## 2018-08-29 DIAGNOSIS — E03.9 HYPOTHYROIDISM, UNSPECIFIED TYPE: Primary | ICD-10-CM

## 2018-08-29 DIAGNOSIS — R42 EPISODE OF DIZZINESS: ICD-10-CM

## 2018-08-29 DIAGNOSIS — Z13.220 SCREENING, LIPID: ICD-10-CM

## 2018-08-29 PROCEDURE — 99214 OFFICE O/P EST MOD 30 MIN: CPT | Performed by: NURSE PRACTITIONER

## 2018-08-29 ASSESSMENT — ENCOUNTER SYMPTOMS
VOMITING: 0
NAUSEA: 0

## 2018-08-29 NOTE — PROGRESS NOTES
Visit Information    Have you changed or started any medications since your last visit including any over-the-counter medicines, vitamins, or herbal medicines? no   Are you having any side effects from any of your medications? -  no  Have you stopped taking any of your medications? Is so, why? -  no    Have you seen any other physician or provider since your last visit? No  Have you had any other diagnostic tests since your last visit? No  Have you been seen in the emergency room and/or had an admission to a hospital since we last saw you? No  Have you had your routine dental cleaning in the past 6 months? no    Have you activated your [employer] account? If not, what are your barriers?  Yes     Patient Care Team:  ORVILLE Young CNP as PCP - General (Certified Nurse Practitioner)  Viktoriya Ni MD as PCP - S Attributed Provider    Medical History Review  Past Medical, Family, and Social History reviewed and does contribute to the patient presenting condition    Health Maintenance   Topic Date Due    Cervical cancer screen  06/18/2016    Shingles Vaccine (1 of 2 - 2 Dose Series) 07/14/2018    Colon cancer screen colonoscopy  07/14/2018    Flu vaccine (1) 09/03/2018 (Originally 9/1/2018)    DTaP/Tdap/Td vaccine (1 - Tdap) 01/19/2019 (Originally 7/14/1987)    HIV screen  01/19/2019 (Originally 7/14/1983)    TSH testing  03/09/2019    Potassium monitoring  03/09/2019    Creatinine monitoring  03/09/2019    A1C test (Diabetic or Prediabetic)  06/26/2019    Breast cancer screen  07/17/2019    Lipid screen  03/09/2022

## 2018-08-29 NOTE — PROGRESS NOTES
uterine bleeding)     Fibromyalgia 3/19/2018    Headache 2/11/2014    Hyperlipemia     Hypothyroidism     Iron deficiency anemia     Lupus     Neuropathy 3/19/2018    Obesity     Osteoarthritis     Tilt table evaluation 03/21/2018    Uncontrolled hypertension 8/18/2013      Past Surgical History:   Procedure Laterality Date    ENDOMETRIAL ABLATION  9/17/2010    D & C, Hysterscopy with NovaSure device    HYSTERECTOMY  2/11/2011    Da Cornelia Hysterectomy     Family History   Problem Relation Age of Onset    High Blood Pressure Mother     High Blood Pressure Father     Diabetes Brother     High Blood Pressure Brother     Breast Cancer Maternal Aunt     High Blood Pressure Maternal Grandmother     High Blood Pressure Maternal Grandfather     High Blood Pressure Paternal Grandmother     Breast Cancer Paternal Grandmother     High Blood Pressure Paternal Grandfather     High Blood Pressure Son      Social History   Substance Use Topics    Smoking status: Never Smoker    Smokeless tobacco: Never Used    Alcohol use No      No Known Allergies    Subjective:      Review of Systems   Constitutional: Negative for fatigue and fever. Gastrointestinal: Negative for nausea and vomiting. Neurological: Positive for dizziness. Objective:     BP (!) 144/94 (Position: Standing)   Pulse 70   Wt 189 lb (85.7 kg)   LMP  (LMP Unknown)   SpO2 97%   Breastfeeding? No   BMI 34.56 kg/m²    Wt Readings from Last 3 Encounters:   08/29/18 189 lb (85.7 kg)   06/26/18 178 lb 12.8 oz (81.1 kg)   03/19/18 173 lb (78.5 kg)   bp elevated, pt reports she had not taken meds yet today. She stopped going to gym, has gained 11 lbs in 2 months. Physical Exam   Constitutional: She is oriented to person, place, and time. She appears well-developed and well-nourished.    HENT:   Right Ear: Hearing, tympanic membrane, external ear and ear canal normal.   Left Ear: Hearing, tympanic membrane, external ear and ear canal normal.   Neck: Normal range of motion. Thyromegaly present. Cardiovascular: Normal rate, regular rhythm and normal heart sounds. Pulmonary/Chest: Effort normal and breath sounds normal.   Neurological: She is alert and oriented to person, place, and time. II: Normal tracking, no evidence of hemianopia or other visual field defect. III, IV, & VI: EOM intact, no ptosis, no nystagmus seen. Pupils are equal and reactive to light. V: Facial sensation is intact to light touch/temperature. VII: no facial asymmetry, facial movement intact. VIII: hearing is normal to finger rub. IX-X: Palate elevates in the midline. XI: Normal trapezius strength and/or movement. XII: Tongue movement is normal, position is midline and no fasciculations are observed. Psychiatric: She has a normal mood and affect. Assessment/Plan         1. Hypothyroidism, unspecified type    - TSH With Reflex Ft4; Future  - TSH; Future    2. Screening for colon cancer    - POCT FECAL IMMUNOCHEMICAL TEST (FIT); Future    3. Screening, lipid    - Lipid Panel; Future    4. Uncontrolled hypertension  Diet and exercise discussed needs to make improvements to bring weight back down. - CBC; Future  - Comprehensive Metabolic Panel; Future    5. Episode of dizziness  Will watch, if symptoms persists, will notify. RTO if symptoms worsen or fail to improve  Pt agreeable with plan      Patient given educational materials - see patient instructions. Discussed use, benefit, and side effects of prescribed medications. All patient questions answered. Pt voiced understanding. Reviewed health maintenance. Instructed to continue current medications, diet and exercise. 1.  Sarah received counseling on the following healthy behaviors: nutrition, exercise and medication adherence  2. Patient given educational materials when available - see patient instructions when applicable  3.   Discussed use, benefit, and side effects of prescribed medications. Barriers to medication compliance addressed. All patient questions answered. Pt voiced understanding. 4.  Reviewed prior labs and health maintenance  5. Continue current medications, diet and exercise.     Completed Refills   Requested Prescriptions      No prescriptions requested or ordered in this encounter         Electronically signed by Cecily Foy CNP on 9/21/2018 at 11:16 AM

## 2018-09-13 DIAGNOSIS — I10 ESSENTIAL HYPERTENSION: ICD-10-CM

## 2018-09-13 LAB
ALBUMIN SERPL-MCNC: 4.7 G/DL
ALP BLD-CCNC: 88 U/L
ALT SERPL-CCNC: 50 U/L
ANION GAP SERPL CALCULATED.3IONS-SCNC: NORMAL MMOL/L
AST SERPL-CCNC: 31 U/L
BASOPHILS ABSOLUTE: NORMAL /ΜL
BASOPHILS RELATIVE PERCENT: NORMAL %
BILIRUB SERPL-MCNC: 0.4 MG/DL (ref 0.1–1.4)
BUN BLDV-MCNC: 12 MG/DL
CALCIUM SERPL-MCNC: 9.8 MG/DL
CHLORIDE BLD-SCNC: 102 MMOL/L
CHOLESTEROL, TOTAL: 211 MG/DL
CHOLESTEROL/HDL RATIO: 2.2
CO2: 28 MMOL/L
CREAT SERPL-MCNC: 0.65 MG/DL
EOSINOPHILS ABSOLUTE: NORMAL /ΜL
EOSINOPHILS RELATIVE PERCENT: NORMAL %
GFR CALCULATED: NORMAL
GLUCOSE BLD-MCNC: 102 MG/DL
HCT VFR BLD CALC: 37.2 % (ref 36–46)
HDLC SERPL-MCNC: 95 MG/DL (ref 35–70)
HEMOGLOBIN: 12.4 G/DL (ref 12–16)
LDL CHOLESTEROL CALCULATED: 86 MG/DL (ref 0–160)
LYMPHOCYTES ABSOLUTE: NORMAL /ΜL
LYMPHOCYTES RELATIVE PERCENT: NORMAL %
MCH RBC QN AUTO: 28 PG
MCHC RBC AUTO-ENTMCNC: 33.4 G/DL
MCV RBC AUTO: 83.8 FL
MONOCYTES ABSOLUTE: NORMAL /ΜL
MONOCYTES RELATIVE PERCENT: NORMAL %
NEUTROPHILS ABSOLUTE: NORMAL /ΜL
NEUTROPHILS RELATIVE PERCENT: NORMAL %
PLATELET # BLD: 337 K/ΜL
PMV BLD AUTO: NORMAL FL
POTASSIUM SERPL-SCNC: 4.2 MMOL/L
RBC # BLD: NORMAL 10^6/ΜL
SODIUM BLD-SCNC: 142 MMOL/L
TOTAL PROTEIN: 8.2
TRIGL SERPL-MCNC: 149 MG/DL
TSH SERPL DL<=0.05 MIU/L-ACNC: 2.97 UIU/ML
VLDLC SERPL CALC-MCNC: 30 MG/DL
WBC # BLD: 5.56 10^3/ML

## 2018-09-13 NOTE — TELEPHONE ENCOUNTER
Last visit:08/29/2018  Last Med refill:08/02/2018    Next Visit Date:  No future appointments.     Health Maintenance   Topic Date Due    Cervical cancer screen  06/18/2016    Shingles Vaccine (1 of 2 - 2 Dose Series) 07/14/2018    Colon cancer screen colonoscopy  07/14/2018    Flu vaccine (1) 09/01/2018    DTaP/Tdap/Td vaccine (1 - Tdap) 01/19/2019 (Originally 7/14/1987)    HIV screen  01/19/2019 (Originally 7/14/1983)    TSH testing  03/09/2019    Potassium monitoring  03/09/2019    Creatinine monitoring  03/09/2019    A1C test (Diabetic or Prediabetic)  06/26/2019    Breast cancer screen  07/17/2019    Lipid screen  03/09/2022       Hemoglobin A1C (%)   Date Value   06/26/2018 6.1   07/14/2015 5.6             ( goal A1C is < 7)   No results found for: LABMICR  LDL Cholesterol (mg/dL)   Date Value   03/09/2017 112   12/17/2015 143 (H)       (goal LDL is <100)   AST (U/L)   Date Value   03/09/2017 28     ALT (U/L)   Date Value   03/09/2017 33     BUN (mg/dL)   Date Value   03/09/2018 14     BP Readings from Last 3 Encounters:   08/29/18 (!) 144/94   06/26/18 115/80   03/19/18 (!) 137/91          (goal 120/80)    All Future Testing planned in [facility]  Lab Frequency Next Occurrence   POCT FECAL IMMUNOCHEMICAL TEST (FIT) Once 09/29/2018   TSH With Reflex Ft4 Once 11/01/2018   Lipid Panel Once 11/01/2018   CBC Once 11/01/2018   TSH Once 11/01/2018   Comprehensive Metabolic Panel Once 62/39/5822               Patient Active Problem List:     Anemia     Fatigue     Uterine fibroid     Obesity     Dyspepsia     DUB (dysfunctional uterine bleeding)     Dysmenorrhea     Syncope     Anxiety     Stress     Uncontrolled hypertension     Problem with medical care compliance     Lumbar back sprain     Headache     Groin strain     Unstable gait     Sprain, lumbosacral     Constipation by delayed colonic transit     Non morbid obesity due to excess calories     Essential hypertension     Breast cancer screening Mixed hyperlipidemia     Arthralgia of multiple joints     Hx of fracture of ankle     Hypothyroidism     Systemic lupus erythematosus (HCC)     DJD (degenerative joint disease), lumbosacral     Near syncope     Fibromyalgia     Neuropathy

## 2018-09-17 DIAGNOSIS — I10 UNCONTROLLED HYPERTENSION: ICD-10-CM

## 2018-09-17 DIAGNOSIS — Z13.220 SCREENING, LIPID: ICD-10-CM

## 2018-09-17 DIAGNOSIS — E03.9 HYPOTHYROIDISM, UNSPECIFIED TYPE: ICD-10-CM

## 2018-09-19 LAB
CONTROL: POSITIVE
HEMOCCULT STL QL: NEGATIVE

## 2018-09-19 RX ORDER — METOPROLOL TARTRATE 50 MG/1
TABLET, FILM COATED ORAL
Qty: 180 TABLET | Refills: 1 | Status: SHIPPED | OUTPATIENT
Start: 2018-09-19 | End: 2018-12-12 | Stop reason: SDUPTHER

## 2018-09-20 DIAGNOSIS — Z12.11 SCREENING FOR COLON CANCER: ICD-10-CM

## 2018-09-20 PROCEDURE — 82274 ASSAY TEST FOR BLOOD FECAL: CPT | Performed by: NURSE PRACTITIONER

## 2018-09-26 PROBLEM — Z12.39 BREAST CANCER SCREENING: Status: RESOLVED | Noted: 2017-06-07 | Resolved: 2018-09-26

## 2018-11-08 ENCOUNTER — TELEPHONE (OUTPATIENT)
Dept: FAMILY MEDICINE CLINIC | Age: 48
End: 2018-11-08

## 2018-12-06 ENCOUNTER — TELEPHONE (OUTPATIENT)
Dept: PRIMARY CARE CLINIC | Age: 48
End: 2018-12-06

## 2018-12-06 DIAGNOSIS — E03.9 HYPOTHYROIDISM, UNSPECIFIED TYPE: ICD-10-CM

## 2018-12-07 RX ORDER — LEVOTHYROXINE SODIUM 0.1 MG/1
100 TABLET ORAL DAILY
Qty: 30 TABLET | Refills: 5 | Status: SHIPPED | OUTPATIENT
Start: 2018-12-07 | End: 2019-06-05 | Stop reason: SDUPTHER

## 2018-12-12 ENCOUNTER — OFFICE VISIT (OUTPATIENT)
Dept: PRIMARY CARE CLINIC | Age: 48
End: 2018-12-12
Payer: COMMERCIAL

## 2018-12-12 VITALS
DIASTOLIC BLOOD PRESSURE: 94 MMHG | WEIGHT: 191 LBS | HEART RATE: 65 BPM | SYSTOLIC BLOOD PRESSURE: 164 MMHG | OXYGEN SATURATION: 98 % | BODY MASS INDEX: 34.93 KG/M2

## 2018-12-12 DIAGNOSIS — E78.2 MIXED HYPERLIPIDEMIA: ICD-10-CM

## 2018-12-12 DIAGNOSIS — I10 ESSENTIAL HYPERTENSION: ICD-10-CM

## 2018-12-12 DIAGNOSIS — M62.830 BACK SPASM: Primary | ICD-10-CM

## 2018-12-12 PROBLEM — M16.11 OSTEOARTHRITIS OF RIGHT HIP: Status: ACTIVE | Noted: 2018-12-12

## 2018-12-12 PROCEDURE — 99214 OFFICE O/P EST MOD 30 MIN: CPT | Performed by: NURSE PRACTITIONER

## 2018-12-12 RX ORDER — VERAPAMIL HYDROCHLORIDE 360 MG/1
CAPSULE, DELAYED RELEASE PELLETS ORAL
Qty: 30 CAPSULE | Refills: 5 | Status: SHIPPED | OUTPATIENT
Start: 2018-12-12 | End: 2019-06-05 | Stop reason: SDUPTHER

## 2018-12-12 RX ORDER — METOPROLOL TARTRATE 75 MG/1
TABLET, FILM COATED ORAL
Qty: 180 TABLET | Refills: 1 | Status: SHIPPED | OUTPATIENT
Start: 2018-12-12 | End: 2019-06-05

## 2018-12-12 RX ORDER — SIMVASTATIN 20 MG
20 TABLET ORAL EVERY EVENING
Qty: 30 TABLET | Refills: 5 | Status: SHIPPED | OUTPATIENT
Start: 2018-12-12 | End: 2019-06-05 | Stop reason: SDUPTHER

## 2018-12-12 RX ORDER — METOPROLOL TARTRATE 50 MG/1
TABLET, FILM COATED ORAL
Qty: 180 TABLET | Refills: 1 | Status: SHIPPED | OUTPATIENT
Start: 2018-12-12 | End: 2018-12-12 | Stop reason: SDUPTHER

## 2018-12-12 RX ORDER — PREDNISONE 1 MG/1
5 TABLET ORAL DAILY
COMMUNITY
End: 2019-08-14 | Stop reason: ALTCHOICE

## 2018-12-12 RX ORDER — CYCLOBENZAPRINE HCL 10 MG
TABLET ORAL
Qty: 30 TABLET | Refills: 2 | Status: SHIPPED | OUTPATIENT
Start: 2018-12-12 | End: 2019-06-05 | Stop reason: SDUPTHER

## 2018-12-12 ASSESSMENT — ENCOUNTER SYMPTOMS
DIARRHEA: 0
EYE DISCHARGE: 0
CONSTIPATION: 0
SINUS PRESSURE: 0
ABDOMINAL PAIN: 0
SHORTNESS OF BREATH: 0
RHINORRHEA: 0
COUGH: 0
BLOOD IN STOOL: 0
CHEST TIGHTNESS: 0

## 2018-12-12 NOTE — PROGRESS NOTES
[facility]  [address], [address]  [phone]    Rigo Renee is a 46 y.o. female who presents today for her  medical conditions/complaintsas noted below. Rigo Renee is c/o of Medication Refill and Health Maintenance (Discussed HM refused flu vaccine)  . HPI:     HPI  Hypertension: Patient here for follow-up of elevated blood pressure. She is not exercising and is not adherent to low salt diet. Blood pressure unknown well controlled at home. Cardiac symptoms none. Patient denies chest pain, chest pressure/discomfort, exertional chest pressure/discomfort, irregular heart beat, lower extremity edema and palpitations. Cardiovascular risk factors: hypertension, obesity (BMI >= 30 kg/m2) and sedentary lifestyle. Use of agents associated with hypertension: none. History of target organ damage: none. Weight is still up. Should have been out of bp meds in October. She started eating better this week. She has been out of work since April, her med prices and co pays have increased, im not sure if this is the cause of of her non compliance. The 10-year ASCVD risk score (Pam Guaman., et al., 2013) is: 4.6%    Values used to calculate the score:      Age: 46 years      Sex: Female      Is Non- : Yes      Diabetic: No      Tobacco smoker: No      Systolic Blood Pressure: 468 mmHg      Is BP treated: Yes      HDL Cholesterol: 95 mg/dL      Total Cholesterol: 211 mg/dL    Taking muscle relaxers for back spasms. Cyclobenzaprine helps enough to get her through work. She only takes     Nursing note reviewedand discussed with patient. Patient'smedications, allergies, past medical, surgical, social and family histories werereviewed and updated as appropriate.   Current Outpatient Prescriptions on File Prior to Visit   Medication Sig Dispense Refill    levothyroxine (SYNTHROID) 100 MCG tablet Take 1 tablet by mouth Daily 30 tablet 5    hydroxychloroquine (PLAQUENIL) 200 MG tablet 1 tablet with food or milk      sulfaSALAzine (AZULFIDINE) 500 MG EC tablet TK 2 TS PO BID  2    HYDROcodone-acetaminophen (NORCO) 5-325 MG per tablet Take 1 tablet by mouth 2 times daily as needed for Pain . 20 tablet 0     No current facility-administered medications on file prior to visit. Past Medical History:   Diagnosis Date    Anxiety     DUB (dysfunctional uterine bleeding)     Fibromyalgia 3/19/2018    Headache 2/11/2014    Hyperlipemia     Hypothyroidism     Iron deficiency anemia     Lupus     Neuropathy 3/19/2018    Obesity     Osteoarthritis     Tilt table evaluation 03/21/2018    Uncontrolled hypertension 8/18/2013      Past Surgical History:   Procedure Laterality Date    ENDOMETRIAL ABLATION  9/17/2010    D & C, Hysterscopy with NovaSure device    HYSTERECTOMY  2/11/2011    Da Cornelia Hysterectomy     Family History   Problem Relation Age of Onset    High Blood Pressure Mother     High Blood Pressure Father     Diabetes Brother     High Blood Pressure Brother     Breast Cancer Maternal Aunt     High Blood Pressure Maternal Grandmother     High Blood Pressure Maternal Grandfather     High Blood Pressure Paternal Grandmother     Breast Cancer Paternal Grandmother     High Blood Pressure Paternal Grandfather     High Blood Pressure Son      Social History   Substance Use Topics    Smoking status: Never Smoker    Smokeless tobacco: Never Used    Alcohol use No      No Known Allergies    Subjective:     Review of Systems   Constitutional: Negative for activity change, appetite change, chills, fatigue and fever. HENT: Negative for congestion, ear pain, rhinorrhea and sinus pressure. Eyes: Negative for discharge and visual disturbance. Respiratory: Negative for cough, chest tightness and shortness of breath. Cardiovascular: Negative for chest pain, palpitations and leg swelling.    Gastrointestinal:

## 2018-12-12 NOTE — PROGRESS NOTES
Visit Information    Have you changed or started any medications since your last visit including any over-the-counter medicines, vitamins, or herbal medicines? no   Are you having any side effects from any of your medications? -  no  Have you stopped taking any of your medications? Is so, why? -  no    Have you seen any other physician or provider since your last visit? No  Have you had any other diagnostic tests since your last visit? No  Have you been seen in the emergency room and/or had an admission to a hospital since we last saw you? No  Have you had your routine dental cleaning in the past 6 months? no    Have you activated your [employer] account? If not, what are your barriers?  Yes     Patient Care Team:  Thea Gowers, APRN - CNP as PCP - General (Certified Nurse Practitioner)  Thea Gowers, APRN - CNP as PCP - S Attributed Provider    Medical History Review  Past Medical, Family, and Social History reviewed and does contribute to the patient presenting condition    Health Maintenance   Topic Date Due    Cervical cancer screen  06/18/2016    Shingles Vaccine (1 of 2 - 2 Dose Series) 07/14/2018    DTaP/Tdap/Td vaccine (1 - Tdap) 01/19/2019 (Originally 7/14/1987)    HIV screen  01/19/2019 (Originally 7/14/1983)    Flu vaccine (1) 03/12/2019 (Originally 9/1/2018)    A1C test (Diabetic or Prediabetic)  06/26/2019    Breast cancer screen  07/17/2019    TSH testing  09/13/2019    Potassium monitoring  09/13/2019    Creatinine monitoring  09/13/2019    Colon Cancer Screen FIT/FOBT  09/19/2019    Lipid screen  09/13/2023

## 2019-06-05 ENCOUNTER — OFFICE VISIT (OUTPATIENT)
Dept: PRIMARY CARE CLINIC | Age: 49
End: 2019-06-05
Payer: COMMERCIAL

## 2019-06-05 VITALS
DIASTOLIC BLOOD PRESSURE: 88 MMHG | WEIGHT: 184 LBS | HEART RATE: 81 BPM | BODY MASS INDEX: 33.86 KG/M2 | OXYGEN SATURATION: 97 % | HEIGHT: 62 IN | SYSTOLIC BLOOD PRESSURE: 122 MMHG

## 2019-06-05 DIAGNOSIS — M62.830 BACK SPASM: ICD-10-CM

## 2019-06-05 DIAGNOSIS — I10 ESSENTIAL HYPERTENSION: Primary | ICD-10-CM

## 2019-06-05 DIAGNOSIS — E03.9 HYPOTHYROIDISM, UNSPECIFIED TYPE: ICD-10-CM

## 2019-06-05 DIAGNOSIS — E78.2 MIXED HYPERLIPIDEMIA: ICD-10-CM

## 2019-06-05 PROCEDURE — 99214 OFFICE O/P EST MOD 30 MIN: CPT | Performed by: NURSE PRACTITIONER

## 2019-06-05 RX ORDER — AZATHIOPRINE 50 MG/1
TABLET ORAL
Refills: 2 | COMMUNITY
Start: 2019-05-22

## 2019-06-05 RX ORDER — CYCLOBENZAPRINE HCL 10 MG
TABLET ORAL
Qty: 30 TABLET | Refills: 2 | Status: SHIPPED | OUTPATIENT
Start: 2019-06-05 | End: 2019-09-24 | Stop reason: SDUPTHER

## 2019-06-05 RX ORDER — METOPROLOL TARTRATE 50 MG/1
50 TABLET, FILM COATED ORAL 2 TIMES DAILY
Qty: 60 TABLET | Refills: 11 | Status: SHIPPED | OUTPATIENT
Start: 2019-06-05 | End: 2019-08-21

## 2019-06-05 RX ORDER — LEVOTHYROXINE SODIUM 0.1 MG/1
100 TABLET ORAL DAILY
Qty: 30 TABLET | Refills: 5 | Status: SHIPPED | OUTPATIENT
Start: 2019-06-05 | End: 2020-01-15 | Stop reason: SDUPTHER

## 2019-06-05 RX ORDER — SIMVASTATIN 20 MG
20 TABLET ORAL EVERY EVENING
Qty: 30 TABLET | Refills: 5 | Status: SHIPPED | OUTPATIENT
Start: 2019-06-05 | End: 2020-01-15 | Stop reason: SDUPTHER

## 2019-06-05 RX ORDER — VERAPAMIL HYDROCHLORIDE 360 MG/1
CAPSULE, DELAYED RELEASE PELLETS ORAL
Qty: 30 CAPSULE | Refills: 5 | Status: SHIPPED | OUTPATIENT
Start: 2019-06-05 | End: 2019-08-21 | Stop reason: SDUPTHER

## 2019-06-05 ASSESSMENT — PATIENT HEALTH QUESTIONNAIRE - PHQ9
SUM OF ALL RESPONSES TO PHQ9 QUESTIONS 1 & 2: 0
1. LITTLE INTEREST OR PLEASURE IN DOING THINGS: 0
SUM OF ALL RESPONSES TO PHQ QUESTIONS 1-9: 0
SUM OF ALL RESPONSES TO PHQ QUESTIONS 1-9: 0
2. FEELING DOWN, DEPRESSED OR HOPELESS: 0

## 2019-06-05 ASSESSMENT — ENCOUNTER SYMPTOMS
SHORTNESS OF BREATH: 0
COUGH: 0
BACK PAIN: 1

## 2019-06-05 NOTE — PROGRESS NOTES
Visit Information    Have you changed or started any medications since your last visit including any over-the-counter medicines, vitamins, or herbal medicines? no   Are you having any side effects from any of your medications? -  no  Have you stopped taking any of your medications? Is so, why? -  no    Have you seen any other physician or provider since your last visit? Yes - Records Obtained  Have you had any other diagnostic tests since your last visit? Yes - Records Requested  Have you been seen in the emergency room and/or had an admission to a hospital since we last saw you? No  Have you had your routine dental cleaning in the past 6 months? no    Have you activated your [employer] account? If not, what are your barriers?  Yes     Patient Care Team:  ORVILLE Duff CNP as PCP - General (Certified Nurse Practitioner)  ORVILLE Duff CNP as PCP - [facility] Provider    Medical History Review  Past Medical, Family, and Social History reviewed and does contribute to the patient presenting condition    Health Maintenance   Topic Date Due    HIV screen  07/14/1983    DTaP/Tdap/Td vaccine (1 - Tdap) 07/14/1987    Cervical cancer screen  06/18/2016    Shingles Vaccine (1 of 2) 07/14/2018    A1C test (Diabetic or Prediabetic)  06/26/2019    Breast cancer screen  07/17/2019    Flu vaccine (Season Ended) 09/01/2019    TSH testing  09/13/2019    Potassium monitoring  09/13/2019    Creatinine monitoring  09/13/2019    Colon Cancer Screen FIT/FOBT  09/19/2019    Lipid screen  09/13/2023    Pneumococcal 0-64 years Vaccine  Aged Out

## 2019-06-05 NOTE — PROGRESS NOTES
[facility]  [address], [address]  [phone]    Bambi Del Rosario is a 46 y.o. female who presents today for her  medical conditions/complaintsas noted below. Bambi Del Rosario is c/o of Medication Refill and Check-Up    HPI:     HPI  Hypertension: Patient here for follow-up of elevated blood pressure. She is not exercising and is adherent to low salt diet. Blood pressure is well controlled at home. Cardiac symptoms none. Patient denies chest pain, chest pressure/discomfort, exertional chest pressure/discomfort, irregular heart beat and lower extremity edema. Cardiovascular risk factors: dyslipidemia, hypertension and obesity (BMI >= 30 kg/m2). Use of agents associated with hypertension: thyroid hormones. History of target organ damage: none. Wt Readings from Last 3 Encounters:   06/05/19 184 lb (83.5 kg)   12/12/18 191 lb (86.6 kg)   08/29/18 189 lb (85.7 kg)     Hypothyroid  Lab Results   Component Value Date    TSH 2.97 09/13/2018     Needs med refill. Nursing note reviewedand discussed with patient. Patient'smedications, allergies, past medical, surgical, social and family histories werereviewed and updated as appropriate. Current Outpatient Medications on File Prior to Visit   Medication Sig Dispense Refill    azaTHIOprine (IMURAN) 50 MG tablet TK 1 T PO D  2    predniSONE (DELTASONE) 5 MG tablet Take 5 mg by mouth daily      hydroxychloroquine (PLAQUENIL) 200 MG tablet 1 tablet with food or milk      HYDROcodone-acetaminophen (NORCO) 5-325 MG per tablet Take 1 tablet by mouth 2 times daily as needed for Pain . 20 tablet 0    sulfaSALAzine (AZULFIDINE) 500 MG EC tablet TK 2 TS PO BID  2     No current facility-administered medications on file prior to visit.       Past Medical History:   Diagnosis Date    Anxiety     DUB (dysfunctional uterine bleeding)     Fibromyalgia 3/19/2018    Headache 2/11/2014    Hyperlipemia     Hypothyroidism     Iron deficiency anemia     Lupus     Neuropathy 3/19/2018    Obesity     Osteoarthritis     Tilt table evaluation 03/21/2018    Uncontrolled hypertension 8/18/2013      Past Surgical History:   Procedure Laterality Date    ENDOMETRIAL ABLATION  9/17/2010    D & C, Hysterscopy with NovaSure device    HYSTERECTOMY  2/11/2011    Da Cornelia Hysterectomy     Family History   Problem Relation Age of Onset    High Blood Pressure Mother     High Blood Pressure Father     Diabetes Brother     High Blood Pressure Brother     Breast Cancer Maternal Aunt     High Blood Pressure Maternal Grandmother     High Blood Pressure Maternal Grandfather     High Blood Pressure Paternal Grandmother     Breast Cancer Paternal Grandmother     High Blood Pressure Paternal Grandfather     High Blood Pressure Son      Social History     Tobacco Use    Smoking status: Never Smoker    Smokeless tobacco: Never Used   Substance Use Topics    Alcohol use: No      No Known Allergies    Subjective:     Review of Systems   Constitutional: Negative for chills and fever. Respiratory: Negative for cough and shortness of breath. Cardiovascular: Negative for chest pain, palpitations and leg swelling. Musculoskeletal: Positive for back pain (helped with cyclobenzaprine). Objective:     /88 (Site: Left Upper Arm, Position: Sitting, Cuff Size: Large Adult)   Pulse 81   Ht 5' 2\" (1.575 m)   Wt 184 lb (83.5 kg)   LMP  (LMP Unknown)   SpO2 97%   Breastfeeding? No   BMI 33.65 kg/m²    Physical Exam   Constitutional: She is oriented to person, place, and time. She appears well-developed and well-nourished. She is active. HENT:   Right Ear: External ear normal.   Left Ear: External ear normal.   Nose: Nose normal.   Eyes: EOM are normal.   Neck: Normal range of motion and full passive range of motion without pain.    Cardiovascular: Normal rate, regular rhythm, S1 normal, S2 normal and normal heart sounds. Pulmonary/Chest: Effort normal and breath sounds normal. No respiratory distress. Musculoskeletal: Normal range of motion. She exhibits no deformity. Neurological: She is alert and oriented to person, place, and time. Skin: Skin is warm and dry. Psychiatric: She has a normal mood and affect. Assessment/Plan      1. Essential hypertension  Stable  Continue med   - verapamil (VERELAN) 360 MG extended release capsule; TAKE 1 CAPSULE BY MOUTH EVERY NIGHT  Dispense: 30 capsule; Refill: 5    2. Back spasm  stable  - cyclobenzaprine (FLEXERIL) 10 MG tablet; TAKE 1 TABLET BY MOUTH EVERY NIGHT AT BEDTIME AS NEEDED FOR MUSCLE SPASMS  Dispense: 30 tablet; Refill: 2    3. Hypothyroidism, unspecified type    - levothyroxine (SYNTHROID) 100 MCG tablet; Take 1 tablet by mouth Daily  Dispense: 30 tablet; Refill: 5    4. Mixed hyperlipidemia    - simvastatin (ZOCOR) 20 MG tablet; Take 1 tablet by mouth every evening  Dispense: 30 tablet; Refill: 5    RTO if symptoms worsen or fail to improve  Pt agreeable with plan      Patient given educationalmaterials - see patient instructions. Discussed use, benefit, and side effectsof prescribed medications. All patient questions answered. Pt voiced understanding. Reviewed health maintenance. Instructed to continue current medications, diet andexercise. 1.  Sarah received counseling on the following healthy behaviors: nutrition, exercise and medication adherence  2. Patient given educational materials when available - see patient instructionswhen applicable  3. Discussed use, benefit, and side effects of prescribed medications. Barriersto medication compliance addressed. All patient questions answered. Pt voicedunderstanding. 4.  Reviewed prior labs and health maintenance  5. Continuecurrent medications, diet and exercise.     Completed Refills   Requested Prescriptions     Signed Prescriptions Disp Refills    cyclobenzaprine (FLEXERIL) 10 MG tablet 30 tablet 2     Sig: TAKE 1 TABLET BY MOUTH EVERY NIGHT AT BEDTIME AS NEEDED FOR MUSCLE SPASMS    levothyroxine (SYNTHROID) 100 MCG tablet 30 tablet 5     Sig: Take 1 tablet by mouth Daily    verapamil (VERELAN) 360 MG extended release capsule 30 capsule 5     Sig: TAKE 1 CAPSULE BY MOUTH EVERY NIGHT    simvastatin (ZOCOR) 20 MG tablet 30 tablet 5     Sig: Take 1 tablet by mouth every evening    metoprolol tartrate (LOPRESSOR) 50 MG tablet 60 tablet 11     Sig: Take 1 tablet by mouth 2 times daily         Electronically signed by Flor Benites CNP on 6/5/2019 at 3:37 PM

## 2019-07-25 DIAGNOSIS — E03.9 HYPOTHYROIDISM, UNSPECIFIED TYPE: ICD-10-CM

## 2019-07-25 RX ORDER — LEVOTHYROXINE SODIUM 0.1 MG/1
TABLET ORAL
Qty: 30 TABLET | Refills: 5 | OUTPATIENT
Start: 2019-07-25

## 2019-08-14 ENCOUNTER — TELEPHONE (OUTPATIENT)
Dept: PRIMARY CARE CLINIC | Age: 49
End: 2019-08-14

## 2019-08-14 ENCOUNTER — HOSPITAL ENCOUNTER (EMERGENCY)
Age: 49
Discharge: HOME | End: 2019-08-14
Attending: EMERGENCY MEDICINE
Payer: COMMERCIAL

## 2019-08-14 VITALS
DIASTOLIC BLOOD PRESSURE: 79 MMHG | WEIGHT: 184.8 LBS | BODY MASS INDEX: 34.01 KG/M2 | TEMPERATURE: 97.3 F | SYSTOLIC BLOOD PRESSURE: 154 MMHG | HEIGHT: 62 IN | OXYGEN SATURATION: 98 % | RESPIRATION RATE: 22 BRPM | HEART RATE: 70 BPM

## 2019-08-14 DIAGNOSIS — I10 ESSENTIAL HYPERTENSION: Primary | ICD-10-CM

## 2019-08-14 LAB
ABSOLUTE EOS #: 0.2 K/UL (ref 0–0.44)
ABSOLUTE IMMATURE GRANULOCYTE: 0.01 K/UL (ref 0–0.3)
ABSOLUTE LYMPH #: 2.16 K/UL (ref 1.1–3.7)
ABSOLUTE MONO #: 0.47 K/UL (ref 0.1–1.2)
ANION GAP SERPL CALCULATED.3IONS-SCNC: 13 MMOL/L (ref 9–17)
BASOPHILS # BLD: 1 % (ref 0–2)
BASOPHILS ABSOLUTE: 0.04 K/UL (ref 0–0.2)
BUN BLDV-MCNC: 11 MG/DL (ref 6–20)
BUN/CREAT BLD: 19 (ref 9–20)
CALCIUM SERPL-MCNC: 9.8 MG/DL (ref 8.6–10.4)
CHLORIDE BLD-SCNC: 100 MMOL/L (ref 98–107)
CO2: 28 MMOL/L (ref 20–31)
CREAT SERPL-MCNC: 0.57 MG/DL (ref 0.5–0.9)
DIFFERENTIAL TYPE: ABNORMAL
EOSINOPHILS RELATIVE PERCENT: 3 % (ref 1–4)
GFR AFRICAN AMERICAN: >60 ML/MIN
GFR NON-AFRICAN AMERICAN: >60 ML/MIN
GFR SERPL CREATININE-BSD FRML MDRD: ABNORMAL ML/MIN/{1.73_M2}
GFR SERPL CREATININE-BSD FRML MDRD: ABNORMAL ML/MIN/{1.73_M2}
GLUCOSE BLD-MCNC: 139 MG/DL (ref 70–99)
HCT VFR BLD CALC: 35.2 % (ref 36.3–47.1)
HEMOGLOBIN: 11.5 G/DL (ref 11.9–15.1)
IMMATURE GRANULOCYTES: 0 %
LYMPHOCYTES # BLD: 33 % (ref 24–43)
MCH RBC QN AUTO: 27.1 PG (ref 25.2–33.5)
MCHC RBC AUTO-ENTMCNC: 32.7 G/DL (ref 28.4–34.8)
MCV RBC AUTO: 82.8 FL (ref 82.6–102.9)
MONOCYTES # BLD: 7 % (ref 3–12)
MYOGLOBIN: <21 NG/ML (ref 25–58)
NRBC AUTOMATED: ABNORMAL PER 100 WBC
PDW BLD-RTO: 13.2 % (ref 11.8–14.4)
PLATELET # BLD: 424 K/UL (ref 138–453)
PLATELET ESTIMATE: ABNORMAL
PMV BLD AUTO: 10.3 FL (ref 8.1–13.5)
POTASSIUM SERPL-SCNC: 3 MMOL/L (ref 3.7–5.3)
RBC # BLD: 4.25 M/UL (ref 3.95–5.11)
RBC # BLD: ABNORMAL 10*6/UL
SEG NEUTROPHILS: 55 % (ref 36–65)
SEGMENTED NEUTROPHILS ABSOLUTE COUNT: 3.59 K/UL (ref 1.5–8.1)
SODIUM BLD-SCNC: 141 MMOL/L (ref 135–144)
TROPONIN INTERP: ABNORMAL
TROPONIN T: ABNORMAL NG/ML
TROPONIN, HIGH SENSITIVITY: 7 NG/L (ref 0–14)
WBC # BLD: 6.5 K/UL (ref 3.5–11.3)
WBC # BLD: ABNORMAL 10*3/UL

## 2019-08-14 PROCEDURE — 96374 THER/PROPH/DIAG INJ IV PUSH: CPT

## 2019-08-14 PROCEDURE — 80048 BASIC METABOLIC PNL TOTAL CA: CPT

## 2019-08-14 PROCEDURE — 6360000002 HC RX W HCPCS: Performed by: NURSE PRACTITIONER

## 2019-08-14 PROCEDURE — 83874 ASSAY OF MYOGLOBIN: CPT

## 2019-08-14 PROCEDURE — 85025 COMPLETE CBC W/AUTO DIFF WBC: CPT

## 2019-08-14 PROCEDURE — 93005 ELECTROCARDIOGRAM TRACING: CPT | Performed by: NURSE PRACTITIONER

## 2019-08-14 PROCEDURE — 84484 ASSAY OF TROPONIN QUANT: CPT

## 2019-08-14 PROCEDURE — 99284 EMERGENCY DEPT VISIT MOD MDM: CPT

## 2019-08-14 RX ORDER — HYDRALAZINE HYDROCHLORIDE 20 MG/ML
5 INJECTION INTRAMUSCULAR; INTRAVENOUS ONCE
Status: COMPLETED | OUTPATIENT
Start: 2019-08-14 | End: 2019-08-14

## 2019-08-14 RX ORDER — CLONIDINE HYDROCHLORIDE 0.1 MG/1
0.1 TABLET ORAL 2 TIMES DAILY PRN
Qty: 10 TABLET | Refills: 0 | Status: SHIPPED | OUTPATIENT
Start: 2019-08-14 | End: 2020-01-15

## 2019-08-14 RX ADMIN — HYDRALAZINE HYDROCHLORIDE 5 MG: 20 INJECTION INTRAMUSCULAR; INTRAVENOUS at 20:29

## 2019-08-14 ASSESSMENT — PAIN DESCRIPTION - DESCRIPTORS: DESCRIPTORS: ACHING;DISCOMFORT

## 2019-08-14 ASSESSMENT — PAIN DESCRIPTION - LOCATION: LOCATION: HEAD

## 2019-08-14 ASSESSMENT — PAIN SCALES - GENERAL: PAINLEVEL_OUTOF10: 6

## 2019-08-14 ASSESSMENT — PAIN DESCRIPTION - PAIN TYPE: TYPE: ACUTE PAIN

## 2019-08-14 NOTE — TELEPHONE ENCOUNTER
PATIENT CALLED SAID HER BLOOD PRESSURE HAS BEEN RUNNING HIGH. LAST Friday 8/9/19 IT /130, THIS MORNING IT /114 AND AT 130PM TODAY IT /113.  SHE IS HAVING HEADACHES WITH IT AND WANTS TO KNOW WHAT TO DO.

## 2019-08-15 ENCOUNTER — TELEPHONE (OUTPATIENT)
Dept: PRIMARY CARE CLINIC | Age: 49
End: 2019-08-15

## 2019-08-15 LAB
EKG ATRIAL RATE: 78 BPM
EKG P AXIS: 62 DEGREES
EKG P-R INTERVAL: 162 MS
EKG Q-T INTERVAL: 412 MS
EKG QRS DURATION: 88 MS
EKG QTC CALCULATION (BAZETT): 469 MS
EKG R AXIS: 43 DEGREES
EKG T AXIS: 54 DEGREES
EKG VENTRICULAR RATE: 78 BPM

## 2019-08-15 ASSESSMENT — ENCOUNTER SYMPTOMS
COUGH: 0
SHORTNESS OF BREATH: 0
DIARRHEA: 0
COLOR CHANGE: 0
SORE THROAT: 0
SINUS PRESSURE: 0
ABDOMINAL PAIN: 0
NAUSEA: 0
VOMITING: 0
CONSTIPATION: 0
WHEEZING: 0
RHINORRHEA: 0

## 2019-08-15 NOTE — TELEPHONE ENCOUNTER
[facility] ([facility]) ED Follow up Call    Reason for ED visit:  High blood pressure     8/15/2019     Hi Sarah , this is Aster from Dr. Raymundo Ba office, just calling to see how you are doing after your recent ED visit. Did you receive discharge instructions? Yes  Do you understand the discharge instructions? Yes  Did the ED give you any new prescriptions? Yes  Were you able to fill your prescriptions? No, she will today      Do you have one of our red, yellow and green  Zone sheets that help you to determine when you should go to the ED? Yes, Patient was told to for to the ED. Do you need or want to make a follow up appt with your PCP? Yes, done  Do you have any further needs in the home, e.g. equipment? No        FU appts/Provider:    Future Appointments   Date Time Provider Steph Vizcarra   8/21/2019  4:30 PM ORVILLE Murguia CNP [facility]         [employer] DOCUMENTATION - ERASE IF NOT USED  Hi, this message is for Eugenia. This is Nuvia Anderson from [facility] office. Just calling to see how you are doing after your recent visit to the Emergency Room. [employer] wants to make sure you were able to fill any prescriptions and that you understand your discharge instructions. Please return our call if you need to make a follow up appointment with your provider or have any further needs. Our phone number is [phone]. Have a great day.

## 2019-08-15 NOTE — ED PROVIDER NOTES
throat. Respiratory: Negative for cough, shortness of breath and wheezing. Cardiovascular: Negative for chest pain and palpitations. Gastrointestinal: Negative for abdominal pain, constipation, diarrhea, nausea and vomiting. Genitourinary: Negative for dysuria and hematuria. Musculoskeletal: Negative for arthralgias and myalgias. Skin: Negative for color change and rash. Neurological: Negative for dizziness, weakness and headaches. Hematological: Negative for adenopathy. Except as noted above the remainder of the review of systems was reviewed and negative. PHYSICAL EXAM    (up to 7 for level 4, 8 or more for level 5)     ED Triage Vitals   BP Temp Temp src Pulse Resp SpO2 Height Weight   08/14/19 1806 08/14/19 1806 -- 08/14/19 1806 08/14/19 1806 08/14/19 1806 08/14/19 1807 08/14/19 1807   (!) 180/95 97.3 °F (36.3 °C)  79 16 99 % 5' 2\" (1.575 m) 184 lb 12.8 oz (83.8 kg)       Physical Exam   Constitutional: She is oriented to person, place, and time. She appears well-developed and well-nourished. HENT:   Head: Normocephalic and atraumatic. Mouth/Throat: Oropharynx is clear and moist.   Eyes: Pupils are equal, round, and reactive to light. Conjunctivae are normal.   Neck: Normal range of motion. Neck supple. Cardiovascular: Normal rate and regular rhythm. Pulmonary/Chest: Effort normal and breath sounds normal. No stridor. No respiratory distress. Abdominal: Soft. Bowel sounds are normal.   Musculoskeletal: Normal range of motion. Lymphadenopathy:     She has no cervical adenopathy. Neurological: She is alert and oriented to person, place, and time. Skin: Skin is warm and dry. No rash noted. Psychiatric: She has a normal mood and affect. Vitals reviewed.       LABS:  Labs Reviewed   CBC WITH AUTO DIFFERENTIAL - Abnormal; Notable for the following components:       Result Value    Hemoglobin 11.5 (*)     Hematocrit 35.2 (*)     All other components within normal limits

## 2019-08-21 ENCOUNTER — OFFICE VISIT (OUTPATIENT)
Dept: PRIMARY CARE CLINIC | Age: 49
End: 2019-08-21
Payer: COMMERCIAL

## 2019-08-21 VITALS
DIASTOLIC BLOOD PRESSURE: 95 MMHG | WEIGHT: 185.2 LBS | HEART RATE: 72 BPM | BODY MASS INDEX: 33.87 KG/M2 | TEMPERATURE: 98.6 F | SYSTOLIC BLOOD PRESSURE: 150 MMHG | OXYGEN SATURATION: 100 %

## 2019-08-21 DIAGNOSIS — Z13.1 SCREENING FOR DIABETES MELLITUS: ICD-10-CM

## 2019-08-21 DIAGNOSIS — Z12.39 BREAST CANCER SCREENING: ICD-10-CM

## 2019-08-21 DIAGNOSIS — I10 ESSENTIAL HYPERTENSION: Primary | ICD-10-CM

## 2019-08-21 PROCEDURE — 99214 OFFICE O/P EST MOD 30 MIN: CPT | Performed by: NURSE PRACTITIONER

## 2019-08-21 RX ORDER — METOPROLOL TARTRATE 75 MG/1
TABLET, FILM COATED ORAL
Qty: 180 TABLET | Refills: 1 | Status: SHIPPED | OUTPATIENT
Start: 2019-08-21 | End: 2020-01-15 | Stop reason: DRUGHIGH

## 2019-08-21 RX ORDER — VERAPAMIL HYDROCHLORIDE 360 MG/1
CAPSULE, DELAYED RELEASE PELLETS ORAL
Qty: 30 CAPSULE | Refills: 5 | Status: SHIPPED | OUTPATIENT
Start: 2019-08-21 | End: 2020-01-15 | Stop reason: SDUPTHER

## 2019-08-21 ASSESSMENT — ENCOUNTER SYMPTOMS
COUGH: 0
SHORTNESS OF BREATH: 0

## 2019-08-21 NOTE — PROGRESS NOTES
Visit Information    Have you changed or started any medications since your last visit including any over-the-counter medicines, vitamins, or herbal medicines? no   Are you having any side effects from any of your medications? -  no  Have you stopped taking any of your medications? Is so, why? -  no    Have you seen any other physician or provider since your last visit? No  Have you had any other diagnostic tests since your last visit? No  Have you been seen in the emergency room and/or had an admission to a hospital since we last saw you? Yes - Records Obtained  Have you had your routine dental cleaning in the past 6 months? no    Have you activated your [employer] account? If not, what are your barriers?  Yes     Patient Care Team:  ORVILLE Padilla CNP as PCP - General (Certified Nurse Practitioner)  ORVILLE Padilla CNP as PCP - [facility]    Medical History Review  Past Medical, Family, and Social History reviewed and does  contribute to the patient presenting condition    Health Maintenance   Topic Date Due    HIV screen  07/14/1983    Cervical cancer screen  06/18/2016    Shingles Vaccine (1 of 2) 07/14/2018    A1C test (Diabetic or Prediabetic)  06/26/2019    Breast cancer screen  07/17/2019    Colon Cancer Screen FIT/FOBT  09/19/2019    DTaP/Tdap/Td vaccine (1 - Tdap) 06/05/2020 (Originally 7/14/1987)    Flu vaccine (1) 09/01/2019    TSH testing  09/13/2019    Potassium monitoring  08/14/2020    Creatinine monitoring  08/14/2020    Lipid screen  09/13/2023    Pneumococcal 0-64 years Vaccine  Aged Out

## 2019-08-21 NOTE — PROGRESS NOTES
Neuropathy 3/19/2018    Obesity     Osteoarthritis     Tilt table evaluation 03/21/2018    Uncontrolled hypertension 8/18/2013      Past Surgical History:   Procedure Laterality Date    ENDOMETRIAL ABLATION  9/17/2010    D & C, Hysterscopy with NovaSure device    HYSTERECTOMY  2/11/2011    Da Cornelia Hysterectomy     Family History   Problem Relation Age of Onset    High Blood Pressure Mother     High Blood Pressure Father     Diabetes Brother     High Blood Pressure Brother     Breast Cancer Maternal Aunt     High Blood Pressure Maternal Grandmother     High Blood Pressure Maternal Grandfather     High Blood Pressure Paternal Grandmother     Breast Cancer Paternal Grandmother     High Blood Pressure Paternal Grandfather     High Blood Pressure Son      Social History     Tobacco Use    Smoking status: Never Smoker    Smokeless tobacco: Never Used   Substance Use Topics    Alcohol use: No      No Known Allergies    Subjective:     Review of Systems   Constitutional: Negative for chills and fever. Respiratory: Negative for cough and shortness of breath. Cardiovascular: Negative for chest pain, palpitations and leg swelling. Objective:     BP (!) 150/95   Pulse 72   Temp 98.6 °F (37 °C) (Oral)   Wt 185 lb 3.2 oz (84 kg)   LMP  (LMP Unknown)   SpO2 100%   BMI 33.87 kg/m²    Physical Exam   Constitutional: She is oriented to person, place, and time. She appears well-developed and well-nourished. She is active. HENT:   Right Ear: External ear normal.   Left Ear: External ear normal.   Nose: Nose normal.   Eyes: EOM are normal.   Neck: Normal range of motion and full passive range of motion without pain. Cardiovascular: Normal rate, regular rhythm, S1 normal, S2 normal and normal heart sounds. Pulmonary/Chest: Effort normal and breath sounds normal. No respiratory distress. Musculoskeletal: Normal range of motion. She exhibits no deformity.    Neurological: She is alert and oriented to person, place, and time. Skin: Skin is warm and dry. Psychiatric: She has a normal mood and affect. Assessment/Plan      1. Essential hypertension  Increase metoprolol  Encouraged compliance  rto in 1 month   - Metoprolol Tartrate 75 MG TABS; TAKE 1 TABLET BY MOUTH TWICE DAILY  Dispense: 180 tablet; Refill: 1  - verapamil (VERELAN) 360 MG extended release capsule; TAKE 1 CAPSULE BY MOUTH EVERY NIGHT  Dispense: 30 capsule; Refill: 5    2. Breast cancer screening    - [facility] DIGITAL SCREEN W CAD BILATERAL; Future    3. Screen dm  discussed cutting back on carbs  - POCT glycosylated hemoglobin (Hb A1C)        RTO if symptoms worsen or fail to improve  Pt agreeable with plan      Patient given educationalmaterials - see patient instructions. Discussed use, benefit, and side effectsof prescribed medications. All patient questions answered. Pt voiced understanding. Reviewed health maintenance. Instructed to continue current medications, diet andexercise. 1.  Sarah received counseling on the following healthy behaviors: nutrition, exercise and medication adherence  2. Patient given educational materials when available - see patient instructionswhen applicable  3. Discussed use, benefit, and side effects of prescribed medications. Barriersto medication compliance addressed. All patient questions answered. Pt voicedunderstanding. 4.  Reviewed prior labs and health maintenance  5. Continuecurrent medications, diet and exercise.     Completed Refills   Requested Prescriptions     Signed Prescriptions Disp Refills    Metoprolol Tartrate 75 MG TABS 180 tablet 1     Sig: TAKE 1 TABLET BY MOUTH TWICE DAILY    verapamil (VERELAN) 360 MG extended release capsule 30 capsule 5     Sig: TAKE 1 CAPSULE BY MOUTH EVERY NIGHT         Electronically signed by Margaret Choudhury CNP on 8/21/2019 at 6:30 PM

## 2019-08-25 DIAGNOSIS — E03.9 HYPOTHYROIDISM, UNSPECIFIED TYPE: ICD-10-CM

## 2019-08-26 RX ORDER — LEVOTHYROXINE SODIUM 100 UG/1
TABLET ORAL
Qty: 30 TABLET | Refills: 5 | OUTPATIENT
Start: 2019-08-26

## 2019-08-26 NOTE — TELEPHONE ENCOUNTER
#1 this is a weird name  #2 I ordered synthroid in june with 5 refills.   Call pharmacy
calories     Essential hypertension     Mixed hyperlipidemia     Arthralgia of multiple joints     Hx of fracture of ankle     Hypothyroidism     Systemic lupus erythematosus (HCC)     DJD (degenerative joint disease), lumbosacral     Near syncope     Fibromyalgia     Neuropathy     Osteoarthritis of right hip

## 2019-09-23 DIAGNOSIS — M62.830 BACK SPASM: ICD-10-CM

## 2019-09-24 RX ORDER — CYCLOBENZAPRINE HCL 10 MG
TABLET ORAL
Qty: 30 TABLET | Refills: 2 | Status: SHIPPED | OUTPATIENT
Start: 2019-09-24 | End: 2019-12-30 | Stop reason: SDUPTHER

## 2019-09-25 ENCOUNTER — OFFICE VISIT (OUTPATIENT)
Dept: PRIMARY CARE CLINIC | Age: 49
End: 2019-09-25
Payer: COMMERCIAL

## 2019-09-25 VITALS
HEART RATE: 63 BPM | OXYGEN SATURATION: 99 % | BODY MASS INDEX: 33.84 KG/M2 | SYSTOLIC BLOOD PRESSURE: 146 MMHG | DIASTOLIC BLOOD PRESSURE: 91 MMHG | WEIGHT: 185 LBS

## 2019-09-25 DIAGNOSIS — I10 ESSENTIAL HYPERTENSION: Primary | ICD-10-CM

## 2019-09-25 PROCEDURE — 99213 OFFICE O/P EST LOW 20 MIN: CPT | Performed by: NURSE PRACTITIONER

## 2019-09-25 RX ORDER — HYDROCHLOROTHIAZIDE 12.5 MG/1
12.5 TABLET ORAL DAILY
Qty: 30 TABLET | Refills: 5 | Status: SHIPPED | OUTPATIENT
Start: 2019-09-25 | End: 2020-04-07 | Stop reason: SDUPTHER

## 2019-09-25 ASSESSMENT — ENCOUNTER SYMPTOMS
SHORTNESS OF BREATH: 0
COUGH: 0

## 2019-12-30 DIAGNOSIS — M62.830 BACK SPASM: ICD-10-CM

## 2019-12-30 RX ORDER — CYCLOBENZAPRINE HCL 10 MG
TABLET ORAL
Qty: 30 TABLET | Refills: 2 | Status: SHIPPED | OUTPATIENT
Start: 2019-12-30 | End: 2020-04-29 | Stop reason: SDUPTHER

## 2020-01-15 ENCOUNTER — OFFICE VISIT (OUTPATIENT)
Dept: PRIMARY CARE CLINIC | Age: 50
End: 2020-01-15
Payer: COMMERCIAL

## 2020-01-15 VITALS
DIASTOLIC BLOOD PRESSURE: 83 MMHG | WEIGHT: 188 LBS | BODY MASS INDEX: 34.39 KG/M2 | OXYGEN SATURATION: 98 % | SYSTOLIC BLOOD PRESSURE: 122 MMHG | HEART RATE: 75 BPM

## 2020-01-15 LAB
CONTROL: PRESENT
HBA1C MFR BLD: 6 %
HEMOCCULT STL QL: NEGATIVE

## 2020-01-15 PROCEDURE — 99214 OFFICE O/P EST MOD 30 MIN: CPT | Performed by: NURSE PRACTITIONER

## 2020-01-15 PROCEDURE — 82274 ASSAY TEST FOR BLOOD FECAL: CPT | Performed by: NURSE PRACTITIONER

## 2020-01-15 PROCEDURE — 83036 HEMOGLOBIN GLYCOSYLATED A1C: CPT | Performed by: NURSE PRACTITIONER

## 2020-01-15 RX ORDER — VERAPAMIL HYDROCHLORIDE 360 MG/1
CAPSULE, DELAYED RELEASE PELLETS ORAL
Qty: 30 CAPSULE | Refills: 5 | Status: SHIPPED | OUTPATIENT
Start: 2020-01-15 | End: 2020-08-07 | Stop reason: SDUPTHER

## 2020-01-15 RX ORDER — SIMVASTATIN 20 MG
20 TABLET ORAL EVERY EVENING
Qty: 30 TABLET | Refills: 5 | Status: SHIPPED | OUTPATIENT
Start: 2020-01-15 | End: 2020-08-07 | Stop reason: SDUPTHER

## 2020-01-15 RX ORDER — LEVOTHYROXINE SODIUM 0.1 MG/1
100 TABLET ORAL DAILY
Qty: 30 TABLET | Refills: 5 | Status: SHIPPED | OUTPATIENT
Start: 2020-01-15 | End: 2020-03-27 | Stop reason: SDUPTHER

## 2020-01-15 ASSESSMENT — PATIENT HEALTH QUESTIONNAIRE - PHQ9
SUM OF ALL RESPONSES TO PHQ QUESTIONS 1-9: 0
SUM OF ALL RESPONSES TO PHQ QUESTIONS 1-9: 0
2. FEELING DOWN, DEPRESSED OR HOPELESS: 0
SUM OF ALL RESPONSES TO PHQ9 QUESTIONS 1 & 2: 0
1. LITTLE INTEREST OR PLEASURE IN DOING THINGS: 0

## 2020-01-15 ASSESSMENT — ENCOUNTER SYMPTOMS
COUGH: 0
SHORTNESS OF BREATH: 0

## 2020-01-15 NOTE — PROGRESS NOTES
vaccine (1 - Tdap) 06/05/2020 (Originally 7/14/1981)    Breast cancer screen  07/17/2020    Potassium monitoring  08/14/2020    Creatinine monitoring  08/14/2020    Pneumococcal 0-64 years Vaccine  Aged Out

## 2020-01-15 NOTE — PROGRESS NOTES
[facility]  [address], [address]  [phone]    Benson Aburto is a 44 y.o. female who presents today for her  medical conditions/complaintsas noted below. Benson Aburto is c/o of Hypertension and Health Maintenance (will discuss with provider)  . HPI:     HPI   htn- bp much improved today. hctz did the trick. She is watching bp at home. Denies cp or other concerns. She has an apt for her mammo. Wt Readings from Last 3 Encounters:   01/15/20 188 lb (85.3 kg)   09/25/19 185 lb (83.9 kg)   08/21/19 185 lb 3.2 oz (84 kg)     Declines flu shot    Nursing note reviewedand discussed with patient. Patient'smedications, allergies, past medical, surgical, social and family histories werereviewed and updated as appropriate. Current Outpatient Medications on File Prior to Visit   Medication Sig Dispense Refill    metoprolol tartrate (LOPRESSOR) 25 MG tablet 25 mg 3 tablets =75 mg bid      cyclobenzaprine (FLEXERIL) 10 MG tablet TAKE 1 TABLET BY MOUTH EVERY NIGHT AT BEDTIME AS NEEDED FOR MUSCLE SPASMS 30 tablet 2    hydrochlorothiazide (HYDRODIURIL) 12.5 MG tablet Take 1 tablet by mouth daily 30 tablet 5    azaTHIOprine (IMURAN) 50 MG tablet TK 1 T PO D  2    hydroxychloroquine (PLAQUENIL) 200 MG tablet 1 tablet with food or milk      sulfaSALAzine (AZULFIDINE) 500 MG EC tablet TK 2 TS PO BID  2    HYDROcodone-acetaminophen (NORCO) 5-325 MG per tablet Take 1 tablet by mouth 2 times daily as needed for Pain . 20 tablet 0     No current facility-administered medications on file prior to visit.       Past Medical History:   Diagnosis Date    Anxiety     DUB (dysfunctional uterine bleeding)     Fibromyalgia 3/19/2018    Headache 2/11/2014    Hyperlipemia     Hypothyroidism     Iron deficiency anemia     Lupus (HCC)     Neuropathy 3/19/2018    Obesity     Osteoarthritis     Tilt table evaluation 03/21/2018    Uncontrolled oriented to person, place, and time. Assessment/Plan      1. Hyperglycemia    - POCT glycosylated hemoglobin (Hb A1C)    2. Essential hypertension  Stable continue med   - verapamil (VERELAN) 360 MG extended release capsule; TAKE 1 CAPSULE BY MOUTH EVERY NIGHT  Dispense: 30 capsule; Refill: 5  - Lipid Panel; Future  - Comprehensive Metabolic Panel; Future    3. Hypothyroidism, unspecified type    - levothyroxine (SYNTHROID) 100 MCG tablet; Take 1 tablet by mouth Daily  Dispense: 30 tablet; Refill: 5  - TSH; Future    4. Mixed hyperlipidemia    - simvastatin (ZOCOR) 20 MG tablet; Take 1 tablet by mouth every evening  Dispense: 30 tablet; Refill: 5    5. Screening for malignant neoplasm of colon    - POCT Fecal Immunochemical Test (FIT); Future    RTO if symptoms worsen or fail to improve  Pt agreeable with plan      Patient given educationalmaterials - see patient instructions. Discussed use, benefit, and side effectsof prescribed medications. All patient questions answered. Pt voiced understanding. Reviewed health maintenance. Instructed to continue current medications, diet andexercise. 1.  Sarah received counseling on the following healthy behaviors: nutrition, exercise and medication adherence  2. Patient given educational materials when available - see patient instructionswhen applicable  3. Discussed use, benefit, and side effects of prescribed medications. Barriersto medication compliance addressed. All patient questions answered. Pt voicedunderstanding. 4.  Reviewed prior labs and health maintenance  5. Continuecurrent medications, diet and exercise.     Completed Refills   Requested Prescriptions     Signed Prescriptions Disp Refills    verapamil (VERELAN) 360 MG extended release capsule 30 capsule 5     Sig: TAKE 1 CAPSULE BY MOUTH EVERY NIGHT    levothyroxine (SYNTHROID) 100 MCG tablet 30 tablet 5     Sig: Take 1 tablet by mouth Daily    simvastatin (ZOCOR) 20 MG tablet 30 tablet 5

## 2020-03-09 RX ORDER — LEVOTHYROXINE SODIUM 100 UG/1
TABLET ORAL
Qty: 30 TABLET | Refills: 0 | OUTPATIENT
Start: 2020-03-09

## 2020-03-27 RX ORDER — METOPROLOL TARTRATE 75 MG/1
TABLET, FILM COATED ORAL
Qty: 180 TABLET | Refills: 1 | Status: SHIPPED | OUTPATIENT
Start: 2020-03-27 | End: 2020-08-07 | Stop reason: SDUPTHER

## 2020-03-27 RX ORDER — LEVOTHYROXINE SODIUM 0.1 MG/1
100 TABLET ORAL DAILY
Qty: 30 TABLET | Refills: 5 | Status: SHIPPED | OUTPATIENT
Start: 2020-03-27 | End: 2020-08-07 | Stop reason: SDUPTHER

## 2020-03-27 NOTE — TELEPHONE ENCOUNTER
Health Maintenance   Topic Date Due    HIV screen  07/14/1983    Cervical cancer screen  06/18/2016    Shingles Vaccine (1 of 2) 07/14/2018    Lipid screen  09/13/2019    TSH testing  09/13/2019    DTaP/Tdap/Td vaccine (1 - Tdap) 06/05/2020 (Originally 7/14/1987)    Flu vaccine (1) 01/15/2021 (Originally 9/1/2019)    Breast cancer screen  07/17/2020    Potassium monitoring  08/14/2020    Creatinine monitoring  08/14/2020    A1C test (Diabetic or Prediabetic)  01/15/2021    Colon Cancer Screen FIT/FOBT  01/15/2021    Hepatitis A vaccine  Aged Out    Hepatitis B vaccine  Aged Out    Hib vaccine  Aged Out    Meningococcal (ACWY) vaccine  Aged Out    Pneumococcal 0-64 years Vaccine  Aged Out             (applicable per patient's age: Cancer Screenings, Depression Screening, Fall Risk Screening, Immunizations)    Hemoglobin A1C (%)   Date Value   01/15/2020 6.0   06/26/2018 6.1   07/14/2015 5.6     LDL Cholesterol (mg/dL)   Date Value   03/09/2017 112     LDL Calculated (mg/dL)   Date Value   09/13/2018 86     AST (U/L)   Date Value   09/13/2018 31     ALT (U/L)   Date Value   09/13/2018 50     BUN (mg/dL)   Date Value   08/14/2019 11      (goal A1C is < 7)   (goal LDL is <100) need 30-50% reduction from baseline     BP Readings from Last 3 Encounters:   01/15/20 122/83   09/25/19 (!) 146/91   08/21/19 (!) 150/95    (goal /80)      All Future Testing planned in [facility]:  Lab Frequency Next Occurrence   GWENDOLYN DIGITAL SCREEN W CAD BILATERAL Once 05/01/2020   TSH Once 03/24/2020   Lipid Panel Once 04/23/2020   Comprehensive Metabolic Panel Once 36/12/5727       Next Visit Date:  Future Appointments   Date Time Provider Steph Vizcarra   7/22/2020  4:00 PM ORVILLE Kaminski - CNP [facility]            Patient Active Problem List:     Anemia     Fatigue     Uterine fibroid     Obesity     Dyspepsia     DUB (dysfunctional uterine bleeding)     Dysmenorrhea     Syncope     Anxiety Stress     Uncontrolled hypertension     Problem with medical care compliance     Lumbar back sprain     Headache     Groin strain     Unstable gait     Sprain, lumbosacral     Constipation by delayed colonic transit     Non morbid obesity due to excess calories     Essential hypertension     Mixed hyperlipidemia     Arthralgia of multiple joints     Hx of fracture of ankle     Hypothyroidism     Systemic lupus erythematosus (HCC)     DJD (degenerative joint disease), lumbosacral     Near syncope     Fibromyalgia     Neuropathy     Osteoarthritis of right hip

## 2020-03-27 NOTE — TELEPHONE ENCOUNTER
Health Maintenance   Topic Date Due    HIV screen  07/14/1983    Cervical cancer screen  06/18/2016    Shingles Vaccine (1 of 2) 07/14/2018    Lipid screen  09/13/2019    TSH testing  09/13/2019    DTaP/Tdap/Td vaccine (1 - Tdap) 06/05/2020 (Originally 7/14/1987)    Flu vaccine (1) 01/15/2021 (Originally 9/1/2019)    Breast cancer screen  07/17/2020    Potassium monitoring  08/14/2020    Creatinine monitoring  08/14/2020    A1C test (Diabetic or Prediabetic)  01/15/2021    Colon Cancer Screen FIT/FOBT  01/15/2021    Hepatitis A vaccine  Aged Out    Hepatitis B vaccine  Aged Out    Hib vaccine  Aged Out    Meningococcal (ACWY) vaccine  Aged Out    Pneumococcal 0-64 years Vaccine  Aged Out             (applicable per patient's age: Cancer Screenings, Depression Screening, Fall Risk Screening, Immunizations)    Hemoglobin A1C (%)   Date Value   01/15/2020 6.0   06/26/2018 6.1   07/14/2015 5.6     LDL Cholesterol (mg/dL)   Date Value   03/09/2017 112     LDL Calculated (mg/dL)   Date Value   09/13/2018 86     AST (U/L)   Date Value   09/13/2018 31     ALT (U/L)   Date Value   09/13/2018 50     BUN (mg/dL)   Date Value   08/14/2019 11      (goal A1C is < 7)   (goal LDL is <100) need 30-50% reduction from baseline     BP Readings from Last 3 Encounters:   01/15/20 122/83   09/25/19 (!) 146/91   08/21/19 (!) 150/95    (goal /80)      All Future Testing planned in [facility]:  Lab Frequency Next Occurrence   GWENDOLYN DIGITAL SCREEN W CAD BILATERAL Once 05/01/2020       Next Visit Date:  Future Appointments   Date Time Provider Steph Vizcarra   7/22/2020  4:00 PM ORVILLE Omalley - CNP [facility]            Patient Active Problem List:     Anemia     Fatigue     Uterine fibroid     Obesity     Dyspepsia     DUB (dysfunctional uterine bleeding)     Dysmenorrhea     Syncope     Anxiety     Stress     Uncontrolled hypertension     Problem with medical care compliance     Lumbar back sprain     Headache     Groin strain     Unstable gait     Sprain, lumbosacral     Constipation by delayed colonic transit     Non morbid obesity due to excess calories     Essential hypertension     Mixed hyperlipidemia     Arthralgia of multiple joints     Hx of fracture of ankle     Hypothyroidism     Systemic lupus erythematosus (HCC)     DJD (degenerative joint disease), lumbosacral     Near syncope     Fibromyalgia     Neuropathy     Osteoarthritis of right hip

## 2020-04-07 RX ORDER — HYDROCHLOROTHIAZIDE 12.5 MG/1
12.5 TABLET ORAL DAILY
Qty: 30 TABLET | Refills: 5 | Status: SHIPPED | OUTPATIENT
Start: 2020-04-07 | End: 2020-08-07 | Stop reason: SDUPTHER

## 2020-04-29 RX ORDER — CYCLOBENZAPRINE HCL 10 MG
TABLET ORAL
Qty: 30 TABLET | Refills: 2 | Status: SHIPPED | OUTPATIENT
Start: 2020-04-29 | End: 2020-07-27

## 2020-04-29 NOTE — TELEPHONE ENCOUNTER
Health Maintenance   Topic Date Due    HIV screen  07/14/1983    Cervical cancer screen  06/18/2016    Shingles Vaccine (1 of 2) 07/14/2018    Lipid screen  09/13/2019    TSH testing  09/13/2019    DTaP/Tdap/Td vaccine (1 - Tdap) 06/05/2020 (Originally 7/14/1987)    Flu vaccine (Season Ended) 01/15/2021 (Originally 9/1/2020)    Breast cancer screen  07/17/2020    Potassium monitoring  08/14/2020    Creatinine monitoring  08/14/2020    A1C test (Diabetic or Prediabetic)  01/15/2021    Colon Cancer Screen FIT/FOBT  01/15/2021    Hepatitis A vaccine  Aged Out    Hepatitis B vaccine  Aged Out    Hib vaccine  Aged Out    Meningococcal (ACWY) vaccine  Aged Out    Pneumococcal 0-64 years Vaccine  Aged Out             (applicable per patient's age: Cancer Screenings, Depression Screening, Fall Risk Screening, Immunizations)    Hemoglobin A1C (%)   Date Value   01/15/2020 6.0   06/26/2018 6.1   07/14/2015 5.6     LDL Cholesterol (mg/dL)   Date Value   03/09/2017 112     LDL Calculated (mg/dL)   Date Value   09/13/2018 86     AST (U/L)   Date Value   09/13/2018 31     ALT (U/L)   Date Value   09/13/2018 50     BUN (mg/dL)   Date Value   08/14/2019 11      (goal A1C is < 7)   (goal LDL is <100) need 30-50% reduction from baseline     BP Readings from Last 3 Encounters:   01/15/20 122/83   09/25/19 (!) 146/91   08/21/19 (!) 150/95    (goal /80)      All Future Testing planned in [facility]:  Lab Frequency Next Occurrence   GWENDOLYN DIGITAL SCREEN W CAD BILATERAL Once 05/01/2020       Next Visit Date:  Future Appointments   Date Time Provider Steph Vizcarra   7/22/2020  4:00 PM ORVILLE Escobar CNP [facility]            Patient Active Problem List:     Anemia     Fatigue     Uterine fibroid     Obesity     Dyspepsia     DUB (dysfunctional uterine bleeding)     Dysmenorrhea     Syncope     Anxiety     Stress     Uncontrolled hypertension     Problem with medical care compliance     Lumbar back sprain     Headache     Groin strain     Unstable gait     Sprain, lumbosacral     Constipation by delayed colonic transit     Non morbid obesity due to excess calories     Essential hypertension     Mixed hyperlipidemia     Arthralgia of multiple joints     Hx of fracture of ankle     Hypothyroidism     Systemic lupus erythematosus (HCC)     DJD (degenerative joint disease), lumbosacral     Near syncope     Fibromyalgia     Neuropathy     Osteoarthritis of right hip

## 2020-06-17 ENCOUNTER — TELEPHONE (OUTPATIENT)
Dept: PHARMACY | Facility: CLINIC | Age: 50
End: 2020-06-17

## 2020-06-29 ENCOUNTER — HOSPITAL ENCOUNTER (OUTPATIENT)
Age: 50
Discharge: HOME | End: 2020-06-29
Payer: COMMERCIAL

## 2020-06-29 LAB
ABSOLUTE EOS #: 0.06 K/UL (ref 0–0.44)
ABSOLUTE IMMATURE GRANULOCYTE: 0.05 K/UL (ref 0–0.3)
ABSOLUTE LYMPH #: 2.77 K/UL (ref 1.1–3.7)
ABSOLUTE MONO #: 0.79 K/UL (ref 0.1–1.2)
ALBUMIN SERPL-MCNC: 4.3 G/DL (ref 3.5–5.2)
ALBUMIN/GLOBULIN RATIO: 1.4 (ref 1–2.5)
ALP BLD-CCNC: 86 U/L (ref 35–104)
ALT SERPL-CCNC: 20 U/L (ref 5–33)
ANION GAP SERPL CALCULATED.3IONS-SCNC: 15 MMOL/L (ref 9–17)
AST SERPL-CCNC: 15 U/L
BASOPHILS # BLD: 1 % (ref 0–2)
BASOPHILS ABSOLUTE: 0.06 K/UL (ref 0–0.2)
BILIRUB SERPL-MCNC: 0.48 MG/DL (ref 0.3–1.2)
BUN BLDV-MCNC: 15 MG/DL (ref 6–20)
BUN/CREAT BLD: NORMAL (ref 9–20)
C-REACTIVE PROTEIN: 3 MG/L (ref 0–5)
CALCIUM SERPL-MCNC: 9.4 MG/DL (ref 8.6–10.4)
CHLORIDE BLD-SCNC: 100 MMOL/L (ref 98–107)
CO2: 26 MMOL/L (ref 20–31)
CREAT SERPL-MCNC: 0.77 MG/DL (ref 0.5–0.9)
DIFFERENTIAL TYPE: ABNORMAL
EOSINOPHILS RELATIVE PERCENT: 1 % (ref 1–4)
GFR AFRICAN AMERICAN: >60 ML/MIN
GFR NON-AFRICAN AMERICAN: >60 ML/MIN
GFR SERPL CREATININE-BSD FRML MDRD: NORMAL ML/MIN/{1.73_M2}
GFR SERPL CREATININE-BSD FRML MDRD: NORMAL ML/MIN/{1.73_M2}
GLUCOSE BLD-MCNC: 91 MG/DL (ref 70–99)
HCT VFR BLD CALC: 38.9 % (ref 36.3–47.1)
HEMOGLOBIN: 12.6 G/DL (ref 11.9–15.1)
IMMATURE GRANULOCYTES: 1 %
LYMPHOCYTES # BLD: 32 % (ref 24–43)
MCH RBC QN AUTO: 27.3 PG (ref 25.2–33.5)
MCHC RBC AUTO-ENTMCNC: 32.4 G/DL (ref 28.4–34.8)
MCV RBC AUTO: 84.4 FL (ref 82.6–102.9)
MONOCYTES # BLD: 9 % (ref 3–12)
NRBC AUTOMATED: 0 PER 100 WBC
PDW BLD-RTO: 14.5 % (ref 11.8–14.4)
PLATELET # BLD: 425 K/UL (ref 138–453)
PLATELET ESTIMATE: ABNORMAL
PMV BLD AUTO: 10.1 FL (ref 8.1–13.5)
POTASSIUM SERPL-SCNC: 3.9 MMOL/L (ref 3.7–5.3)
RBC # BLD: 4.61 M/UL (ref 3.95–5.11)
RBC # BLD: ABNORMAL 10*6/UL
SEDIMENTATION RATE, ERYTHROCYTE: 21 MM (ref 0–30)
SEG NEUTROPHILS: 56 % (ref 36–65)
SEGMENTED NEUTROPHILS ABSOLUTE COUNT: 4.93 K/UL (ref 1.5–8.1)
SODIUM BLD-SCNC: 141 MMOL/L (ref 135–144)
TOTAL PROTEIN: 7.3 G/DL (ref 6.4–8.3)
WBC # BLD: 8.7 K/UL (ref 3.5–11.3)
WBC # BLD: ABNORMAL 10*3/UL

## 2020-06-29 PROCEDURE — 85652 RBC SED RATE AUTOMATED: CPT

## 2020-06-29 PROCEDURE — 85025 COMPLETE CBC W/AUTO DIFF WBC: CPT

## 2020-06-29 PROCEDURE — 86140 C-REACTIVE PROTEIN: CPT

## 2020-06-29 PROCEDURE — 36415 COLL VENOUS BLD VENIPUNCTURE: CPT

## 2020-06-29 PROCEDURE — 80053 COMPREHEN METABOLIC PANEL: CPT

## 2020-07-27 RX ORDER — CYCLOBENZAPRINE HCL 10 MG
TABLET ORAL
Qty: 30 TABLET | Refills: 2 | Status: SHIPPED | OUTPATIENT
Start: 2020-07-27 | End: 2020-10-30

## 2020-08-07 ENCOUNTER — OFFICE VISIT (OUTPATIENT)
Dept: FAMILY MEDICINE CLINIC | Age: 50
End: 2020-08-07
Payer: COMMERCIAL

## 2020-08-07 VITALS
HEIGHT: 62 IN | WEIGHT: 190.2 LBS | SYSTOLIC BLOOD PRESSURE: 118 MMHG | RESPIRATION RATE: 18 BRPM | TEMPERATURE: 98.5 F | HEART RATE: 80 BPM | BODY MASS INDEX: 35 KG/M2 | DIASTOLIC BLOOD PRESSURE: 78 MMHG

## 2020-08-07 PROCEDURE — 99214 OFFICE O/P EST MOD 30 MIN: CPT | Performed by: NURSE PRACTITIONER

## 2020-08-07 RX ORDER — CELECOXIB 200 MG/1
1 CAPSULE ORAL 2 TIMES DAILY
COMMUNITY
Start: 2020-07-24 | End: 2022-04-27

## 2020-08-07 RX ORDER — HYDROCHLOROTHIAZIDE 12.5 MG/1
12.5 TABLET ORAL DAILY
Qty: 90 TABLET | Refills: 1 | Status: SHIPPED | OUTPATIENT
Start: 2020-08-07 | End: 2021-02-03 | Stop reason: SDUPTHER

## 2020-08-07 RX ORDER — METOPROLOL TARTRATE 75 MG/1
TABLET, FILM COATED ORAL
Qty: 180 TABLET | Refills: 1 | Status: SHIPPED | OUTPATIENT
Start: 2020-08-07 | End: 2021-02-03 | Stop reason: SDUPTHER

## 2020-08-07 RX ORDER — MYCOPHENOLATE MOFETIL 500 MG/1
1 TABLET ORAL 2 TIMES DAILY
COMMUNITY
Start: 2020-07-24

## 2020-08-07 RX ORDER — SIMVASTATIN 20 MG
20 TABLET ORAL EVERY EVENING
Qty: 90 TABLET | Refills: 1 | Status: SHIPPED | OUTPATIENT
Start: 2020-08-07 | End: 2021-02-03 | Stop reason: SDUPTHER

## 2020-08-07 RX ORDER — LEVOTHYROXINE SODIUM 0.1 MG/1
100 TABLET ORAL DAILY
Qty: 90 TABLET | Refills: 1 | Status: SHIPPED | OUTPATIENT
Start: 2020-08-07 | End: 2021-02-03 | Stop reason: SDUPTHER

## 2020-08-07 RX ORDER — PREDNISONE 1 MG/1
TABLET ORAL
COMMUNITY
Start: 2020-07-23 | End: 2022-01-26

## 2020-08-07 RX ORDER — VERAPAMIL HYDROCHLORIDE 360 MG/1
CAPSULE, DELAYED RELEASE PELLETS ORAL
Qty: 90 CAPSULE | Refills: 1 | Status: SHIPPED | OUTPATIENT
Start: 2020-08-07 | End: 2021-02-03 | Stop reason: SDUPTHER

## 2020-08-07 ASSESSMENT — ENCOUNTER SYMPTOMS
COUGH: 0
SHORTNESS OF BREATH: 0

## 2020-08-07 NOTE — PROGRESS NOTES
Negative for chest pain, palpitations and leg swelling. Other pertinent ROS in HPI  Objective:     /78 (Site: Left Upper Arm, Position: Sitting, Cuff Size: Large Adult)   Pulse 80   Temp 98.5 °F (36.9 °C) (Temporal)   Resp 18   Ht 5' 1.6\" (1.565 m)   Wt 190 lb 3.2 oz (86.3 kg)   LMP  (LMP Unknown)   BMI 35.24 kg/m²    Physical Exam  Constitutional:       Appearance: She is well-developed. HENT:      Right Ear: External ear normal.      Left Ear: External ear normal.      Nose: Nose normal.   Neck:      Musculoskeletal: Full passive range of motion without pain and normal range of motion. Cardiovascular:      Rate and Rhythm: Normal rate and regular rhythm. Heart sounds: Normal heart sounds, S1 normal and S2 normal.   Pulmonary:      Effort: Pulmonary effort is normal. No respiratory distress. Breath sounds: Normal breath sounds. Musculoskeletal: Normal range of motion. General: No deformity. Skin:     General: Skin is warm and dry. Neurological:      Mental Status: She is alert and oriented to person, place, and time. Assessment/PLAN   1. Mixed hyperlipidemia    - simvastatin (ZOCOR) 20 MG tablet; Take 1 tablet by mouth every evening  Dispense: 90 tablet; Refill: 1    2. Essential hypertension  stable  - verapamil (VERELAN) 360 MG extended release capsule; TAKE 1 CAPSULE BY MOUTH EVERY NIGHT  Dispense: 90 capsule; Refill: 1  - Metoprolol Tartrate 75 MG TABS; TAKE 1 TABLET BY MOUTH TWICE DAILY  Dispense: 180 tablet; Refill: 1  - hydrochlorothiazide (HYDRODIURIL) 12.5 MG tablet; Take 1 tablet by mouth daily  Dispense: 90 tablet; Refill: 1    3. Hypothyroidism, unspecified type    - levothyroxine (SYNTHROID) 100 MCG tablet; Take 1 tablet by mouth Daily  Dispense: 90 tablet; Refill: 1    4. Encounter for screening mammogram for breast cancer    - GWENDOLYN DIGITAL SCREEN W OR WO CAD BILATERAL;  Future      RTO if symptoms worsen or fail to improve  Pt agreeable with plan      Patient given educational materials - see patientinstructions. Discussed use, benefit, and side effects of prescribed medications. All patient questions answered. Pt voiced understanding. Reviewed health maintenance. Instructed to continue current medications, diet andexercise. 1.  Sarah received counseling on the following healthy behaviors: nutrition, exercise and medication adherence  2. Patient given educationalmaterials when available - see patient instructions when applicable  3. Discussed use, benefit, and side effects of prescribed medications. Barriersto medication compliance addressed. All patient questions answered. Pt voiced understanding. 4.  Reviewed prior labs and health maintenance when applicable. 5.  Continue current medications, diet and exercise. CompletedRefills   Requested Prescriptions     Signed Prescriptions Disp Refills    simvastatin (ZOCOR) 20 MG tablet 90 tablet 1     Sig: Take 1 tablet by mouth every evening    verapamil (VERELAN) 360 MG extended release capsule 90 capsule 1     Sig: TAKE 1 CAPSULE BY MOUTH EVERY NIGHT    levothyroxine (SYNTHROID) 100 MCG tablet 90 tablet 1     Sig: Take 1 tablet by mouth Daily    Metoprolol Tartrate 75 MG TABS 180 tablet 1     Sig: TAKE 1 TABLET BY MOUTH TWICE DAILY    hydrochlorothiazide (HYDRODIURIL) 12.5 MG tablet 90 tablet 1     Sig: Take 1 tablet by mouth daily       This note was transcribed using dictation with Dragon services. Efforts were made to correct any errors but some words may be misinterpreted.     Electronically signed by Jacinda Go CNP on 8/7/2020 at 4:39 PM

## 2020-08-07 NOTE — LETTER
[address] [address]  Phone: [phone]  Fax: [phone]    ORVILLE Serrano CNP        August 7, 2020     Patient: Xander Hanson   YOB: 1970   Date of Visit: 8/7/2020       To Whom It May Concern: It is my medical opinion that Celina Chavira may return to full duty immediately with no restrictions. If you have any questions or concerns, please don't hesitate to call.     Sincerely,        ORVILLE Serrano CNP

## 2020-10-30 RX ORDER — CYCLOBENZAPRINE HCL 10 MG
TABLET ORAL
Qty: 30 TABLET | Refills: 2 | Status: SHIPPED | OUTPATIENT
Start: 2020-10-30 | End: 2021-03-16

## 2020-11-03 PROBLEM — I10 ESSENTIAL HYPERTENSION: Status: RESOLVED | Noted: 2017-06-07 | Resolved: 2020-11-03

## 2020-12-02 ENCOUNTER — HOSPITAL ENCOUNTER (OUTPATIENT)
Dept: MAMMOGRAPHY | Age: 50
Discharge: HOME | End: 2020-12-04
Payer: COMMERCIAL

## 2020-12-02 PROCEDURE — 77063 BREAST TOMOSYNTHESIS BI: CPT

## 2021-01-20 ENCOUNTER — TELEPHONE (OUTPATIENT)
Dept: PRIMARY CARE CLINIC | Age: 51
End: 2021-01-20

## 2021-01-20 NOTE — TELEPHONE ENCOUNTER
Spoke with pt to r/s appt for today. Pt has concerns with FMLA paperwork states it's due by 1/24/21. Also stated will she be able to get med refills since her appt has been r/s by the office 2x. Offered her to see another provider in office, she declined. Will rather see you. Please advise.

## 2021-02-01 ENCOUNTER — TELEPHONE (OUTPATIENT)
Dept: PRIMARY CARE CLINIC | Age: 51
End: 2021-02-01

## 2021-02-01 NOTE — TELEPHONE ENCOUNTER
Julio Cesar Yañez sent form with wrong claim number. Have new form with correct claim number that will need your signature.

## 2021-02-03 ENCOUNTER — OFFICE VISIT (OUTPATIENT)
Dept: PRIMARY CARE CLINIC | Age: 51
End: 2021-02-03
Payer: COMMERCIAL

## 2021-02-03 VITALS
TEMPERATURE: 97.7 F | WEIGHT: 193 LBS | OXYGEN SATURATION: 99 % | BODY MASS INDEX: 35.76 KG/M2 | HEART RATE: 60 BPM | DIASTOLIC BLOOD PRESSURE: 80 MMHG | SYSTOLIC BLOOD PRESSURE: 132 MMHG

## 2021-02-03 DIAGNOSIS — E03.9 HYPOTHYROIDISM, UNSPECIFIED TYPE: ICD-10-CM

## 2021-02-03 DIAGNOSIS — I10 ESSENTIAL HYPERTENSION: ICD-10-CM

## 2021-02-03 DIAGNOSIS — E78.2 MIXED HYPERLIPIDEMIA: ICD-10-CM

## 2021-02-03 PROCEDURE — 99213 OFFICE O/P EST LOW 20 MIN: CPT | Performed by: NURSE PRACTITIONER

## 2021-02-03 RX ORDER — HYDROCHLOROTHIAZIDE 12.5 MG/1
12.5 TABLET ORAL DAILY
Qty: 90 TABLET | Refills: 1 | Status: SHIPPED | OUTPATIENT
Start: 2021-02-03 | End: 2021-09-10

## 2021-02-03 RX ORDER — LEVOTHYROXINE SODIUM 112 UG/1
112 TABLET ORAL DAILY
Qty: 30 TABLET | Refills: 2 | Status: SHIPPED | OUTPATIENT
Start: 2021-02-03 | End: 2021-05-17

## 2021-02-03 RX ORDER — METOPROLOL TARTRATE 75 MG/1
TABLET, FILM COATED ORAL
Qty: 180 TABLET | Refills: 1 | Status: SHIPPED | OUTPATIENT
Start: 2021-02-03 | End: 2022-01-05

## 2021-02-03 RX ORDER — SIMVASTATIN 20 MG
20 TABLET ORAL EVERY EVENING
Qty: 90 TABLET | Refills: 1 | Status: SHIPPED | OUTPATIENT
Start: 2021-02-03 | End: 2022-02-07 | Stop reason: SDUPTHER

## 2021-02-03 RX ORDER — VERAPAMIL HYDROCHLORIDE 360 MG/1
CAPSULE, DELAYED RELEASE PELLETS ORAL
Qty: 90 CAPSULE | Refills: 1 | Status: SHIPPED | OUTPATIENT
Start: 2021-02-03 | End: 2022-02-07 | Stop reason: SDUPTHER

## 2021-02-03 ASSESSMENT — PATIENT HEALTH QUESTIONNAIRE - PHQ9
SUM OF ALL RESPONSES TO PHQ QUESTIONS 1-9: 0
SUM OF ALL RESPONSES TO PHQ QUESTIONS 1-9: 0

## 2021-02-03 ASSESSMENT — ENCOUNTER SYMPTOMS
COUGH: 0
SHORTNESS OF BREATH: 0

## 2021-02-03 NOTE — PROGRESS NOTES
Visit Information    Have you changed or started any medications since your last visit including any over-the-counter medicines, vitamins, or herbal medicines? no   Are you having any side effects from any of your medications? -  no  Have you stopped taking any of your medications? Is so, why? -  no    Have you seen any other physician or provider since your last visit? Yes - Records Obtained  Have you had any other diagnostic tests since your last visit? No  Have you been seen in the emergency room and/or had an admission to a hospital since we last saw you? No  Have you had your routine dental cleaning in the past 6 months? no    Have you activated your [employer] account? If not, what are your barriers?  Yes     Patient Care Team:  ORVILLE Henry CNP as PCP - General (Certified Nurse Practitioner)  ORVILLE Henry CNP as PCP - [facility] Provider    Medical History Review  Past Medical, Family, and Social History reviewed and does not contribute to the patient presenting condition    Health Maintenance   Topic Date Due    Hepatitis C screen  1970    HIV screen  07/14/1983    DTaP/Tdap/Td vaccine (1 - Tdap) 07/14/1987    Cervical cancer screen  06/18/2016    Shingles Vaccine (1 of 2) 07/14/2018    Lipid screen  09/13/2019    TSH testing  09/13/2019    Flu vaccine (1) 09/01/2020    A1C test (Diabetic or Prediabetic)  01/15/2021    Colon Cancer Screen FIT/FOBT  01/15/2021    Potassium monitoring  06/29/2021    Creatinine monitoring  06/29/2021    Breast cancer screen  12/02/2022    Hepatitis A vaccine  Aged Out    Hepatitis B vaccine  Aged Out    Hib vaccine  Aged Out    Meningococcal (ACWY) vaccine  Aged Out    Pneumococcal 0-64 years Vaccine  Aged Out

## 2021-02-03 NOTE — PROGRESS NOTES
[facility]  [address], [address] [facility]  [phone]    Ana Bland is a 44 y.o. female who presents today for her  medical conditions/complaintsas noted below. Ana Bland is c/o of Check-Up (Bp)  . HPI:     HPI   She is starting a \"cut back\", eating healthier, walking 3 miles a day. Wt Readings from Last 3 Encounters:   02/03/21 193 lb (87.5 kg)   08/07/20 190 lb 3.2 oz (86.3 kg)   01/15/20 188 lb (85.3 kg)       Hypertension:  Home blood pressure monitoring: No.  She is adherent to a low sodium diet. Patient denies chest pain, shortness of breath, headache, lightheadedness, blurred vision and peripheral edema. Antihypertensive medication side effects: no medication side effects noted. Use of agents associated with hypertension: thyroid hormones. Sodium (mmol/L)   Date Value   06/29/2020 141    BUN (mg/dL)   Date Value   06/29/2020 15    Glucose (mg/dL)   Date Value   06/29/2020 91   05/05/2012 88      Potassium (mmol/L)   Date Value   06/29/2020 3.9    CREATININE (mg/dL)   Date Value   06/29/2020 0.77           Nursing note reviewedand discussed with patient. Patient'smedications, allergies, past medical, surgical, social and family histories werereviewed and updated as appropriate.   Current Outpatient Medications on File Prior to Visit   Medication Sig Dispense Refill    cyclobenzaprine (FLEXERIL) 10 MG tablet TAKE 1 TABLET BY MOUTH EVERY NIGHT AT BEDTIME AS NEEDED FOR MUSCLE SPASMS 30 tablet 2    celecoxib (CELEBREX) 200 MG capsule Take 1 capsule by mouth 2 times daily      mycophenolate (CELLCEPT) 500 MG tablet Take 1 tablet by mouth 2 times daily      predniSONE (DELTASONE) 5 MG tablet TK 6 TS D X 3 DAYS 5 TS D X 3 DAYS 4 TS D X 3 DAYS 3 TS X 3 DAYS 2 TS X 3 DAYS 1 D X 3 DAYS      azaTHIOprine (IMURAN) 50 MG tablet TK 1 T PO D  2 Left Ear: External ear normal.      Nose: Nose normal.   Neck:      Musculoskeletal: Full passive range of motion without pain and normal range of motion. Cardiovascular:      Rate and Rhythm: Normal rate and regular rhythm. Heart sounds: Normal heart sounds, S1 normal and S2 normal.   Pulmonary:      Effort: Pulmonary effort is normal. No respiratory distress. Breath sounds: Normal breath sounds. Musculoskeletal: Normal range of motion. General: No deformity. Skin:     General: Skin is warm and dry. Neurological:      Mental Status: She is alert and oriented to person, place, and time. Assessment/Plan         1. Mixed hyperlipidemia    - simvastatin (ZOCOR) 20 MG tablet; Take 1 tablet by mouth every evening  Dispense: 90 tablet; Refill: 1    2. Essential hypertension    - verapamil (VERELAN) 360 MG extended release capsule; TAKE 1 CAPSULE BY MOUTH EVERY NIGHT  Dispense: 90 capsule; Refill: 1  - Metoprolol Tartrate 75 MG TABS; TAKE 1 TABLET BY MOUTH TWICE DAILY  Dispense: 180 tablet; Refill: 1  - hydroCHLOROthiazide (HYDRODIURIL) 12.5 MG tablet; Take 1 tablet by mouth daily  Dispense: 90 tablet; Refill: 1  - TSH; Future    3. Hypothyroidism, unspecified type  Recheck in 6 weeks. - levothyroxine (SYNTHROID) 112 MCG tablet; Take 1 tablet by mouth Daily  Dispense: 30 tablet; Refill: 2    RTO if symptoms worsen or fail to improve  Pt agreeable with plan      Patient given educationalmaterials - see patient instructions. Discussed use, benefit, and side effectsof prescribed medications. All patient questions answered. Pt voiced understanding. Reviewed health maintenance. Instructed to continue current medications, diet andexercise. 1.  Sarah received counseling on the following healthy behaviors: nutrition, exercise and medication adherence  2.   Patient given educational materials when available - see patient instructionswhen applicable 3.  Discussed use, benefit, and side effects of prescribed medications. Barriersto medication compliance addressed. All patient questions answered. Pt voicedunderstanding. 4.  Reviewed prior labs and health maintenance  5. Continuecurrent medications, diet and exercise. Completed Refills   Requested Prescriptions     Signed Prescriptions Disp Refills    simvastatin (ZOCOR) 20 MG tablet 90 tablet 1     Sig: Take 1 tablet by mouth every evening    verapamil (VERELAN) 360 MG extended release capsule 90 capsule 1     Sig: TAKE 1 CAPSULE BY MOUTH EVERY NIGHT    levothyroxine (SYNTHROID) 112 MCG tablet 30 tablet 2     Sig: Take 1 tablet by mouth Daily    Metoprolol Tartrate 75 MG TABS 180 tablet 1     Sig: TAKE 1 TABLET BY MOUTH TWICE DAILY    hydroCHLOROthiazide (HYDRODIURIL) 12.5 MG tablet 90 tablet 1     Sig: Take 1 tablet by mouth daily         This note was transcribed using dictation with Dragon services. Efforts were made to correct any errors but some words may be misinterpreted.     Electronically signed by Horacio Hoff CNP on 2/3/2021 at 3:33 PM

## 2021-02-04 NOTE — TELEPHONE ENCOUNTER
Patient called requesting per Benito Osgood that the duration line needs to be filled in , so that claim maybe processed, a copy of fmla has been faxed to provider at [facility]. ,  due to disability claim needing to be returned in 7 days from 2/04/21, pls advise

## 2021-03-14 DIAGNOSIS — M62.830 BACK SPASM: ICD-10-CM

## 2021-03-15 NOTE — TELEPHONE ENCOUNTER
Health Maintenance   Topic Date Due    Hepatitis C screen  Never done    HIV screen  Never done    COVID-19 Vaccine (1) Never done    DTaP/Tdap/Td vaccine (1 - Tdap) Never done    Cervical cancer screen  06/18/2016    Shingles Vaccine (1 of 2) Never done    Lipid screen  09/13/2019    TSH testing  09/13/2019    Flu vaccine (1) Never done    A1C test (Diabetic or Prediabetic)  01/15/2021    Colon Cancer Screen FIT/FOBT  01/15/2021    Potassium monitoring  06/29/2021    Creatinine monitoring  06/29/2021    Breast cancer screen  12/02/2022    Hepatitis A vaccine  Aged Out    Hepatitis B vaccine  Aged Out    Hib vaccine  Aged Out    Meningococcal (ACWY) vaccine  Aged Out    Pneumococcal 0-64 years Vaccine  Aged Out             (applicable per patient's age: Cancer Screenings, Depression Screening, Fall Risk Screening, Immunizations)    Hemoglobin A1C (%)   Date Value   01/15/2020 6.0   06/26/2018 6.1   07/14/2015 5.6     LDL Cholesterol (mg/dL)   Date Value   03/09/2017 112     LDL Calculated (mg/dL)   Date Value   09/13/2018 86     AST (U/L)   Date Value   06/29/2020 15     ALT (U/L)   Date Value   06/29/2020 20     BUN (mg/dL)   Date Value   06/29/2020 15      (goal A1C is < 7)   (goal LDL is <100) need 30-50% reduction from baseline     BP Readings from Last 3 Encounters:   02/03/21 132/80   08/07/20 118/78   01/15/20 122/83    (goal /80)      All Future Testing planned in [facility]:  Lab Frequency Next Occurrence   TSH Once 03/17/2021       Next Visit Date:  Future Appointments   Date Time Provider Steph Vizcarra   8/4/2021  4:00 PM ORVILLE Carranza - CNP [facility]            Patient Active Problem List:     Anemia     Fatigue     Uterine fibroid     Obesity     Dyspepsia     DUB (dysfunctional uterine bleeding)     Dysmenorrhea     Anxiety     Stress     Uncontrolled hypertension     Problem with medical care compliance     Lumbar back sprain     Headache     Groin strain     Unstable gait     Sprain, lumbosacral     Constipation by delayed colonic transit     Non morbid obesity due to excess calories     Mixed hyperlipidemia     Arthralgia of multiple joints     Hx of fracture of ankle     Hypothyroidism     Systemic lupus erythematosus (HCC)     DJD (degenerative joint disease), lumbosacral     Near syncope     Fibromyalgia     Neuropathy     Osteoarthritis of right hip

## 2021-03-16 RX ORDER — CYCLOBENZAPRINE HCL 10 MG
TABLET ORAL
Qty: 30 TABLET | Refills: 2 | Status: SHIPPED | OUTPATIENT
Start: 2021-03-16 | End: 2021-06-24

## 2021-04-24 ENCOUNTER — HOSPITAL ENCOUNTER (OUTPATIENT)
Age: 51
Discharge: HOME | End: 2021-04-24
Payer: COMMERCIAL

## 2021-04-24 DIAGNOSIS — I10 ESSENTIAL HYPERTENSION: ICD-10-CM

## 2021-04-24 LAB
ABSOLUTE EOS #: 0.1 K/UL (ref 0–0.44)
ABSOLUTE IMMATURE GRANULOCYTE: 0.03 K/UL (ref 0–0.3)
ABSOLUTE LYMPH #: 1.52 K/UL (ref 1.1–3.7)
ABSOLUTE MONO #: 0.56 K/UL (ref 0.1–1.2)
ALBUMIN SERPL-MCNC: 4.6 G/DL (ref 3.5–5.2)
ALBUMIN/GLOBULIN RATIO: 1.4 (ref 1–2.5)
ALP BLD-CCNC: 80 U/L (ref 35–104)
ALT SERPL-CCNC: 12 U/L (ref 5–33)
ANION GAP SERPL CALCULATED.3IONS-SCNC: 14 MMOL/L (ref 9–17)
AST SERPL-CCNC: 14 U/L
BASOPHILS # BLD: 1 % (ref 0–2)
BASOPHILS ABSOLUTE: 0.06 K/UL (ref 0–0.2)
BILIRUB SERPL-MCNC: 0.46 MG/DL (ref 0.3–1.2)
BUN BLDV-MCNC: 23 MG/DL (ref 6–20)
BUN/CREAT BLD: ABNORMAL (ref 9–20)
C-REACTIVE PROTEIN: 3.9 MG/L (ref 0–5)
CALCIUM SERPL-MCNC: 10 MG/DL (ref 8.6–10.4)
CHLORIDE BLD-SCNC: 104 MMOL/L (ref 98–107)
CO2: 22 MMOL/L (ref 20–31)
COMPLEMENT C3: 145 MG/DL (ref 90–180)
COMPLEMENT C4: 46 MG/DL (ref 10–40)
CREAT SERPL-MCNC: 0.92 MG/DL (ref 0.5–0.9)
DIFFERENTIAL TYPE: ABNORMAL
EOSINOPHILS RELATIVE PERCENT: 2 % (ref 1–4)
GFR AFRICAN AMERICAN: >60 ML/MIN
GFR NON-AFRICAN AMERICAN: >60 ML/MIN
GFR SERPL CREATININE-BSD FRML MDRD: ABNORMAL ML/MIN/{1.73_M2}
GFR SERPL CREATININE-BSD FRML MDRD: ABNORMAL ML/MIN/{1.73_M2}
GLUCOSE BLD-MCNC: 102 MG/DL (ref 70–99)
HCT VFR BLD CALC: 33.6 % (ref 36.3–47.1)
HEMOGLOBIN: 10.9 G/DL (ref 11.9–15.1)
IMMATURE GRANULOCYTES: 1 %
LYMPHOCYTES # BLD: 27 % (ref 24–43)
MCH RBC QN AUTO: 27.1 PG (ref 25.2–33.5)
MCHC RBC AUTO-ENTMCNC: 32.4 G/DL (ref 28.4–34.8)
MCV RBC AUTO: 83.6 FL (ref 82.6–102.9)
MONOCYTES # BLD: 10 % (ref 3–12)
NRBC AUTOMATED: 0 PER 100 WBC
PDW BLD-RTO: 13.9 % (ref 11.8–14.4)
PLATELET # BLD: 499 K/UL (ref 138–453)
PLATELET ESTIMATE: ABNORMAL
PMV BLD AUTO: 10.8 FL (ref 8.1–13.5)
POTASSIUM SERPL-SCNC: 4 MMOL/L (ref 3.7–5.3)
RBC # BLD: 4.02 M/UL (ref 3.95–5.11)
RBC # BLD: ABNORMAL 10*6/UL
SEDIMENTATION RATE, ERYTHROCYTE: 19 MM (ref 0–30)
SEG NEUTROPHILS: 59 % (ref 36–65)
SEGMENTED NEUTROPHILS ABSOLUTE COUNT: 3.39 K/UL (ref 1.5–8.1)
SODIUM BLD-SCNC: 140 MMOL/L (ref 135–144)
TOTAL PROTEIN: 7.8 G/DL (ref 6.4–8.3)
TSH SERPL DL<=0.05 MIU/L-ACNC: 0.37 MIU/L (ref 0.3–5)
WBC # BLD: 5.7 K/UL (ref 3.5–11.3)
WBC # BLD: ABNORMAL 10*3/UL

## 2021-04-24 PROCEDURE — 86160 COMPLEMENT ANTIGEN: CPT

## 2021-04-24 PROCEDURE — 84443 ASSAY THYROID STIM HORMONE: CPT

## 2021-04-24 PROCEDURE — 85025 COMPLETE CBC W/AUTO DIFF WBC: CPT

## 2021-04-24 PROCEDURE — 36415 COLL VENOUS BLD VENIPUNCTURE: CPT

## 2021-04-24 PROCEDURE — 80053 COMPREHEN METABOLIC PANEL: CPT

## 2021-04-24 PROCEDURE — 86140 C-REACTIVE PROTEIN: CPT

## 2021-04-24 PROCEDURE — 85652 RBC SED RATE AUTOMATED: CPT

## 2021-06-24 DIAGNOSIS — M62.830 BACK SPASM: ICD-10-CM

## 2021-06-24 RX ORDER — CYCLOBENZAPRINE HCL 10 MG
TABLET ORAL
Qty: 30 TABLET | Refills: 2 | Status: SHIPPED | OUTPATIENT
Start: 2021-06-24 | End: 2021-09-28 | Stop reason: SDUPTHER

## 2021-07-09 ENCOUNTER — TELEPHONE (OUTPATIENT)
Dept: PRIMARY CARE CLINIC | Age: 51
End: 2021-07-09

## 2021-07-09 NOTE — TELEPHONE ENCOUNTER
----- Message from Regino Gomez sent at 7/9/2021  9:47 AM EDT -----  Subject: Message to Provider    QUESTIONS  Information for Provider? Patient is calling because she stated her FMLA   paperwork is going to be refax due to some things need to be corrected. Patient stated that the durations need to be more specific and the reason   for the FMLA needs to be stated as well. Please advise  ---------------------------------------------------------------------------  --------------  CALL BACK INFO  What is the best way for the office to contact you? OK to leave message on   voicemail  Preferred Call Back Phone Number? [phone]  ---------------------------------------------------------------------------  --------------  SCRIPT ANSWERS  Relationship to Patient?  Self

## 2021-07-12 ENCOUNTER — TELEPHONE (OUTPATIENT)
Dept: PRIMARY CARE CLINIC | Age: 51
End: 2021-07-12

## 2021-07-12 NOTE — TELEPHONE ENCOUNTER
----- Message from Lizett Erickson sent at 7/9/2021  9:47 AM EDT -----  Subject: Message to Provider    QUESTIONS  Information for Provider? Patient is calling because she stated her FMLA   paperwork is going to be refax due to some things need to be corrected. Patient stated that the durations need to be more specific and the reason   for the FMLA needs to be stated as well. Please advise  ---------------------------------------------------------------------------  --------------  CALL BACK INFO  What is the best way for the office to contact you? OK to leave message on   voicemail  Preferred Call Back Phone Number? [phone]  ---------------------------------------------------------------------------  --------------  SCRIPT ANSWERS  Relationship to Patient?  Self

## 2021-07-13 ENCOUNTER — HOSPITAL ENCOUNTER (OUTPATIENT)
Age: 51
Discharge: HOME | End: 2021-07-13
Payer: COMMERCIAL

## 2021-07-13 LAB
ABSOLUTE EOS #: 0.13 K/UL (ref 0–0.44)
ABSOLUTE IMMATURE GRANULOCYTE: 0.03 K/UL (ref 0–0.3)
ABSOLUTE LYMPH #: 1.93 K/UL (ref 1.1–3.7)
ABSOLUTE MONO #: 0.57 K/UL (ref 0.1–1.2)
ALBUMIN SERPL-MCNC: 4.5 G/DL (ref 3.5–5.2)
ALBUMIN/GLOBULIN RATIO: 1.4 (ref 1–2.5)
ALP BLD-CCNC: 92 U/L (ref 35–104)
ALT SERPL-CCNC: 13 U/L (ref 5–33)
ANION GAP SERPL CALCULATED.3IONS-SCNC: 16 MMOL/L (ref 9–17)
AST SERPL-CCNC: 22 U/L
BASOPHILS # BLD: 1 % (ref 0–2)
BASOPHILS ABSOLUTE: 0.06 K/UL (ref 0–0.2)
BILIRUB SERPL-MCNC: 0.28 MG/DL (ref 0.3–1.2)
BUN BLDV-MCNC: 24 MG/DL (ref 6–20)
BUN/CREAT BLD: ABNORMAL (ref 9–20)
C-REACTIVE PROTEIN: 5.2 MG/L (ref 0–5)
CALCIUM SERPL-MCNC: 10 MG/DL (ref 8.6–10.4)
CHLORIDE BLD-SCNC: 102 MMOL/L (ref 98–107)
CO2: 21 MMOL/L (ref 20–31)
COMPLEMENT C3: 164 MG/DL (ref 90–180)
COMPLEMENT C4: 55 MG/DL (ref 10–40)
CREAT SERPL-MCNC: 0.85 MG/DL (ref 0.5–0.9)
DIFFERENTIAL TYPE: ABNORMAL
EOSINOPHILS RELATIVE PERCENT: 2 % (ref 1–4)
FOLATE: 17.8 NG/ML
GFR AFRICAN AMERICAN: >60 ML/MIN
GFR NON-AFRICAN AMERICAN: >60 ML/MIN
GFR SERPL CREATININE-BSD FRML MDRD: ABNORMAL ML/MIN/{1.73_M2}
GFR SERPL CREATININE-BSD FRML MDRD: ABNORMAL ML/MIN/{1.73_M2}
GLUCOSE BLD-MCNC: 111 MG/DL (ref 70–99)
HCT VFR BLD CALC: 35.2 % (ref 36.3–47.1)
HEMOGLOBIN: 11.3 G/DL (ref 11.9–15.1)
IMMATURE GRANULOCYTES: 1 %
LYMPHOCYTES # BLD: 32 % (ref 24–43)
MCH RBC QN AUTO: 26.5 PG (ref 25.2–33.5)
MCHC RBC AUTO-ENTMCNC: 32.1 G/DL (ref 28.4–34.8)
MCV RBC AUTO: 82.4 FL (ref 82.6–102.9)
MONOCYTES # BLD: 10 % (ref 3–12)
NRBC AUTOMATED: 0 PER 100 WBC
PDW BLD-RTO: 13.7 % (ref 11.8–14.4)
PLATELET # BLD: 396 K/UL (ref 138–453)
PLATELET ESTIMATE: ABNORMAL
PMV BLD AUTO: 10.6 FL (ref 8.1–13.5)
POTASSIUM SERPL-SCNC: 4.2 MMOL/L (ref 3.7–5.3)
RBC # BLD: 4.27 M/UL (ref 3.95–5.11)
RBC # BLD: ABNORMAL 10*6/UL
SEDIMENTATION RATE, ERYTHROCYTE: 53 MM (ref 0–30)
SEG NEUTROPHILS: 54 % (ref 36–65)
SEGMENTED NEUTROPHILS ABSOLUTE COUNT: 3.23 K/UL (ref 1.5–8.1)
SODIUM BLD-SCNC: 139 MMOL/L (ref 135–144)
TOTAL PROTEIN: 7.8 G/DL (ref 6.4–8.3)
VITAMIN B-12: 1257 PG/ML (ref 232–1245)
VITAMIN D 25-HYDROXY: 39.2 NG/ML (ref 30–100)
WBC # BLD: 6 K/UL (ref 3.5–11.3)
WBC # BLD: ABNORMAL 10*3/UL

## 2021-07-13 PROCEDURE — 80053 COMPREHEN METABOLIC PANEL: CPT

## 2021-07-13 PROCEDURE — 36415 COLL VENOUS BLD VENIPUNCTURE: CPT

## 2021-07-13 PROCEDURE — 82607 VITAMIN B-12: CPT

## 2021-07-13 PROCEDURE — 82746 ASSAY OF FOLIC ACID SERUM: CPT

## 2021-07-13 PROCEDURE — 85652 RBC SED RATE AUTOMATED: CPT

## 2021-07-13 PROCEDURE — 86140 C-REACTIVE PROTEIN: CPT

## 2021-07-13 PROCEDURE — 82306 VITAMIN D 25 HYDROXY: CPT

## 2021-07-13 PROCEDURE — 85025 COMPLETE CBC W/AUTO DIFF WBC: CPT

## 2021-07-13 PROCEDURE — 86160 COMPLEMENT ANTIGEN: CPT

## 2021-08-04 ENCOUNTER — OFFICE VISIT (OUTPATIENT)
Dept: PRIMARY CARE CLINIC | Age: 51
End: 2021-08-04
Payer: COMMERCIAL

## 2021-08-04 VITALS
SYSTOLIC BLOOD PRESSURE: 123 MMHG | HEART RATE: 74 BPM | DIASTOLIC BLOOD PRESSURE: 89 MMHG | TEMPERATURE: 97.6 F | WEIGHT: 186.4 LBS | OXYGEN SATURATION: 97 % | BODY MASS INDEX: 34.54 KG/M2

## 2021-08-04 DIAGNOSIS — Z12.11 SCREENING FOR COLON CANCER: ICD-10-CM

## 2021-08-04 DIAGNOSIS — Z13.220 SCREENING FOR HYPERLIPIDEMIA: ICD-10-CM

## 2021-08-04 DIAGNOSIS — I10 ESSENTIAL HYPERTENSION: Primary | ICD-10-CM

## 2021-08-04 DIAGNOSIS — Z13.1 SCREENING FOR DIABETES MELLITUS: ICD-10-CM

## 2021-08-04 DIAGNOSIS — R73.03 PRE-DIABETES: ICD-10-CM

## 2021-08-04 DIAGNOSIS — E78.2 MIXED HYPERLIPIDEMIA: ICD-10-CM

## 2021-08-04 LAB — HBA1C MFR BLD: 6.5 %

## 2021-08-04 PROCEDURE — 83036 HEMOGLOBIN GLYCOSYLATED A1C: CPT | Performed by: NURSE PRACTITIONER

## 2021-08-04 PROCEDURE — 99214 OFFICE O/P EST MOD 30 MIN: CPT | Performed by: NURSE PRACTITIONER

## 2021-08-04 SDOH — ECONOMIC STABILITY: FOOD INSECURITY: WITHIN THE PAST 12 MONTHS, THE FOOD YOU BOUGHT JUST DIDN'T LAST AND YOU DIDN'T HAVE MONEY TO GET MORE.: NEVER TRUE

## 2021-08-04 SDOH — ECONOMIC STABILITY: FOOD INSECURITY: WITHIN THE PAST 12 MONTHS, YOU WORRIED THAT YOUR FOOD WOULD RUN OUT BEFORE YOU GOT MONEY TO BUY MORE.: NEVER TRUE

## 2021-08-04 ASSESSMENT — SOCIAL DETERMINANTS OF HEALTH (SDOH): HOW HARD IS IT FOR YOU TO PAY FOR THE VERY BASICS LIKE FOOD, HOUSING, MEDICAL CARE, AND HEATING?: VERY HARD

## 2021-08-04 ASSESSMENT — ENCOUNTER SYMPTOMS
SHORTNESS OF BREATH: 0
COUGH: 0

## 2021-08-04 NOTE — PROGRESS NOTES
Visit Information    Have you changed or started any medications since your last visit including any over-the-counter medicines, vitamins, or herbal medicines? no   Are you having any side effects from any of your medications? -  no  Have you stopped taking any of your medications? Is so, why? -  no    Have you seen any other physician or provider since your last visit? No  Have you had any other diagnostic tests since your last visit? No  Have you been seen in the emergency room and/or had an admission to a hospital since we last saw you? No  Have you had your routine dental cleaning in the past 6 months? no    Have you activated your [employer] account? If not, what are your barriers?  Yes     Patient Care Team:  ORVILLE Kemp CNP as PCP - General (Certified Nurse Practitioner)  ORVILLE Kemp CNP as PCP - [facility] Provider    Medical History Review  Past Medical, Family, and Social History reviewed and does contribute to the patient presenting condition    Health Maintenance   Topic Date Due    Hepatitis C screen  Never done    Pneumococcal 0-64 years Vaccine (1 of 4 - PCV13) Never done    COVID-19 Vaccine (1) Never done    HIV screen  Never done    DTaP/Tdap/Td vaccine (1 - Tdap) Never done    Cervical cancer screen  06/18/2016    Shingles Vaccine (1 of 2) Never done    Lipid screen  09/13/2019    A1C test (Diabetic or Prediabetic)  01/15/2021    Colon Cancer Screen FIT/FOBT  01/15/2021    Flu vaccine (1) 09/01/2021    TSH testing  04/24/2022    Potassium monitoring  07/13/2022    Creatinine monitoring  07/13/2022    Breast cancer screen  12/02/2022    Hepatitis A vaccine  Aged Out    Hepatitis B vaccine  Aged Out    Hib vaccine  Aged Out    Meningococcal (ACWY) vaccine  Aged Out

## 2021-08-04 NOTE — PROGRESS NOTES
(HYDRODIURIL) 12.5 MG tablet Take 1 tablet by mouth daily 90 tablet 1    celecoxib (CELEBREX) 200 MG capsule Take 1 capsule by mouth 2 times daily      mycophenolate (CELLCEPT) 500 MG tablet Take 1 tablet by mouth 2 times daily      predniSONE (DELTASONE) 5 MG tablet TK 6 TS D X 3 DAYS 5 TS D X 3 DAYS 4 TS D X 3 DAYS 3 TS X 3 DAYS 2 TS X 3 DAYS 1 D X 3 DAYS      azaTHIOprine (IMURAN) 50 MG tablet TK 1 T PO D  2    HYDROcodone-acetaminophen (NORCO) 5-325 MG per tablet Take 1 tablet by mouth 2 times daily as needed for Pain . 20 tablet 0     No current facility-administered medications on file prior to visit. Past Medical History:   Diagnosis Date    Anxiety     DUB (dysfunctional uterine bleeding)     Fibromyalgia 3/19/2018    Headache 2/11/2014    Hyperlipemia     Hypothyroidism     Iron deficiency anemia     Lupus ([address]     Neuropathy 3/19/2018    Obesity     Osteoarthritis     Tilt table evaluation 03/21/2018    Uncontrolled hypertension 8/18/2013      Past Surgical History:   Procedure Laterality Date    ENDOMETRIAL ABLATION  9/17/2010    D & C, Hysterscopy with NovaSure device    HYSTERECTOMY  2/11/2011    Da Cornelia Hysterectomy     Family History   Problem Relation Age of Onset    High Blood Pressure Mother     High Blood Pressure Father     Diabetes Brother     High Blood Pressure Brother     Breast Cancer Maternal Aunt     High Blood Pressure Maternal Grandmother     High Blood Pressure Maternal Grandfather     High Blood Pressure Paternal Grandmother     Breast Cancer Paternal Grandmother     High Blood Pressure Paternal Grandfather     High Blood Pressure Son      Social History     Tobacco Use    Smoking status: Never Smoker    Smokeless tobacco: Never Used   Substance Use Topics    Alcohol use: No      No Known Allergies    Subjective:     Review of Systems   Constitutional: Negative for chills and fever. Respiratory: Negative for cough and shortness of breath. Cardiovascular: Negative for chest pain, palpitations and leg swelling. Objective:     /89 (Site: Left Upper Arm, Position: Sitting)   Pulse 74   Temp 97.6 °F (36.4 °C)   Wt 186 lb 6.4 oz (84.6 kg)   LMP  (LMP Unknown)   SpO2 97%   BMI 34.54 kg/m²    Physical Exam  Constitutional:       Appearance: She is well-developed. HENT:      Right Ear: External ear normal.      Left Ear: External ear normal.      Nose: Nose normal.   Cardiovascular:      Rate and Rhythm: Normal rate and regular rhythm. Heart sounds: Normal heart sounds, S1 normal and S2 normal.   Pulmonary:      Effort: Pulmonary effort is normal. No respiratory distress. Breath sounds: Normal breath sounds. Musculoskeletal:         General: No deformity. Normal range of motion. Cervical back: Full passive range of motion without pain and normal range of motion. Skin:     General: Skin is warm and dry. Neurological:      Mental Status: She is alert and oriented to person, place, and time. Assessment/Plan         1. Essential hypertension  Stable. 2. Screening for colon cancer    - POCT FECAL IMMUNOCHEMICAL TEST (FIT); Future    3. Screening for hyperlipidemia    - Lipid, Fasting; Future    4. Mixed hyperlipidemia      5. Screening for diabetes mellitus    - POCT glycosylated hemoglobin (Hb A1C)    6. Pre-diabetes  Diet and exercise discussed  Pt states she definitely has changes she wishes to implement before going on meds. - POCT glycosylated hemoglobin (Hb A1C)    RTO if symptoms worsen or fail to improve  Pt agreeable with plan      Patient given educationalmaterials - see patient instructions. Discussed use, benefit, and side effectsof prescribed medications. All patient questions answered. Pt voiced understanding. Reviewed health maintenance. Instructed to continue current medications, diet andexercise.     1.  Sarah received counseling on the following healthy behaviors: nutrition, exercise and medication adherence  2. Patient given educational materials when available - see patient instructionswhen applicable  3. Discussed use, benefit, and side effects of prescribed medications. Barriersto medication compliance addressed. All patient questions answered. Pt voicedunderstanding. 4.  Reviewed prior labs and health maintenance  5. Continuecurrent medications, diet and exercise. Completed Refills   Requested Prescriptions      No prescriptions requested or ordered in this encounter         This note was transcribed using dictation with Dragon services. Efforts were made to correct any errors but some words may be misinterpreted.     Electronically signed by ORVILLE Perez CNP, CNP on 8/20/2021 at 8:36 AM

## 2021-09-09 DIAGNOSIS — I10 ESSENTIAL HYPERTENSION: ICD-10-CM

## 2021-09-09 NOTE — TELEPHONE ENCOUNTER
Next Visit Date:  11/10/2021    Hemoglobin A1C (%)   Date Value   08/04/2021 6.5   01/15/2020 6.0   06/26/2018 6.1             ( goal A1C is < 7)   No results found for: LABMICR  LDL Cholesterol (mg/dL)   Date Value   03/09/2017 112     LDL Calculated (mg/dL)   Date Value   09/13/2018 86       (goal LDL is <100)   AST (U/L)   Date Value   07/13/2021 22     ALT (U/L)   Date Value   07/13/2021 13     BUN (mg/dL)   Date Value   07/13/2021 24 (H)     BP Readings from Last 3 Encounters:   08/04/21 123/89   02/03/21 132/80   08/07/20 118/78          (goal 120/80)        Patient Active Problem List:     Anemia     Fatigue     Uterine fibroid     Obesity     Dyspepsia     DUB (dysfunctional uterine bleeding)     Dysmenorrhea     Anxiety     Stress     Uncontrolled hypertension     Problem with medical care compliance     Lumbar back sprain     Headache     Groin strain     Unstable gait     Sprain, lumbosacral     Constipation by delayed colonic transit     Non morbid obesity due to excess calories     Mixed hyperlipidemia     Arthralgia of multiple joints     Hx of fracture of ankle     Hypothyroidism     Systemic lupus erythematosus (HCC)     DJD (degenerative joint disease), lumbosacral     Near syncope     Fibromyalgia     Neuropathy     Osteoarthritis of right hip      ----Doron Campos

## 2021-09-10 RX ORDER — HYDROCHLOROTHIAZIDE 12.5 MG/1
12.5 TABLET ORAL DAILY
Qty: 90 TABLET | Refills: 1 | Status: SHIPPED | OUTPATIENT
Start: 2021-09-10 | End: 2022-03-14

## 2021-09-28 DIAGNOSIS — M62.830 BACK SPASM: ICD-10-CM

## 2021-09-28 RX ORDER — CYCLOBENZAPRINE HCL 10 MG
TABLET ORAL
Qty: 30 TABLET | Refills: 2 | Status: SHIPPED | OUTPATIENT
Start: 2021-09-28 | End: 2022-01-23

## 2022-01-04 DIAGNOSIS — I10 ESSENTIAL HYPERTENSION: ICD-10-CM

## 2022-01-04 NOTE — TELEPHONE ENCOUNTER
Health Maintenance   Topic Date Due    Hepatitis C screen  Never done    Pneumococcal 0-64 years Vaccine (1 of 4 - PCV13) Never done    COVID-19 Vaccine (1) Never done    HIV screen  Never done    DTaP/Tdap/Td vaccine (1 - Tdap) Never done    Shingles Vaccine (1 of 2) Never done    Lipid screen  09/13/2019    Colon Cancer Screen FIT/FOBT  01/15/2021    Flu vaccine (1) Never done    A1C test (Diabetic or Prediabetic)  11/04/2021    Depression Screen  02/03/2022    TSH testing  04/24/2022    Potassium monitoring  07/13/2022    Creatinine monitoring  07/13/2022    Breast cancer screen  12/02/2022    Hepatitis A vaccine  Aged Out    Hepatitis B vaccine  Aged Out    Hib vaccine  Aged Out    Meningococcal (ACWY) vaccine  Aged Out             (applicable per patient's age: Cancer Screenings, Depression Screening, Fall Risk Screening, Immunizations)    Hemoglobin A1C (%)   Date Value   08/04/2021 6.5   01/15/2020 6.0   06/26/2018 6.1     LDL Cholesterol (mg/dL)   Date Value   03/09/2017 112     LDL Calculated (mg/dL)   Date Value   09/13/2018 86     AST (U/L)   Date Value   07/13/2021 22     ALT (U/L)   Date Value   07/13/2021 13     BUN (mg/dL)   Date Value   07/13/2021 24 (H)      (goal A1C is < 7)   (goal LDL is <100) need 30-50% reduction from baseline     BP Readings from Last 3 Encounters:   08/04/21 123/89   02/03/21 132/80   08/07/20 118/78    (goal /80)      All Future Testing planned in [facility]:  Lab Frequency Next Occurrence   POCT FECAL IMMUNOCHEMICAL TEST (FIT) Once 12/07/2021   Lipid, Fasting Once 12/13/2021       Next Visit Date:  Future Appointments   Date Time Provider Steph Vizcarra   1/26/2022  3:15 PM ORVILLE Abdullahi CNP [facility] [address] [facility]            Patient Active Problem List:     Anemia     Fatigue     Uterine fibroid     Obesity     Dyspepsia     DUB (dysfunctional uterine bleeding)     Dysmenorrhea     Anxiety     Stress     Uncontrolled hypertension Problem with medical care compliance     Lumbar back sprain     Headache     Groin strain     Unstable gait     Sprain, lumbosacral     Constipation by delayed colonic transit     Non morbid obesity due to excess calories     Mixed hyperlipidemia     Arthralgia of multiple joints     Hx of fracture of ankle     Hypothyroidism     Systemic lupus erythematosus (HCC)     DJD (degenerative joint disease), lumbosacral     Near syncope     Fibromyalgia     Neuropathy     Osteoarthritis of right hip

## 2022-01-05 RX ORDER — METOPROLOL TARTRATE 75 MG/1
TABLET, FILM COATED ORAL
Qty: 180 TABLET | Refills: 1 | Status: SHIPPED | OUTPATIENT
Start: 2022-01-05 | End: 2022-08-04

## 2022-01-22 DIAGNOSIS — M62.830 BACK SPASM: ICD-10-CM

## 2022-01-23 NOTE — TELEPHONE ENCOUNTER
Health Maintenance   Topic Date Due    Hepatitis C screen  Never done    Pneumococcal 0-64 years Vaccine (1 of 4 - PCV13) Never done    COVID-19 Vaccine (1) Never done    HIV screen  Never done    DTaP/Tdap/Td vaccine (1 - Tdap) Never done    Shingles Vaccine (1 of 2) Never done    Lipid screen  09/13/2019    Colon Cancer Screen FIT/FOBT  01/15/2021    Flu vaccine (1) Never done    A1C test (Diabetic or Prediabetic)  11/04/2021    Depression Screen  02/03/2022    TSH testing  04/24/2022    Potassium monitoring  07/13/2022    Creatinine monitoring  07/13/2022    Breast cancer screen  12/02/2022    Hepatitis A vaccine  Aged Out    Hepatitis B vaccine  Aged Out    Hib vaccine  Aged Out    Meningococcal (ACWY) vaccine  Aged Out             (applicable per patient's age: Cancer Screenings, Depression Screening, Fall Risk Screening, Immunizations)    Hemoglobin A1C (%)   Date Value   08/04/2021 6.5   01/15/2020 6.0   06/26/2018 6.1     LDL Cholesterol (mg/dL)   Date Value   03/09/2017 112     LDL Calculated (mg/dL)   Date Value   09/13/2018 86     AST (U/L)   Date Value   07/13/2021 22     ALT (U/L)   Date Value   07/13/2021 13     BUN (mg/dL)   Date Value   07/13/2021 24 (H)      (goal A1C is < 7)   (goal LDL is <100) need 30-50% reduction from baseline     BP Readings from Last 3 Encounters:   08/04/21 123/89   02/03/21 132/80   08/07/20 118/78    (goal /80)      All Future Testing planned in [facility]:  Lab Frequency Next Occurrence   POCT FECAL IMMUNOCHEMICAL TEST (FIT) Once 12/07/2021   Lipid, Fasting Once 12/13/2021       Next Visit Date:  Future Appointments   Date Time Provider Steph Vizcarra   1/26/2022  3:15 PM ORVILLE Snow - CNP [facility] [address] [facility]            Patient Active Problem List:     Anemia     Fatigue     Uterine fibroid     Obesity     Dyspepsia     DUB (dysfunctional uterine bleeding)     Dysmenorrhea     Anxiety     Stress     Uncontrolled hypertension Problem with medical care compliance     Lumbar back sprain     Headache     Groin strain     Unstable gait     Sprain, lumbosacral     Constipation by delayed colonic transit     Non morbid obesity due to excess calories     Mixed hyperlipidemia     Arthralgia of multiple joints     Hx of fracture of ankle     Hypothyroidism     Systemic lupus erythematosus (HCC)     DJD (degenerative joint disease), lumbosacral     Near syncope     Fibromyalgia     Neuropathy     Osteoarthritis of right hip

## 2022-01-24 RX ORDER — CYCLOBENZAPRINE HCL 10 MG
TABLET ORAL
Qty: 30 TABLET | Refills: 0 | Status: SHIPPED | OUTPATIENT
Start: 2022-01-24 | End: 2022-02-21

## 2022-01-26 ENCOUNTER — OFFICE VISIT (OUTPATIENT)
Dept: PRIMARY CARE CLINIC | Age: 52
End: 2022-01-26
Payer: COMMERCIAL

## 2022-01-26 VITALS
TEMPERATURE: 97.2 F | WEIGHT: 183.4 LBS | BODY MASS INDEX: 33.75 KG/M2 | HEART RATE: 58 BPM | DIASTOLIC BLOOD PRESSURE: 79 MMHG | SYSTOLIC BLOOD PRESSURE: 110 MMHG | HEIGHT: 62 IN | OXYGEN SATURATION: 99 %

## 2022-01-26 DIAGNOSIS — M32.19 OTHER SYSTEMIC LUPUS ERYTHEMATOSUS WITH OTHER ORGAN INVOLVEMENT (HCC): ICD-10-CM

## 2022-01-26 DIAGNOSIS — R73.03 PRE-DIABETES: Primary | ICD-10-CM

## 2022-01-26 PROCEDURE — 99213 OFFICE O/P EST LOW 20 MIN: CPT | Performed by: NURSE PRACTITIONER

## 2022-01-26 ASSESSMENT — ENCOUNTER SYMPTOMS
COUGH: 0
SHORTNESS OF BREATH: 0

## 2022-01-26 NOTE — PROGRESS NOTES
[facility]  [address], [address]  [phone]    Torres Bangura is a 46 y.o. female who presents today for her  medical conditions/complaintsas noted below. Torres Bangura is c/o of Other ([facility] paperwork)  . HPI:     HPI     Sees dr Renetta Garcia rheum in feb    Pre- diabetes- discussed exercise and diet at last visit for an a1c of 6.5, she states she has been unable to make any major changes d/t f\amily issues. She would like three months to try again. Nursing note reviewedand discussed with patient. Patient'smedications, allergies, past medical, surgical, social and family histories werereviewed and updated as appropriate. Current Outpatient Medications on File Prior to Visit   Medication Sig Dispense Refill    cyclobenzaprine (FLEXERIL) 10 MG tablet TAKE 1 TABLET BY MOUTH EVERY NIGHT AT BEDTIME AS NEEDED FOR MUSCLE SPASMS 30 tablet 0    Metoprolol Tartrate 75 MG TABS TAKE 1 TABLET BY MOUTH TWICE DAILY 180 tablet 1    hydroCHLOROthiazide (HYDRODIURIL) 12.5 MG tablet TAKE 1 TABLET BY MOUTH DAILY 90 tablet 1    EUTHYROX 112 MCG tablet Take 1 tablet by mouth once daily 30 tablet 5    simvastatin (ZOCOR) 20 MG tablet Take 1 tablet by mouth every evening 90 tablet 1    verapamil (VERELAN) 360 MG extended release capsule TAKE 1 CAPSULE BY MOUTH EVERY NIGHT 90 capsule 1    celecoxib (CELEBREX) 200 MG capsule Take 1 capsule by mouth 2 times daily      mycophenolate (CELLCEPT) 500 MG tablet Take 1 tablet by mouth 2 times daily      azaTHIOprine (IMURAN) 50 MG tablet TK 1 T PO D  2    HYDROcodone-acetaminophen (NORCO) 5-325 MG per tablet Take 1 tablet by mouth 2 times daily as needed for Pain . 20 tablet 0     No current facility-administered medications on file prior to visit.      Past Medical History:   Diagnosis Date    Anxiety     DUB (dysfunctional uterine bleeding)     Fibromyalgia 3/19/2018    Headache 2/11/2014    Hyperlipemia  Hypothyroidism     Iron deficiency anemia     Lupus ([facility] [address]     Neuropathy 3/19/2018    Obesity     Osteoarthritis     Tilt table evaluation 03/21/2018    Uncontrolled hypertension 8/18/2013      Past Surgical History:   Procedure Laterality Date    ENDOMETRIAL ABLATION  9/17/2010    D & C, Hysterscopy with NovaSure device    HYSTERECTOMY  2/11/2011    Da Cornelia Hysterectomy     Family History   Problem Relation Age of Onset    High Blood Pressure Mother     High Blood Pressure Father     Diabetes Brother     High Blood Pressure Brother     Breast Cancer Maternal Aunt     High Blood Pressure Maternal Grandmother     High Blood Pressure Maternal Grandfather     High Blood Pressure Paternal Grandmother     Breast Cancer Paternal Grandmother     High Blood Pressure Paternal Grandfather     High Blood Pressure Son      Social History     Tobacco Use    Smoking status: Never Smoker    Smokeless tobacco: Never Used   Substance Use Topics    Alcohol use: No      No Known Allergies    Subjective:     Review of Systems   Constitutional: Negative for chills and fever. Respiratory: Negative for cough and shortness of breath. Cardiovascular: Negative for chest pain, palpitations and leg swelling. Objective:     /79   Pulse 58   Temp 97.2 °F (36.2 °C)   Ht 5' 2\" (1.575 m)   Wt 183 lb 6.4 oz (83.2 kg)   LMP  (LMP Unknown)   SpO2 99%   BMI 33.54 kg/m²    Physical Exam  Constitutional:       Appearance: She is well-developed. HENT:      Right Ear: External ear normal.      Left Ear: External ear normal.      Nose: Nose normal.   Cardiovascular:      Rate and Rhythm: Normal rate and regular rhythm. Heart sounds: Normal heart sounds, S1 normal and S2 normal.   Pulmonary:      Effort: Pulmonary effort is normal. No respiratory distress. Breath sounds: Normal breath sounds. Musculoskeletal:         General: No deformity. Normal range of motion.       Cervical back: Full passive range of motion without pain and normal range of motion. Skin:     General: Skin is warm and dry. Neurological:      Mental Status: She is alert and oriented to person, place, and time. Assessment/Plan         1. Pre-diabetes  Not sure that she has made enough changes to bring down a1c- she had some family issues  She is going to work on this and we will recheck a1c in 3  Month     2. Other systemic lupus erythematosus with other organ involvement ([facility] [address]  Stable. RTO if symptoms worsen or fail to improve  Pt agreeable with plan      Patient given educationalmaterials - see patient instructions. Discussed use, benefit, and side effectsof prescribed medications. All patient questions answered. Pt voiced understanding. Reviewed health maintenance. Instructed to continue current medications, diet andexercise. 1.  Sarah received counseling on the following healthy behaviors: nutrition, exercise and medication adherence  2. Patient given educational materials when available - see patient instructionswhen applicable  3. Discussed use, benefit, and side effects of prescribed medications. Barriersto medication compliance addressed. All patient questions answered. Pt voicedunderstanding. 4.  Reviewed prior labs and health maintenance  5. Continuecurrent medications, diet and exercise. Completed Refills   Requested Prescriptions      No prescriptions requested or ordered in this encounter         This note was transcribed using dictation with Dragon services. Efforts were made to correct any errors but some words may be misinterpreted.     Electronically signed by ORVILLE Cooley CNP, CNP on 1/26/2022 at 3:39 PM

## 2022-02-20 DIAGNOSIS — M62.830 BACK SPASM: ICD-10-CM

## 2022-02-21 RX ORDER — CYCLOBENZAPRINE HCL 10 MG
TABLET ORAL
Qty: 30 TABLET | Refills: 5 | Status: SHIPPED | OUTPATIENT
Start: 2022-02-21 | End: 2022-07-26

## 2022-02-21 NOTE — TELEPHONE ENCOUNTER
Health Maintenance   Topic Date Due    Hepatitis C screen  Never done    Pneumococcal 0-64 years Vaccine (1 of 4 - PCV13) Never done    Depression Screen  Never done    HIV screen  Never done    DTaP/Tdap/Td vaccine (1 - Tdap) Never done    Shingles Vaccine (1 of 2) Never done    Lipid screen  09/13/2019    Colorectal Cancer Screen  01/15/2021    Flu vaccine (1) Never done    COVID-19 Vaccine (3 - Pfizer risk 4-dose series) 10/22/2021    A1C test (Diabetic or Prediabetic)  11/04/2021    TSH testing  04/24/2022    Potassium monitoring  07/13/2022    Creatinine monitoring  07/13/2022    Breast cancer screen  12/02/2022    Hepatitis A vaccine  Aged Out    Hepatitis B vaccine  Aged Out    Hib vaccine  Aged Out    Meningococcal (ACWY) vaccine  Aged Out             (applicable per patient's age: Cancer Screenings, Depression Screening, Fall Risk Screening, Immunizations)    Hemoglobin A1C (%)   Date Value   08/04/2021 6.5   01/15/2020 6.0   06/26/2018 6.1     LDL Cholesterol (mg/dL)   Date Value   03/09/2017 112     LDL Calculated (mg/dL)   Date Value   09/13/2018 86     AST (U/L)   Date Value   07/13/2021 22     ALT (U/L)   Date Value   07/13/2021 13     BUN (mg/dL)   Date Value   07/13/2021 24 (H)      (goal A1C is < 7)   (goal LDL is <100) need 30-50% reduction from baseline     BP Readings from Last 3 Encounters:   01/26/22 110/79   08/04/21 123/89   02/03/21 132/80    (goal /80)      All Future Testing planned in [facility]:  Lab Frequency Next Occurrence   POCT FECAL IMMUNOCHEMICAL TEST (FIT) Once 12/07/2021   Lipid, Fasting Once 12/13/2021       Next Visit Date:  Future Appointments   Date Time Provider Steph Vizcarra   4/27/2022  3:15 PM ORVILLE Zimmer - CNP [facility] [place] [facility]            Patient Active Problem List:     Anemia     Fatigue     Uterine fibroid     Obesity     Dyspepsia     DUB (dysfunctional uterine bleeding)     Dysmenorrhea     Anxiety     Stress Uncontrolled hypertension     Problem with medical care compliance     Lumbar back sprain     Headache     Groin strain     Unstable gait     Sprain, lumbosacral     Constipation by delayed colonic transit     Non morbid obesity due to excess calories     Mixed hyperlipidemia     Arthralgia of multiple joints     Hx of fracture of ankle     Hypothyroidism     Systemic lupus erythematosus (HCC)     DJD (degenerative joint disease), lumbosacral     Near syncope     Fibromyalgia     Neuropathy     Osteoarthritis of right hip

## 2022-03-14 DIAGNOSIS — I10 ESSENTIAL HYPERTENSION: ICD-10-CM

## 2022-03-14 RX ORDER — HYDROCHLOROTHIAZIDE 12.5 MG/1
12.5 TABLET ORAL DAILY
Qty: 90 TABLET | Refills: 1 | Status: SHIPPED | OUTPATIENT
Start: 2022-03-14 | End: 2022-09-27

## 2022-03-14 NOTE — TELEPHONE ENCOUNTER
Health Maintenance   Topic Date Due    Hepatitis C screen  Never done    Pneumococcal 0-64 years Vaccine (1 of 4 - PCV13) Never done    Depression Screen  Never done    HIV screen  Never done    DTaP/Tdap/Td vaccine (1 - Tdap) Never done    Shingles Vaccine (1 of 2) Never done    Lipid screen  09/13/2019    Colorectal Cancer Screen  01/15/2021    Flu vaccine (1) Never done    COVID-19 Vaccine (3 - Pfizer risk 4-dose series) 10/22/2021    A1C test (Diabetic or Prediabetic)  11/04/2021    TSH testing  04/24/2022    Potassium monitoring  07/13/2022    Creatinine monitoring  07/13/2022    Breast cancer screen  12/02/2022    Hepatitis A vaccine  Aged Out    Hepatitis B vaccine  Aged Out    Hib vaccine  Aged Out    Meningococcal (ACWY) vaccine  Aged Out             (applicable per patient's age: Cancer Screenings, Depression Screening, Fall Risk Screening, Immunizations)    Hemoglobin A1C (%)   Date Value   08/04/2021 6.5   01/15/2020 6.0   06/26/2018 6.1     LDL Cholesterol (mg/dL)   Date Value   03/09/2017 112     LDL Calculated (mg/dL)   Date Value   09/13/2018 86     AST (U/L)   Date Value   07/13/2021 22     ALT (U/L)   Date Value   07/13/2021 13     BUN (mg/dL)   Date Value   07/13/2021 24 (H)      (goal A1C is < 7)   (goal LDL is <100) need 30-50% reduction from baseline     BP Readings from Last 3 Encounters:   01/26/22 110/79   08/04/21 123/89   02/03/21 132/80    (goal /80)      All Future Testing planned in [facility]:  Lab Frequency Next Occurrence   POCT FECAL IMMUNOCHEMICAL TEST (FIT) Once 12/07/2021   Lipid, Fasting Once 12/13/2021       Next Visit Date:  Future Appointments   Date Time Provider Steph Vizcarra   4/27/2022  3:15 PM Montana Lima APRN - CNP [facility] [address] [facility]            Patient Active Problem List:     Anemia     Fatigue     Uterine fibroid     Obesity     Dyspepsia     DUB (dysfunctional uterine bleeding)     Dysmenorrhea     Anxiety     Stress Uncontrolled hypertension     Problem with medical care compliance     Lumbar back sprain     Headache     Groin strain     Unstable gait     Sprain, lumbosacral     Constipation by delayed colonic transit     Non morbid obesity due to excess calories     Mixed hyperlipidemia     Arthralgia of multiple joints     Hx of fracture of ankle     Hypothyroidism     Systemic lupus erythematosus (HCC)     DJD (degenerative joint disease), lumbosacral     Near syncope     Fibromyalgia     Neuropathy     Osteoarthritis of right hip

## 2022-03-22 ENCOUNTER — HOSPITAL ENCOUNTER (OUTPATIENT)
Age: 52
Discharge: HOME | End: 2022-03-22
Payer: COMMERCIAL

## 2022-03-22 LAB
ABSOLUTE EOS #: 0.29 K/UL (ref 0–0.44)
ABSOLUTE IMMATURE GRANULOCYTE: 0.04 K/UL (ref 0–0.3)
ABSOLUTE LYMPH #: 1.36 K/UL (ref 1.1–3.7)
ABSOLUTE MONO #: 0.82 K/UL (ref 0.1–1.2)
ALBUMIN SERPL-MCNC: 4.5 G/DL (ref 3.5–5.2)
ALBUMIN/GLOBULIN RATIO: 1.4 (ref 1–2.5)
ALP BLD-CCNC: 92 U/L (ref 35–104)
ALT SERPL-CCNC: 16 U/L (ref 5–33)
ANION GAP SERPL CALCULATED.3IONS-SCNC: 14 MMOL/L (ref 9–17)
AST SERPL-CCNC: 14 U/L
BASOPHILS # BLD: 1 % (ref 0–2)
BASOPHILS ABSOLUTE: 0.06 K/UL (ref 0–0.2)
BILIRUB SERPL-MCNC: 0.32 MG/DL (ref 0.3–1.2)
BUN BLDV-MCNC: 34 MG/DL (ref 6–20)
CALCIUM SERPL-MCNC: 9.8 MG/DL (ref 8.6–10.4)
CHLORIDE BLD-SCNC: 103 MMOL/L (ref 98–107)
CO2: 23 MMOL/L (ref 20–31)
CREAT SERPL-MCNC: 1.32 MG/DL (ref 0.5–0.9)
EOSINOPHILS RELATIVE PERCENT: 4 % (ref 1–4)
GFR AFRICAN AMERICAN: 51 ML/MIN
GFR NON-AFRICAN AMERICAN: 42 ML/MIN
GFR SERPL CREATININE-BSD FRML MDRD: ABNORMAL ML/MIN/{1.73_M2}
GLUCOSE BLD-MCNC: 116 MG/DL (ref 70–99)
HCT VFR BLD CALC: 33.4 % (ref 36.3–47.1)
HEMOGLOBIN: 10.9 G/DL (ref 11.9–15.1)
HIGH SENSITIVE C-REACTIVE PROTEIN: 10.6 MG/L
IMMATURE GRANULOCYTES: 1 %
LYMPHOCYTES # BLD: 19 % (ref 24–43)
MCH RBC QN AUTO: 26.6 PG (ref 25.2–33.5)
MCHC RBC AUTO-ENTMCNC: 32.6 G/DL (ref 28.4–34.8)
MCV RBC AUTO: 81.5 FL (ref 82.6–102.9)
MONOCYTES # BLD: 11 % (ref 3–12)
NRBC AUTOMATED: 0 PER 100 WBC
PDW BLD-RTO: 12.5 % (ref 11.8–14.4)
PLATELET # BLD: 505 K/UL (ref 138–453)
PMV BLD AUTO: 10.8 FL (ref 8.1–13.5)
POTASSIUM SERPL-SCNC: 3.9 MMOL/L (ref 3.7–5.3)
RBC # BLD: 4.1 M/UL (ref 3.95–5.11)
RBC # BLD: ABNORMAL 10*6/UL
SEDIMENTATION RATE, ERYTHROCYTE: 13 MM/HR (ref 0–30)
SEG NEUTROPHILS: 64 % (ref 36–65)
SEGMENTED NEUTROPHILS ABSOLUTE COUNT: 4.7 K/UL (ref 1.5–8.1)
SODIUM BLD-SCNC: 140 MMOL/L (ref 135–144)
TOTAL PROTEIN: 7.7 G/DL (ref 6.4–8.3)
WBC # BLD: 7.3 K/UL (ref 3.5–11.3)

## 2022-03-22 PROCEDURE — 85652 RBC SED RATE AUTOMATED: CPT

## 2022-03-22 PROCEDURE — 86141 C-REACTIVE PROTEIN HS: CPT

## 2022-03-22 PROCEDURE — 85025 COMPLETE CBC W/AUTO DIFF WBC: CPT

## 2022-03-22 PROCEDURE — 80053 COMPREHEN METABOLIC PANEL: CPT

## 2022-03-22 PROCEDURE — 36415 COLL VENOUS BLD VENIPUNCTURE: CPT

## 2022-04-11 ENCOUNTER — HOSPITAL ENCOUNTER (OUTPATIENT)
Age: 52
Setting detail: SPECIMEN
Discharge: HOME | End: 2022-04-11
Payer: COMMERCIAL

## 2022-04-11 LAB
-: ABNORMAL
BACTERIA: ABNORMAL
BILIRUBIN URINE: NEGATIVE
BUN BLDV-MCNC: 27 MG/DL (ref 6–20)
CASTS UA: ABNORMAL /LPF (ref 0–8)
COLOR: YELLOW
CREAT SERPL-MCNC: 1.08 MG/DL (ref 0.5–0.9)
EPITHELIAL CELLS UA: ABNORMAL /HPF (ref 0–5)
GFR AFRICAN AMERICAN: >60 ML/MIN
GFR NON-AFRICAN AMERICAN: 53 ML/MIN
GFR SERPL CREATININE-BSD FRML MDRD: ABNORMAL ML/MIN/{1.73_M2}
GLUCOSE URINE: NEGATIVE
KETONES, URINE: NEGATIVE
LEUKOCYTE ESTERASE, URINE: NEGATIVE
NITRITE, URINE: POSITIVE
PH UA: 5 (ref 5–8)
PROTEIN UA: NEGATIVE
RBC UA: ABNORMAL /HPF (ref 0–4)
SPECIFIC GRAVITY UA: 1.01 (ref 1–1.03)
TURBIDITY: CLEAR
URINE HGB: NEGATIVE
UROBILINOGEN, URINE: NORMAL
WBC UA: ABNORMAL /HPF (ref 0–5)

## 2022-04-11 PROCEDURE — 81001 URINALYSIS AUTO W/SCOPE: CPT

## 2022-04-11 PROCEDURE — 84520 ASSAY OF UREA NITROGEN: CPT

## 2022-04-11 PROCEDURE — 82565 ASSAY OF CREATININE: CPT

## 2022-04-11 PROCEDURE — 36415 COLL VENOUS BLD VENIPUNCTURE: CPT

## 2022-04-18 ENCOUNTER — TELEPHONE (OUTPATIENT)
Dept: PRIMARY CARE CLINIC | Age: 52
End: 2022-04-18

## 2022-04-18 NOTE — TELEPHONE ENCOUNTER
Pt called to see if she could get antibiotic for uti.  She had a urinalysis done and she was positive for nitrites.        Contains abnormal data Urinalysis  Order: [id number]   Status: Edited Result - FINAL     Visible to patient: Yes (not seen)     Next appt: 04/27/2022 at 03:15 PM in Primary Care ORVILLE Guardado CNP)     0 Result Notes    Component Ref Range & Units 4/11/22 1155 1/11/15 1131 11/7/13 2213 3/11/13 1048 8/18/12 0902 8/8/12 1218   Color, UA Yellow Yellow  YELLOW [facility]  YELLOW R   YELLOW R     Turbidity UA Clear Clear  SLIGHTLY CLOUDY Abnormal  [facility]  SLIGHTLY CLOUDY Abnormal  R   CLEAR R     Glucose, Ur NEGATIVE NEGATIVE  NEGATIVE [facility]  NEGATIVE R   NEGATIVE R     Bilirubin Urine NEGATIVE NEGATIVE  NEGATIVE [facility]  NEGATIVE R   NEGATIVE R     Ketones, Urine NEGATIVE NEGATIVE  NEGATIVE [facility]  NEGATIVE R   NEGATIVE R     Specific [place], UA 1.005 - 1.030 1.014  1.020 [facility]  1.010   1.015     Urine Hgb NEGATIVE NEGATIVE  3+ Abnormal  [facility]  NEGATIVE R   NEGATIVE R     pH, UA 5.0 - 8.0 5.0  6.0 [facility]  5.5   5.5     Protein, UA NEGATIVE NEGATIVE  2+ Abnormal  [facility]  NEGATIVE R  NEG R  NEGATIVE R  neg R    Urobilinogen, Urine Normal Normal  ELEVATED Abnormal  [facility]  Normal R   Normal R     Nitrite, Urine NEGATIVE POSITIVE Abnormal   POSITIVE Abnormal  [facility]  NEGATIVE R   NEGATIVE R     Leukocyte Esterase, Urine NEGATIVE NEGATIVE  MOD Abnormal  R, [facility]  NEGATIVE R, CM   TRACE Abnormal  R, CM     Urinalysis Comments   NOT REPORTED [1]  NOT REPORTED   NOT REPORTED     Urobilinogen, UA     NEG   0.2    Leukocytes, UA     NEG   neg    Nitrite, UA     NEG   neg    Appearance, Fluid       Clear R    Color, UA     YELLOW   yellow    Clarity, UA     CLEAR   clear    Glucose, UA POC     NEG   neg    Bilirubin, UA     NEG   neg    Ketones, UA     NEG   neg    Spec Grav, UA     1.015   1.015    Blood, UA POC     NEG   neg    pH, UA     6.0   5.0    Resulting Agency [facility] [facility]   [address] [facility]               Specimen Collected: 04/11/22 11:55 Last Resulted: 04/11/22 14:35        Lab Flowsheet     Order Details     View Encounter     Lab and Collection Details     Routing     Result History        CM=Additional comments  R=Reference range differs from displayed range  [1]=[facility] [address]  Lab          Result Care Coordination      Patient Communication    Add Comments  Add Notifications  Back to Top           Testing Performed By:    [address] [place] [address] LAB   [address] [place] [id number]   Tel: [phone]   : Sabrina Schaumann, MD                Result Information    Flag: Abnormal Abnormal   Status: Edited Result - FINAL (Collected: 4/11/2022 11:55) Provider Status: Ordered     Urinalysis: Patient Communication    Add Comments  Add Notifications      Click to Print Result      View [email or web address] Info    Urinalysis (Order #[id number]) on 4/11/22     Order Report    Order Details    Preferred pharmacy is the [facility]. Please advise.

## 2022-04-20 RX ORDER — NITROFURANTOIN 25; 75 MG/1; MG/1
100 CAPSULE ORAL 2 TIMES DAILY
Qty: 14 CAPSULE | Refills: 0 | Status: SHIPPED | OUTPATIENT
Start: 2022-04-20 | End: 2022-04-27

## 2022-04-20 NOTE — TELEPHONE ENCOUNTER
Pt called. She is not having any pain or other symptoms. Dr. Ramesh Li took urinalysis as part of regular check up due to patient having Lupus. Dr. Ramesh Li is now on medical alf and will be unable to prescribe antibiotic.

## 2022-04-27 ENCOUNTER — OFFICE VISIT (OUTPATIENT)
Dept: PRIMARY CARE CLINIC | Age: 52
End: 2022-04-27
Payer: COMMERCIAL

## 2022-04-27 VITALS
HEART RATE: 77 BPM | OXYGEN SATURATION: 98 % | HEIGHT: 62 IN | TEMPERATURE: 97.3 F | SYSTOLIC BLOOD PRESSURE: 120 MMHG | BODY MASS INDEX: 31.9 KG/M2 | WEIGHT: 173.38 LBS | DIASTOLIC BLOOD PRESSURE: 81 MMHG

## 2022-04-27 DIAGNOSIS — R73.03 PRE-DIABETES: Primary | ICD-10-CM

## 2022-04-27 DIAGNOSIS — M32.19 OTHER SYSTEMIC LUPUS ERYTHEMATOSUS WITH OTHER ORGAN INVOLVEMENT (HCC): ICD-10-CM

## 2022-04-27 DIAGNOSIS — K59.00 CONSTIPATION, UNSPECIFIED CONSTIPATION TYPE: ICD-10-CM

## 2022-04-27 DIAGNOSIS — E03.9 HYPOTHYROIDISM, UNSPECIFIED TYPE: ICD-10-CM

## 2022-04-27 LAB — HBA1C MFR BLD: 6 %

## 2022-04-27 PROCEDURE — 83036 HEMOGLOBIN GLYCOSYLATED A1C: CPT | Performed by: NURSE PRACTITIONER

## 2022-04-27 PROCEDURE — 99214 OFFICE O/P EST MOD 30 MIN: CPT | Performed by: NURSE PRACTITIONER

## 2022-04-27 RX ORDER — HYDROCODONE BITARTRATE AND ACETAMINOPHEN 5; 325 MG/1; MG/1
1 TABLET ORAL 3 TIMES DAILY PRN
Qty: 90 TABLET | Refills: 0 | Status: SHIPPED | OUTPATIENT
Start: 2022-05-22 | End: 2022-07-20 | Stop reason: SDUPTHER

## 2022-04-27 RX ORDER — POLYETHYLENE GLYCOL 3350 17 G/17G
17 POWDER, FOR SOLUTION ORAL DAILY
Qty: 510 G | Refills: 11 | Status: SHIPPED | OUTPATIENT
Start: 2022-04-27

## 2022-04-27 RX ORDER — PREDNISONE 1 MG/1
TABLET ORAL
COMMUNITY
Start: 2022-03-02 | End: 2022-04-27

## 2022-04-27 RX ORDER — DICLOFENAC SODIUM 75 MG/1
TABLET, DELAYED RELEASE ORAL
COMMUNITY
Start: 2022-03-02 | End: 2022-04-27

## 2022-04-27 ASSESSMENT — PATIENT HEALTH QUESTIONNAIRE - PHQ9
SUM OF ALL RESPONSES TO PHQ QUESTIONS 1-9: 0
SUM OF ALL RESPONSES TO PHQ9 QUESTIONS 1 & 2: 0
1. LITTLE INTEREST OR PLEASURE IN DOING THINGS: 0
SUM OF ALL RESPONSES TO PHQ QUESTIONS 1-9: 0
2. FEELING DOWN, DEPRESSED OR HOPELESS: 0

## 2022-04-27 ASSESSMENT — ENCOUNTER SYMPTOMS
COUGH: 0
SHORTNESS OF BREATH: 0

## 2022-04-27 NOTE — PROGRESS NOTES
Visit Information    Have you changed or started any medications since your last visit including any over-the-counter medicines, vitamins, or herbal medicines? no   Are you having any side effects from any of your medications? -  no  Have you stopped taking any of your medications? Is so, why? -  no    Have you seen any other physician or provider since your last visit? No  Have you had any other diagnostic tests since your last visit? No  Have you been seen in the emergency room and/or had an admission to a hospital since we last saw you? No  Have you had your routine dental cleaning in the past 6 months? no    Have you activated your [employer] account? If not, what are your barriers?  No:     Patient Care Team:  ORVILLE Gardner CNP as PCP - General (Certified Nurse Practitioner)  ORVILLE Gardner CNP as PCP - [facility] Provider    Medical History Review  Past Medical, Family, and Social History reviewed and contribute to the patient presenting condition    Health Maintenance   Topic Date Due    Pneumococcal 0-64 years Vaccine (1 - PCV) Never done    HIV screen  Never done    Hepatitis C screen  Never done    DTaP/Tdap/Td vaccine (1 - Tdap) Never done    Shingles Vaccine (1 of 2) Never done    Lipids  09/13/2019    Colorectal Cancer Screen  01/15/2021    COVID-19 Vaccine (3 - Pfizer risk 4-dose series) 10/22/2021    A1C test (Diabetic or Prediabetic)  11/04/2021    Depression Screen  02/03/2022    TSH  04/24/2022    Flu vaccine (Season Ended) 09/01/2022    Breast cancer screen  12/02/2022    Potassium  03/22/2023    Creatinine  04/11/2023    Hepatitis A vaccine  Aged Out    Hepatitis B vaccine  Aged Out    Hib vaccine  Aged Out    Meningococcal (ACWY) vaccine  Aged Out

## 2022-04-27 NOTE — PROGRESS NOTES
Rancho Villanueva (:  1970) is a 46 y.o. female,Established patient, here for evaluation of the following chief complaint(s):  3 Month Follow-Up         ASSESSMENT/PLAN:  1. Pre-diabetes  -     POCT glycosylated hemoglobin (Hb A1C)  2. Other systemic lupus erythematosus with other organ involvement (HCC)  -     diclofenac sodium (VOLTAREN) 1 % GEL; Apply 2 g topically 4 times daily, Topical, 4 TIMES DAILY Starting Wed 2022, Disp-100 g, R-2, Normal  -     HYDROcodone-acetaminophen (NORCO) 5-325 MG per tablet; Take 1 tablet by mouth 3 times daily as needed for Pain for up to 30 days. Intended supply: 3 days. Take lowest dose possible to manage pain, Disp-90 tablet, R-0Normal  3. Hypothyroidism, unspecified type  -     TSH; Future  4. Constipation, unspecified constipation type  -     polyethylene glycol (MIRALAX) 17 GM/SCOOP powder; Take 17 g by mouth daily, Disp-510 g, R-11Normal  if no improvement in constipation will give referral to gi. No follow-ups on file. Subjective   SUBJECTIVE/OBJECTIVE:  HPI   Pt is having constipation. Was using equate stool softener. did not help. Started dulcolax, not helping. Constipation is normal for her but this is worse. Before this episode it was normal for her to have a bm every couple days. No change in shape of stool. Did have a small amt of brbpr last week but she was really straining. She hates vegetables. She just started increasing her water intake. She has lost 13 lbs but this was intentional.      She is using 1-2 norco a day for her lupus flares- flares are mostly in her knees, occasionally full body- her rheumatologist left and the remainin gproviders will not provide for her. Review of Systems   Constitutional: Negative for chills and fever. Respiratory: Negative for cough and shortness of breath. Cardiovascular: Negative for chest pain, palpitations and leg swelling.        Objective      Wt Readings from Last 3 Encounters: 04/27/22 173 lb 6 oz (78.6 kg)   01/26/22 183 lb 6.4 oz (83.2 kg)   08/04/21 186 lb 6.4 oz (84.6 kg)     Vitals:    04/27/22 1510   BP: 120/81   Pulse:    Temp:    SpO2:          Physical Exam  Constitutional:       Appearance: She is well-developed. HENT:      Right Ear: External ear normal.      Left Ear: External ear normal.      Nose: Nose normal.   Cardiovascular:      Rate and Rhythm: Normal rate and regular rhythm. Heart sounds: Normal heart sounds, S1 normal and S2 normal.   Pulmonary:      Effort: Pulmonary effort is normal. No respiratory distress. Breath sounds: Normal breath sounds. Musculoskeletal:         General: No deformity. Normal range of motion. Cervical back: Full passive range of motion without pain and normal range of motion. Skin:     General: Skin is warm and dry. Neurological:      Mental Status: She is alert and oriented to person, place, and time. An electronic signature was used to authenticate this note.     --ORVILLE Toth - CNP

## 2022-05-08 DIAGNOSIS — E03.9 HYPOTHYROIDISM, UNSPECIFIED TYPE: ICD-10-CM

## 2022-05-09 RX ORDER — LEVOTHYROXINE SODIUM 112 UG/1
TABLET ORAL
Qty: 30 TABLET | Refills: 2 | Status: SHIPPED | OUTPATIENT
Start: 2022-05-09 | End: 2022-08-15

## 2022-05-09 NOTE — TELEPHONE ENCOUNTER
Health Maintenance   Topic Date Due    Pneumococcal 0-64 years Vaccine (1 - PCV) Never done    HIV screen  Never done    Hepatitis C screen  Never done    DTaP/Tdap/Td vaccine (1 - Tdap) Never done    Shingles vaccine (1 of 2) Never done    Lipids  09/13/2019    Colorectal Cancer Screen  01/15/2021    COVID-19 Vaccine (3 - Pfizer risk 4-dose series) 10/22/2021    TSH  04/24/2022    Flu vaccine (Season Ended) 09/01/2022    Breast cancer screen  12/02/2022    Potassium  03/22/2023    Creatinine  04/11/2023    A1C test (Diabetic or Prediabetic)  04/27/2023    Depression Screen  04/27/2023    Hepatitis A vaccine  Aged Out    Hepatitis B vaccine  Aged Out    Hib vaccine  Aged Out    Meningococcal (ACWY) vaccine  Aged Out             (applicable per patient's age: Cancer Screenings, Depression Screening, Fall Risk Screening, Immunizations)    Hemoglobin A1C (%)   Date Value   04/27/2022 6.0   08/04/2021 6.5   01/15/2020 6.0     LDL Cholesterol (mg/dL)   Date Value   03/09/2017 112     LDL Calculated (mg/dL)   Date Value   09/13/2018 86     AST (U/L)   Date Value   03/22/2022 14     ALT (U/L)   Date Value   03/22/2022 16     BUN (mg/dL)   Date Value   04/11/2022 27 (H)      (goal A1C is < 7)   (goal LDL is <100) need 30-50% reduction from baseline     BP Readings from Last 3 Encounters:   04/27/22 120/81   01/26/22 110/79   08/04/21 123/89    (goal /80)      All Future Testing planned in [facility]:  Lab Frequency Next Occurrence   POCT FECAL IMMUNOCHEMICAL TEST (FIT) Once 12/07/2021   Lipid, Fasting Once 12/13/2021   TSH Once 04/27/2022       Next Visit Date:  Future Appointments   Date Time Provider Steph Vizcarra   7/27/2022  3:00 PM ORVILLE Reed CNP [facility]            Patient Active Problem List:     Anemia     Fatigue     Uterine fibroid     Obesity     Dyspepsia     DUB (dysfunctional uterine bleeding)     Dysmenorrhea     Anxiety     Stress     Uncontrolled hypertension     Problem with medical care compliance     Lumbar back sprain     Headache     Groin strain     Unstable gait     Sprain, lumbosacral     Constipation by delayed colonic transit     Non morbid obesity due to excess calories     Mixed hyperlipidemia     Arthralgia of multiple joints     Hx of fracture of ankle     Hypothyroidism     Systemic lupus erythematosus (HCC)     DJD (degenerative joint disease), lumbosacral     Near syncope     Fibromyalgia     Neuropathy     Osteoarthritis of right hip

## 2022-05-18 ENCOUNTER — OFFICE VISIT (OUTPATIENT)
Dept: PRIMARY CARE CLINIC | Age: 52
End: 2022-05-18
Payer: COMMERCIAL

## 2022-05-18 VITALS
SYSTOLIC BLOOD PRESSURE: 137 MMHG | TEMPERATURE: 97.1 F | WEIGHT: 175 LBS | HEART RATE: 116 BPM | DIASTOLIC BLOOD PRESSURE: 95 MMHG | BODY MASS INDEX: 32.42 KG/M2 | OXYGEN SATURATION: 97 %

## 2022-05-18 DIAGNOSIS — H65.02 NON-RECURRENT ACUTE SEROUS OTITIS MEDIA OF LEFT EAR: Primary | ICD-10-CM

## 2022-05-18 PROCEDURE — 99213 OFFICE O/P EST LOW 20 MIN: CPT | Performed by: NURSE PRACTITIONER

## 2022-05-18 RX ORDER — AMOXICILLIN 875 MG/1
875 TABLET, COATED ORAL 2 TIMES DAILY
Qty: 20 TABLET | Refills: 0 | Status: SHIPPED | OUTPATIENT
Start: 2022-05-18 | End: 2022-05-28

## 2022-05-18 RX ORDER — CELECOXIB 100 MG/1
CAPSULE ORAL
COMMUNITY
Start: 2022-05-11

## 2022-05-18 ASSESSMENT — ENCOUNTER SYMPTOMS
RHINORRHEA: 0
COUGH: 0

## 2022-05-18 NOTE — PROGRESS NOTES
Rj Cochran (:  1970) is a 46 y.o. female,Established patient, here for evaluation of the following chief complaint(s):  Otalgia (left ear. pt stated that she is having pressure, and stated that she is having a lot of pain )         ASSESSMENT/PLAN:  1. Non-recurrent acute serous otitis media of left ear  -     amoxicillin (AMOXIL) 875 MG tablet; Take 1 tablet by mouth 2 times daily for 10 days, Disp-20 tablet, R-0Normal      No follow-ups on file. Subjective   SUBJECTIVE/OBJECTIVE:  Otalgia   There is pain in the left ear. The problem has been gradually worsening. There has been no fever. Associated symptoms include hearing loss. Pertinent negatives include no coughing, ear discharge or rhinorrhea. She has tried acetaminophen for the symptoms. The treatment provided mild relief. Review of Systems   HENT: Positive for ear pain and hearing loss. Negative for ear discharge and rhinorrhea. Respiratory: Negative for cough. Objective    Vitals:    22 0954   BP: (!) 137/95   Pulse:    Temp:    SpO2:        Physical Exam  Constitutional:       Appearance: She is well-developed. HENT:      Right Ear: External ear normal.      Left Ear: External ear normal. No mastoid tenderness. Tympanic membrane is erythematous and bulging. Nose: Nose normal.   Cardiovascular:      Rate and Rhythm: Normal rate and regular rhythm. Heart sounds: Normal heart sounds, S1 normal and S2 normal.   Pulmonary:      Effort: Pulmonary effort is normal. No respiratory distress. Breath sounds: Normal breath sounds. Musculoskeletal:         General: No deformity. Normal range of motion. Cervical back: Full passive range of motion without pain and normal range of motion. Skin:     General: Skin is warm and dry. Neurological:      Mental Status: She is alert and oriented to person, place, and time. An electronic signature was used to authenticate this note.     --Alexey Mccracken Brenda Middleton CNP

## 2022-05-19 ENCOUNTER — TELEPHONE (OUTPATIENT)
Dept: PRIMARY CARE CLINIC | Age: 52
End: 2022-05-19

## 2022-05-19 RX ORDER — BACITRACIN ZINC AND POLYMYXIN B SULFATE 500; 10000 [USP'U]/G; [USP'U]/G
0.5 OINTMENT OPHTHALMIC 2 TIMES DAILY
Qty: 1 EACH | Refills: 0 | Status: SHIPPED | OUTPATIENT
Start: 2022-05-19 | End: 2022-05-29

## 2022-05-19 NOTE — TELEPHONE ENCOUNTER
Patient called and wanted to know if a prescription could be called in for conjunctivitis. Patient did not discuss this at her office visit on 5/18/22 because she thought the eye drainage and redness was due to her cold. However this condition is getting worse. The walk in clinic was offered and patient may come to walk in today if she doesn't hear back from PCP this morning.

## 2022-05-31 ENCOUNTER — TELEPHONE (OUTPATIENT)
Dept: FAMILY MEDICINE CLINIC | Age: 52
End: 2022-05-31

## 2022-05-31 NOTE — TELEPHONE ENCOUNTER
----- Message from Jeffery Ayon sent at 5/31/2022  2:06 PM EDT -----  Subject: Message to Provider    QUESTIONS  Information for Provider? Have you received my FMLA paperwork from   [address]  ---------------------------------------------------------------------------  --------------  [address]  What is the best way for the office to contact you? OK to leave message on   voicemail  Preferred Call Back Phone Number? [phone]  ---------------------------------------------------------------------------  --------------  SCRIPT ANSWERS  Relationship to Patient?  Self

## 2022-07-19 ENCOUNTER — TELEPHONE (OUTPATIENT)
Dept: PRIMARY CARE CLINIC | Age: 52
End: 2022-07-19

## 2022-07-20 DIAGNOSIS — M32.19 OTHER SYSTEMIC LUPUS ERYTHEMATOSUS WITH OTHER ORGAN INVOLVEMENT (HCC): ICD-10-CM

## 2022-07-20 RX ORDER — HYDROCODONE BITARTRATE AND ACETAMINOPHEN 5; 325 MG/1; MG/1
1 TABLET ORAL 3 TIMES DAILY PRN
Qty: 90 TABLET | Refills: 0 | Status: SHIPPED | OUTPATIENT
Start: 2022-07-20 | End: 2022-09-01 | Stop reason: SDUPTHER

## 2022-07-26 DIAGNOSIS — M62.830 BACK SPASM: ICD-10-CM

## 2022-07-26 RX ORDER — CYCLOBENZAPRINE HCL 10 MG
TABLET ORAL
Qty: 30 TABLET | Refills: 0 | Status: SHIPPED | OUTPATIENT
Start: 2022-07-26 | End: 2022-08-30

## 2022-07-26 NOTE — TELEPHONE ENCOUNTER
Health Maintenance   Topic Date Due    Lipids  09/13/2019    Colorectal Cancer Screen  01/15/2021    COVID-19 Vaccine (3 - Pfizer risk series) 08/18/2022 (Originally 10/22/2021)    DTaP/Tdap/Td vaccine (1 - Tdap) 05/18/2023 (Originally 7/14/1987)    Shingles vaccine (1 of 2) 05/18/2023 (Originally 7/14/1987)    Pneumococcal 0-64 years Vaccine (1 - PCV) 05/18/2023 (Originally 7/14/1974)    Hepatitis C screen  05/18/2023 (Originally 7/14/1986)    HIV screen  05/18/2023 (Originally 7/14/1983)    Flu vaccine (1) 09/01/2022    Breast cancer screen  12/02/2022    A1C test (Diabetic or Prediabetic)  04/27/2023    Depression Screen  04/27/2023    Hepatitis A vaccine  Aged Out    Hepatitis B vaccine  Aged Out    Hib vaccine  Aged Out    Meningococcal (ACWY) vaccine  Aged Out             (applicable per patient's age: Cancer Screenings, Depression Screening, Fall Risk Screening, Immunizations)    Hemoglobin A1C (%)   Date Value   04/27/2022 6.0   08/04/2021 6.5   01/15/2020 6.0     LDL Cholesterol (mg/dL)   Date Value   03/09/2017 112     LDL Calculated (mg/dL)   Date Value   09/13/2018 86     AST (U/L)   Date Value   03/22/2022 14     ALT (U/L)   Date Value   03/22/2022 16     BUN (mg/dL)   Date Value   04/11/2022 27 (H)      (goal A1C is < 7)   (goal LDL is <100) need 30-50% reduction from baseline     BP Readings from Last 3 Encounters:   05/18/22 (!) 137/95   04/27/22 120/81   01/26/22 110/79    (goal /80)      All Future Testing planned in [facility]:  Lab Frequency Next Occurrence   POCT FECAL IMMUNOCHEMICAL TEST (FIT) Once 12/07/2021   Lipid, Fasting Once 12/13/2021   TSH Once 04/27/2022       Next Visit Date:  No future appointments.          Patient Active Problem List:     Anemia     Fatigue     Uterine fibroid     Obesity     Dyspepsia     DUB (dysfunctional uterine bleeding)     Dysmenorrhea     Anxiety     Stress     Uncontrolled hypertension     Problem with medical care compliance     Lumbar back sprain Headache     Groin strain     Unstable gait     Sprain, lumbosacral     Constipation by delayed colonic transit     Non morbid obesity due to excess calories     Mixed hyperlipidemia     Arthralgia of multiple joints     Hx of fracture of ankle     Hypothyroidism     Systemic lupus erythematosus (HCC)     DJD (degenerative joint disease), lumbosacral     Near syncope     Fibromyalgia     Neuropathy     Osteoarthritis of right hip

## 2022-08-03 DIAGNOSIS — I10 ESSENTIAL HYPERTENSION: ICD-10-CM

## 2022-08-04 RX ORDER — METOPROLOL TARTRATE 75 MG/1
TABLET, FILM COATED ORAL
Qty: 180 TABLET | Refills: 1 | Status: SHIPPED | OUTPATIENT
Start: 2022-08-04

## 2022-08-04 NOTE — TELEPHONE ENCOUNTER
Hemoglobin A1C (%)   Date Value   04/27/2022 6.0   08/04/2021 6.5   01/15/2020 6.0             ( goal A1C is < 7)   No results found for: LABMICR  LDL Cholesterol (mg/dL)   Date Value   03/09/2017 112     LDL Calculated (mg/dL)   Date Value   09/13/2018 86       (goal LDL is <100)   AST (U/L)   Date Value   03/22/2022 14     ALT (U/L)   Date Value   03/22/2022 16     BUN (mg/dL)   Date Value   04/11/2022 27 (H)     BP Readings from Last 3 Encounters:   05/18/22 (!) 137/95   04/27/22 120/81   01/26/22 110/79          (goal 120/80)        Patient Active Problem List:     Anemia     Fatigue     Uterine fibroid     Obesity     Dyspepsia     DUB (dysfunctional uterine bleeding)     Dysmenorrhea     Anxiety     Stress     Uncontrolled hypertension     Problem with medical care compliance     Lumbar back sprain     Headache     Groin strain     Unstable gait     Sprain, lumbosacral     Constipation by delayed colonic transit     Non morbid obesity due to excess calories     Mixed hyperlipidemia     Arthralgia of multiple joints     Hx of fracture of ankle     Hypothyroidism     Systemic lupus erythematosus (HCC)     DJD (degenerative joint disease), lumbosacral     Near syncope     Fibromyalgia     Neuropathy     Osteoarthritis of right hip      ----Emily López

## 2022-08-10 DIAGNOSIS — E03.9 HYPOTHYROIDISM, UNSPECIFIED TYPE: ICD-10-CM

## 2022-08-11 NOTE — TELEPHONE ENCOUNTER
Health Maintenance   Topic Date Due    Lipids  09/13/2019    Colorectal Cancer Screen  01/15/2021    COVID-19 Vaccine (3 - Pfizer risk series) 08/18/2022 (Originally 10/22/2021)    DTaP/Tdap/Td vaccine (1 - Tdap) 05/18/2023 (Originally 7/14/1987)    Shingles vaccine (1 of 2) 05/18/2023 (Originally 7/14/1987)    Pneumococcal 0-64 years Vaccine (1 - PCV) 05/18/2023 (Originally 7/14/1974)    Hepatitis C screen  05/18/2023 (Originally 7/14/1986)    HIV screen  05/18/2023 (Originally 7/14/1983)    Flu vaccine (1) 09/01/2022    Breast cancer screen  12/02/2022    A1C test (Diabetic or Prediabetic)  04/27/2023    Depression Screen  04/27/2023    Hepatitis A vaccine  Aged Out    Hepatitis B vaccine  Aged Out    Hib vaccine  Aged Out    Meningococcal (ACWY) vaccine  Aged Out             (applicable per patient's age: Cancer Screenings, Depression Screening, Fall Risk Screening, Immunizations)    Hemoglobin A1C (%)   Date Value   04/27/2022 6.0   08/04/2021 6.5   01/15/2020 6.0     LDL Cholesterol (mg/dL)   Date Value   03/09/2017 112     LDL Calculated (mg/dL)   Date Value   09/13/2018 86     AST (U/L)   Date Value   03/22/2022 14     ALT (U/L)   Date Value   03/22/2022 16     BUN (mg/dL)   Date Value   04/11/2022 27 (H)      (goal A1C is < 7)   (goal LDL is <100) need 30-50% reduction from baseline     BP Readings from Last 3 Encounters:   05/18/22 (!) 137/95   04/27/22 120/81   01/26/22 110/79    (goal /80)      All Future Testing planned in [facility]:  Lab Frequency Next Occurrence   TSH Once 04/27/2022       Next Visit Date:  Future Appointments   Date Time Provider Steph Vizcarra   8/24/2022  4:30 PM ORVILLE Whitfield - CNP [facility]            Patient Active Problem List:     Anemia     Fatigue     Uterine fibroid     Obesity     Dyspepsia     DUB (dysfunctional uterine bleeding)     Dysmenorrhea     Anxiety     Stress     Uncontrolled hypertension     Problem with medical care compliance Lumbar back sprain     Headache     Groin strain     Unstable gait     Sprain, lumbosacral     Constipation by delayed colonic transit     Non morbid obesity due to excess calories     Mixed hyperlipidemia     Arthralgia of multiple joints     Hx of fracture of ankle     Hypothyroidism     Systemic lupus erythematosus (HCC)     DJD (degenerative joint disease), lumbosacral     Near syncope     Fibromyalgia     Neuropathy     Osteoarthritis of right hip

## 2022-08-15 RX ORDER — LEVOTHYROXINE SODIUM 112 UG/1
TABLET ORAL
Qty: 30 TABLET | Refills: 5 | Status: SHIPPED | OUTPATIENT
Start: 2022-08-15

## 2022-08-24 ENCOUNTER — OFFICE VISIT (OUTPATIENT)
Dept: PRIMARY CARE CLINIC | Age: 52
End: 2022-08-24
Payer: COMMERCIAL

## 2022-08-24 VITALS
HEART RATE: 60 BPM | OXYGEN SATURATION: 100 % | DIASTOLIC BLOOD PRESSURE: 82 MMHG | TEMPERATURE: 96.8 F | BODY MASS INDEX: 33.54 KG/M2 | SYSTOLIC BLOOD PRESSURE: 130 MMHG | WEIGHT: 181 LBS

## 2022-08-24 DIAGNOSIS — Z12.11 SCREENING FOR MALIGNANT NEOPLASM OF COLON: ICD-10-CM

## 2022-08-24 DIAGNOSIS — Z12.31 ENCOUNTER FOR SCREENING MAMMOGRAM FOR BREAST CANCER: ICD-10-CM

## 2022-08-24 DIAGNOSIS — I10 ESSENTIAL HYPERTENSION: Primary | ICD-10-CM

## 2022-08-24 DIAGNOSIS — Z13.220 SCREENING FOR HYPERLIPIDEMIA: ICD-10-CM

## 2022-08-24 DIAGNOSIS — M32.19 OTHER SYSTEMIC LUPUS ERYTHEMATOSUS WITH OTHER ORGAN INVOLVEMENT (HCC): ICD-10-CM

## 2022-08-24 PROCEDURE — 99214 OFFICE O/P EST MOD 30 MIN: CPT | Performed by: NURSE PRACTITIONER

## 2022-08-24 SDOH — ECONOMIC STABILITY: TRANSPORTATION INSECURITY
IN THE PAST 12 MONTHS, HAS THE LACK OF TRANSPORTATION KEPT YOU FROM MEDICAL APPOINTMENTS OR FROM GETTING MEDICATIONS?: NO

## 2022-08-24 SDOH — ECONOMIC STABILITY: FOOD INSECURITY: WITHIN THE PAST 12 MONTHS, YOU WORRIED THAT YOUR FOOD WOULD RUN OUT BEFORE YOU GOT MONEY TO BUY MORE.: NEVER TRUE

## 2022-08-24 SDOH — ECONOMIC STABILITY: FOOD INSECURITY: WITHIN THE PAST 12 MONTHS, THE FOOD YOU BOUGHT JUST DIDN'T LAST AND YOU DIDN'T HAVE MONEY TO GET MORE.: NEVER TRUE

## 2022-08-24 SDOH — ECONOMIC STABILITY: TRANSPORTATION INSECURITY
IN THE PAST 12 MONTHS, HAS LACK OF TRANSPORTATION KEPT YOU FROM MEETINGS, WORK, OR FROM GETTING THINGS NEEDED FOR DAILY LIVING?: NO

## 2022-08-24 ASSESSMENT — ENCOUNTER SYMPTOMS
COUGH: 0
SHORTNESS OF BREATH: 0

## 2022-08-24 ASSESSMENT — SOCIAL DETERMINANTS OF HEALTH (SDOH): HOW HARD IS IT FOR YOU TO PAY FOR THE VERY BASICS LIKE FOOD, HOUSING, MEDICAL CARE, AND HEATING?: NOT HARD AT ALL

## 2022-08-24 NOTE — PROGRESS NOTES
Visit Information    Have you changed or started any medications since your last visit including any over-the-counter medicines, vitamins, or herbal medicines? no   Are you having any side effects from any of your medications? -  no  Have you stopped taking any of your medications? Is so, why? -  no    Have you seen any other physician or provider since your last visit? No  Have you had any other diagnostic tests since your last visit? No  Have you been seen in the emergency room and/or had an admission to a hospital since we last saw you? No  Have you had your routine dental cleaning in the past 6 months? no    Have you activated your [employer] account? If not, what are your barriers?  Yes     Patient Care Team:  ORVILLE Arriola CNP as PCP - General (Certified Nurse Practitioner)  ORVILLE Arriola CNP as PCP - [facility]    Medical History Review  Past Medical, Family, and Social History reviewed and does not contribute to the patient presenting condition    Health Maintenance   Topic Date Due    Lipids  09/13/2019    Colorectal Cancer Screen  01/15/2021    COVID-19 Vaccine (3 - Pfizer risk series) 10/22/2021    DTaP/Tdap/Td vaccine (1 - Tdap) 05/18/2023 (Originally 7/14/1987)    Shingles vaccine (1 of 2) 05/18/2023 (Originally 7/14/1987)    Pneumococcal 0-64 years Vaccine (1 - PCV) 05/18/2023 (Originally 7/14/1974)    Hepatitis C screen  05/18/2023 (Originally 7/14/1986)    HIV screen  05/18/2023 (Originally 7/14/1983)    Flu vaccine (1) 09/01/2022    Breast cancer screen  12/02/2022    A1C test (Diabetic or Prediabetic)  04/27/2023    Depression Screen  04/27/2023    Hepatitis A vaccine  Aged Out    Hepatitis B vaccine  Aged Out    Hib vaccine  Aged Out    Meningococcal (ACWY) vaccine  Aged Out

## 2022-08-24 NOTE — PROGRESS NOTES
Gregorio Calderon (:  1970) is a 45 y.o. female,Established patient, here for evaluation of the following chief complaint(s):  Follow-up         ASSESSMENT/PLAN:  1. Essential hypertension  2. Screening for hyperlipidemia  3. Other systemic lupus erythematosus with other organ involvement ([facility] [address]  4. Encounter for screening mammogram for breast cancer  -     GWENDOLYN DIGITAL SCREEN W OR WO CAD BILATERAL; Future  5. Screening for malignant neoplasm of colon  -     FIT-DNA (Cologuard)      No follow-ups on file. Subjective   SUBJECTIVE/OBJECTIVE:  HPI  Doesn't need norco refill at this time, using sparingly. Review of Systems   Constitutional:  Negative for chills and fever. Respiratory:  Negative for cough and shortness of breath. Cardiovascular:  Negative for chest pain, palpitations and leg swelling. Objective   Vitals:    22 1559   BP: 130/82   Pulse: 60   Temp: 96.8 °F (36 °C)   SpO2: 100%     Wt Readings from Last 3 Encounters:   22 181 lb (82.1 kg)   22 175 lb (79.4 kg)   22 173 lb 6 oz (78.6 kg)       Physical Exam  Constitutional:       Appearance: She is well-developed. HENT:      Right Ear: External ear normal.      Left Ear: External ear normal.      Nose: Nose normal.   Cardiovascular:      Rate and Rhythm: Normal rate and regular rhythm. Heart sounds: Normal heart sounds, S1 normal and S2 normal.   Pulmonary:      Effort: Pulmonary effort is normal. No respiratory distress. Breath sounds: Normal breath sounds. Musculoskeletal:         General: No deformity. Normal range of motion. Cervical back: Full passive range of motion without pain and normal range of motion. Skin:     General: Skin is warm and dry. Neurological:      Mental Status: She is alert and oriented to person, place, and time. An electronic signature was used to authenticate this note.     --Tomasz Zamorano, APRN - CNP

## 2022-08-26 DIAGNOSIS — E78.2 MIXED HYPERLIPIDEMIA: ICD-10-CM

## 2022-08-26 DIAGNOSIS — I10 ESSENTIAL HYPERTENSION: ICD-10-CM

## 2022-08-26 DIAGNOSIS — M62.830 BACK SPASM: ICD-10-CM

## 2022-08-27 NOTE — TELEPHONE ENCOUNTER
Problem with medical care compliance     Lumbar back sprain     Headache     Groin strain     Unstable gait     Sprain, lumbosacral     Constipation by delayed colonic transit     Non morbid obesity due to excess calories     Mixed hyperlipidemia     Arthralgia of multiple joints     Hx of fracture of ankle     Hypothyroidism     Systemic lupus erythematosus (HCC)     DJD (degenerative joint disease), lumbosacral     Near syncope     Fibromyalgia     Neuropathy     Osteoarthritis of right hip

## 2022-08-27 NOTE — TELEPHONE ENCOUNTER
Health Maintenance   Topic Date Due    Lipids  09/13/2019    Colorectal Cancer Screen  01/15/2021    COVID-19 Vaccine (3 - Pfizer risk series) 10/22/2021    DTaP/Tdap/Td vaccine (1 - Tdap) 05/18/2023 (Originally 7/14/1987)    Shingles vaccine (1 of 2) 05/18/2023 (Originally 7/14/1987)    Pneumococcal 0-64 years Vaccine (1 - PCV) 05/18/2023 (Originally 7/14/1974)    Hepatitis C screen  05/18/2023 (Originally 7/14/1986)    HIV screen  05/18/2023 (Originally 7/14/1983)    Flu vaccine (1) 09/01/2022    Breast cancer screen  12/02/2022    A1C test (Diabetic or Prediabetic)  04/27/2023    Depression Screen  04/27/2023    Hepatitis A vaccine  Aged Out    Hepatitis B vaccine  Aged Out    Hib vaccine  Aged Out    Meningococcal (ACWY) vaccine  Aged Out             (applicable per patient's age: Cancer Screenings, Depression Screening, Fall Risk Screening, Immunizations)    Hemoglobin A1C (%)   Date Value   04/27/2022 6.0   08/04/2021 6.5   01/15/2020 6.0     LDL Cholesterol (mg/dL)   Date Value   03/09/2017 112     LDL Calculated (mg/dL)   Date Value   09/13/2018 86     AST (U/L)   Date Value   03/22/2022 14     ALT (U/L)   Date Value   03/22/2022 16     BUN (mg/dL)   Date Value   04/11/2022 27 (H)      (goal A1C is < 7)   (goal LDL is <100) need 30-50% reduction from baseline     BP Readings from Last 3 Encounters:   08/24/22 130/82   05/18/22 (!) 137/95   04/27/22 120/81    (goal /80)      All Future Testing planned in [facility]:  Lab Frequency Next Occurrence   TSH Once 04/27/2022   GWENDOLYN DIGITAL SCREEN W OR WO CAD BILATERAL Once 08/24/2022       Next Visit Date:  Future Appointments   Date Time Provider Steph Vizcarra   11/30/2022  4:30 PM ORVILLE Whitfield - CNP [facility]            Patient Active Problem List:     Anemia     Fatigue     Uterine fibroid     Obesity     Dyspepsia     DUB (dysfunctional uterine bleeding)     Dysmenorrhea     Anxiety     Stress     Uncontrolled hypertension Problem with medical care compliance     Lumbar back sprain     Headache     Groin strain     Unstable gait     Sprain, lumbosacral     Constipation by delayed colonic transit     Non morbid obesity due to excess calories     Mixed hyperlipidemia     Arthralgia of multiple joints     Hx of fracture of ankle     Hypothyroidism     Systemic lupus erythematosus (HCC)     DJD (degenerative joint disease), lumbosacral     Near syncope     Fibromyalgia     Neuropathy     Osteoarthritis of right hip

## 2022-08-30 RX ORDER — VERAPAMIL HYDROCHLORIDE 360 MG/1
CAPSULE, DELAYED RELEASE PELLETS ORAL
Qty: 90 CAPSULE | Refills: 1 | Status: SHIPPED | OUTPATIENT
Start: 2022-08-30

## 2022-08-30 RX ORDER — CYCLOBENZAPRINE HCL 10 MG
TABLET ORAL
Qty: 30 TABLET | Refills: 1 | Status: SHIPPED | OUTPATIENT
Start: 2022-08-30 | End: 2022-10-24

## 2022-08-30 RX ORDER — SIMVASTATIN 20 MG
20 TABLET ORAL EVERY EVENING
Qty: 90 TABLET | Refills: 1 | Status: SHIPPED | OUTPATIENT
Start: 2022-08-30

## 2022-09-01 ENCOUNTER — TELEPHONE (OUTPATIENT)
Dept: PRIMARY CARE CLINIC | Age: 52
End: 2022-09-01

## 2022-09-01 DIAGNOSIS — M32.19 OTHER SYSTEMIC LUPUS ERYTHEMATOSUS WITH OTHER ORGAN INVOLVEMENT (HCC): ICD-10-CM

## 2022-09-01 RX ORDER — HYDROCODONE BITARTRATE AND ACETAMINOPHEN 5; 325 MG/1; MG/1
1 TABLET ORAL 3 TIMES DAILY PRN
Qty: 90 TABLET | Refills: 0 | Status: SHIPPED | OUTPATIENT
Start: 2022-09-01 | End: 2022-10-01

## 2022-09-01 NOTE — TELEPHONE ENCOUNTER
Writer spoke with patient, Informed of refill. Voiced understanding. No questions asked at time of call.

## 2022-09-01 NOTE — TELEPHONE ENCOUNTER
Pt called in requesting refill on HYDROCODONE 5-325MG.         Health Maintenance   Topic Date Due    Lipids  09/13/2019    Colorectal Cancer Screen  01/15/2021    COVID-19 Vaccine (3 - Pfizer risk series) 10/22/2021    Flu vaccine (1) Never done    DTaP/Tdap/Td vaccine (1 - Tdap) 05/18/2023 (Originally 7/14/1987)    Shingles vaccine (1 of 2) 05/18/2023 (Originally 7/14/1987)    Pneumococcal 0-64 years Vaccine (1 - PCV) 05/18/2023 (Originally 7/14/1974)    Hepatitis C screen  05/18/2023 (Originally 7/14/1986)    HIV screen  05/18/2023 (Originally 7/14/1983)    Breast cancer screen  12/02/2022    A1C test (Diabetic or Prediabetic)  04/27/2023    Depression Screen  04/27/2023    Hepatitis A vaccine  Aged Out    Hepatitis B vaccine  Aged Out    Hib vaccine  Aged Out    Meningococcal (ACWY) vaccine  Aged Out             (applicable per patient's age: Cancer Screenings, Depression Screening, Fall Risk Screening, Immunizations)    Hemoglobin A1C (%)   Date Value   04/27/2022 6.0   08/04/2021 6.5   01/15/2020 6.0     LDL Cholesterol (mg/dL)   Date Value   03/09/2017 112     LDL Calculated (mg/dL)   Date Value   09/13/2018 86     AST (U/L)   Date Value   03/22/2022 14     ALT (U/L)   Date Value   03/22/2022 16     BUN (mg/dL)   Date Value   04/11/2022 27 (H)      (goal A1C is < 7)   (goal LDL is <100) need 30-50% reduction from baseline     BP Readings from Last 3 Encounters:   08/24/22 130/82   05/18/22 (!) 137/95   04/27/22 120/81    (goal /80)      All Future Testing planned in [facility]:  Lab Frequency Next Occurrence   TSH Once 04/27/2022   GWENDOLYN DIGITAL SCREEN W OR WO CAD BILATERAL Once 08/24/2022       Next Visit Date:  Future Appointments   Date Time Provider Steph Vizcarra   11/30/2022  4:30 PM Blanche Johnson APRN - CNP [facility]            Patient Active Problem List:     Anemia     Fatigue     Uterine fibroid     Obesity     Dyspepsia     DUB (dysfunctional uterine bleeding) Dysmenorrhea     Anxiety     Stress     Uncontrolled hypertension     Problem with medical care compliance     Lumbar back sprain     Headache     Groin strain     Unstable gait     Sprain, lumbosacral     Constipation by delayed colonic transit     Non morbid obesity due to excess calories     Mixed hyperlipidemia     Arthralgia of multiple joints     Hx of fracture of ankle     Hypothyroidism     Systemic lupus erythematosus (HCC)     DJD (degenerative joint disease), lumbosacral     Near syncope     Fibromyalgia     Neuropathy     Osteoarthritis of right hip

## 2022-09-26 LAB — NONINV COLON CA DNA+OCC BLD SCRN STL QL: NEGATIVE

## 2022-09-27 DIAGNOSIS — I10 ESSENTIAL HYPERTENSION: ICD-10-CM

## 2022-09-27 RX ORDER — HYDROCHLOROTHIAZIDE 12.5 MG/1
12.5 TABLET ORAL DAILY
Qty: 90 TABLET | Refills: 1 | Status: SHIPPED | OUTPATIENT
Start: 2022-09-27

## 2022-10-17 DIAGNOSIS — M32.19 OTHER SYSTEMIC LUPUS ERYTHEMATOSUS WITH OTHER ORGAN INVOLVEMENT (HCC): ICD-10-CM

## 2022-10-17 RX ORDER — HYDROCODONE BITARTRATE AND ACETAMINOPHEN 5; 325 MG/1; MG/1
1 TABLET ORAL 3 TIMES DAILY PRN
Qty: 90 TABLET | Refills: 0 | Status: SHIPPED | OUTPATIENT
Start: 2022-10-17 | End: 2022-11-16

## 2022-10-17 NOTE — TELEPHONE ENCOUNTER
Patient is requesting med refill has future appt on 11/30/22 with provider.     Health Maintenance   Topic Date Due    Lipids  09/13/2019    COVID-19 Vaccine (3 - Pfizer risk series) 10/22/2021    Flu vaccine (1) Never done    DTaP/Tdap/Td vaccine (1 - Tdap) 05/18/2023 (Originally 7/14/1987)    Shingles vaccine (1 of 2) 05/18/2023 (Originally 7/14/1987)    Pneumococcal 0-64 years Vaccine (1 - PCV) 05/18/2023 (Originally 7/14/1974)    Hepatitis C screen  05/18/2023 (Originally 7/14/1986)    HIV screen  05/18/2023 (Originally 7/14/1983)    Breast cancer screen  12/02/2022    A1C test (Diabetic or Prediabetic)  04/27/2023    Depression Screen  04/27/2023    Colorectal Cancer Screen  09/21/2025    Hepatitis A vaccine  Aged Out    Hib vaccine  Aged Out    Meningococcal (ACWY) vaccine  Aged Out             (applicable per patient's age: Cancer Screenings, Depression Screening, Fall Risk Screening, Immunizations)    Hemoglobin A1C (%)   Date Value   04/27/2022 6.0   08/04/2021 6.5   01/15/2020 6.0     LDL Cholesterol (mg/dL)   Date Value   03/09/2017 112     LDL Calculated (mg/dL)   Date Value   09/13/2018 86     AST (U/L)   Date Value   03/22/2022 14     ALT (U/L)   Date Value   03/22/2022 16     BUN (mg/dL)   Date Value   04/11/2022 27 (H)      (goal A1C is < 7)   (goal LDL is <100) need 30-50% reduction from baseline     BP Readings from Last 3 Encounters:   08/24/22 130/82   05/18/22 (!) 137/95   04/27/22 120/81    (goal /80)      All Future Testing planned in [facility]:  Lab Frequency Next Occurrence   TSH Once 04/27/2022   GWENDOLYN DIGITAL SCREEN W OR WO CAD BILATERAL Once 08/24/2022       Next Visit Date:  Future Appointments   Date Time Provider Steph Vizcarra   11/30/2022  4:30 PM Gearmadeline Navarro, APRN - CNP [facility]            Patient Active Problem List:     Anemia     Fatigue     Uterine fibroid     Obesity     Dyspepsia     DUB (dysfunctional uterine bleeding)     Dysmenorrhea     Anxiety Stress     Uncontrolled hypertension     Problem with medical care compliance     Lumbar back sprain     Headache     Groin strain     Unstable gait     Sprain, lumbosacral     Constipation by delayed colonic transit     Non morbid obesity due to excess calories     Mixed hyperlipidemia     Arthralgia of multiple joints     Hx of fracture of ankle     Hypothyroidism     Systemic lupus erythematosus (HCC)     DJD (degenerative joint disease), lumbosacral     Near syncope     Fibromyalgia     Neuropathy     Osteoarthritis of right hip

## 2022-10-21 DIAGNOSIS — M62.830 BACK SPASM: ICD-10-CM

## 2022-10-21 NOTE — TELEPHONE ENCOUNTER
Health Maintenance   Topic Date Due    Lipids  09/13/2019    COVID-19 Vaccine (3 - Pfizer risk series) 10/22/2021    Flu vaccine (1) Never done    DTaP/Tdap/Td vaccine (1 - Tdap) 05/18/2023 (Originally 7/14/1987)    Shingles vaccine (1 of 2) 05/18/2023 (Originally 7/14/1987)    Pneumococcal 0-64 years Vaccine (1 - PCV) 05/18/2023 (Originally 7/14/1974)    Hepatitis C screen  05/18/2023 (Originally 7/14/1986)    HIV screen  05/18/2023 (Originally 7/14/1983)    Breast cancer screen  12/02/2022    A1C test (Diabetic or Prediabetic)  04/27/2023    Depression Screen  04/27/2023    Colorectal Cancer Screen  09/21/2025    Hepatitis A vaccine  Aged Out    Hib vaccine  Aged Out    Meningococcal (ACWY) vaccine  Aged Out             (applicable per patient's age: Cancer Screenings, Depression Screening, Fall Risk Screening, Immunizations)    Hemoglobin A1C (%)   Date Value   04/27/2022 6.0   08/04/2021 6.5   01/15/2020 6.0     LDL Cholesterol (mg/dL)   Date Value   03/09/2017 112     LDL Calculated (mg/dL)   Date Value   09/13/2018 86     AST (U/L)   Date Value   03/22/2022 14     ALT (U/L)   Date Value   03/22/2022 16     BUN (mg/dL)   Date Value   04/11/2022 27 (H)      (goal A1C is < 7)   (goal LDL is <100) need 30-50% reduction from baseline     BP Readings from Last 3 Encounters:   08/24/22 130/82   05/18/22 (!) 137/95   04/27/22 120/81    (goal /80)      All Future Testing planned in [facility]:  Lab Frequency Next Occurrence   TSH Once 04/27/2022   GWENDOLYN DIGITAL SCREEN W OR WO CAD BILATERAL Once 08/24/2022       Next Visit Date:  Future Appointments   Date Time Provider Steph Vizcarra   11/30/2022  4:30 PM ORVILLE Russell - CNP [facility]            Patient Active Problem List:     Anemia     Fatigue     Uterine fibroid     Obesity     Dyspepsia     DUB (dysfunctional uterine bleeding)     Dysmenorrhea     Anxiety     Stress     Uncontrolled hypertension     Problem with medical care compliance     Lumbar back sprain     Headache     Groin strain     Unstable gait     Sprain, lumbosacral     Constipation by delayed colonic transit     Non morbid obesity due to excess calories     Mixed hyperlipidemia     Arthralgia of multiple joints     Hx of fracture of ankle     Hypothyroidism     Systemic lupus erythematosus (HCC)     DJD (degenerative joint disease), lumbosacral     Near syncope     Fibromyalgia     Neuropathy     Osteoarthritis of right hip

## 2022-10-24 RX ORDER — CYCLOBENZAPRINE HCL 10 MG
TABLET ORAL
Qty: 30 TABLET | Refills: 1 | Status: SHIPPED | OUTPATIENT
Start: 2022-10-24

## 2022-12-14 DIAGNOSIS — M47.817 DJD (DEGENERATIVE JOINT DISEASE), LUMBOSACRAL: ICD-10-CM

## 2022-12-14 DIAGNOSIS — S76.211D INGUINAL STRAIN, RIGHT, SUBSEQUENT ENCOUNTER: ICD-10-CM

## 2022-12-14 NOTE — TELEPHONE ENCOUNTER
Jeannette Whiting is calling to request a refill on the following medication(s):    Medication Request:  Requested Prescriptions     Pending Prescriptions Disp Refills    HYDROcodone-acetaminophen (NORCO) 5-325 MG per tablet 20 tablet 0     Sig: Take 1 tablet by mouth 2 times daily as needed for Pain.        Last Visit Date (If Applicable):  8/93/7437    Next Visit Date:    12/21/2022

## 2022-12-16 RX ORDER — HYDROCODONE BITARTRATE AND ACETAMINOPHEN 5; 325 MG/1; MG/1
1 TABLET ORAL 2 TIMES DAILY PRN
Qty: 20 TABLET | Refills: 0 | Status: SHIPPED | OUTPATIENT
Start: 2022-12-16 | End: 2022-12-23

## 2022-12-21 ENCOUNTER — OFFICE VISIT (OUTPATIENT)
Dept: PRIMARY CARE CLINIC | Age: 52
End: 2022-12-21

## 2022-12-21 VITALS — OXYGEN SATURATION: 97 % | DIASTOLIC BLOOD PRESSURE: 80 MMHG | SYSTOLIC BLOOD PRESSURE: 130 MMHG | HEART RATE: 92 BPM

## 2022-12-21 DIAGNOSIS — M32.19 OTHER SYSTEMIC LUPUS ERYTHEMATOSUS WITH OTHER ORGAN INVOLVEMENT (HCC): Primary | ICD-10-CM

## 2022-12-21 DIAGNOSIS — I10 ESSENTIAL HYPERTENSION: ICD-10-CM

## 2022-12-21 DIAGNOSIS — E78.2 MIXED HYPERLIPIDEMIA: ICD-10-CM

## 2022-12-21 DIAGNOSIS — M47.817 DJD (DEGENERATIVE JOINT DISEASE), LUMBOSACRAL: ICD-10-CM

## 2022-12-21 DIAGNOSIS — M62.830 BACK SPASM: ICD-10-CM

## 2022-12-21 DIAGNOSIS — E03.9 HYPOTHYROIDISM, UNSPECIFIED TYPE: ICD-10-CM

## 2022-12-21 RX ORDER — CYCLOBENZAPRINE HCL 10 MG
TABLET ORAL
Qty: 30 TABLET | Refills: 1 | Status: SHIPPED | OUTPATIENT
Start: 2022-12-21

## 2022-12-21 RX ORDER — METOPROLOL TARTRATE 75 MG/1
TABLET, FILM COATED ORAL
Qty: 180 TABLET | Refills: 1 | Status: SHIPPED | OUTPATIENT
Start: 2022-12-21

## 2022-12-21 RX ORDER — SIMVASTATIN 20 MG
20 TABLET ORAL EVERY EVENING
Qty: 90 TABLET | Refills: 1 | Status: SHIPPED | OUTPATIENT
Start: 2022-12-21

## 2022-12-21 RX ORDER — LEVOTHYROXINE SODIUM 112 UG/1
TABLET ORAL
Qty: 90 TABLET | Refills: 1 | Status: SHIPPED | OUTPATIENT
Start: 2022-12-21

## 2022-12-21 RX ORDER — HYDROCODONE BITARTRATE AND ACETAMINOPHEN 5; 325 MG/1; MG/1
1 TABLET ORAL 2 TIMES DAILY PRN
Qty: 60 TABLET | Refills: 0 | Status: SHIPPED | OUTPATIENT
Start: 2022-12-21 | End: 2023-01-20

## 2022-12-21 RX ORDER — VERAPAMIL HYDROCHLORIDE 360 MG/1
CAPSULE, DELAYED RELEASE PELLETS ORAL
Qty: 90 CAPSULE | Refills: 1 | Status: SHIPPED | OUTPATIENT
Start: 2022-12-21

## 2022-12-21 ASSESSMENT — ENCOUNTER SYMPTOMS
COUGH: 0
SHORTNESS OF BREATH: 0

## 2022-12-21 NOTE — PROGRESS NOTES
Leana Navas (:  1970) is a 45 y.o. female,Established patient, here for evaluation of the following chief complaint(s):  Hypertension and 3 Month Follow-Up      ASSESSMENT/PLAN:  1. Other systemic lupus erythematosus with other organ involvement (HCC)  -     HYDROcodone-acetaminophen (NORCO) 5-325 MG per tablet; Take 1 tablet by mouth 2 times daily as needed for Pain for up to 30 days. Max Daily Amount: 2 tablets, Disp-60 tablet, R-0Normal  2. Essential hypertension  -     verapamil (VERELAN) 360 MG extended release capsule; TAKE 1 CAPSULE BY MOUTH EVERY NIGHT, Disp-90 capsule, R-1Normal  -     Metoprolol Tartrate 75 MG TABS; TAKE 1 TABLET BY MOUTH TWICE DAILY, Disp-180 tablet, R-1Normal  3. Mixed hyperlipidemia  -     simvastatin (ZOCOR) 20 MG tablet; Take 1 tablet by mouth every evening, Disp-90 tablet, R-1Normal  -     Lipid Panel; Future  4. Hypothyroidism, unspecified type  -     levothyroxine (EUTHYROX) 112 MCG tablet; Take 1 tablet by mouth once daily, Disp-90 tablet, R-1Normal  5. Back spasm  -     cyclobenzaprine (FLEXERIL) 10 MG tablet; TAKE 1 TABLET BY MOUTH EVERY NIGHT AT BEDTIME AS NEEDED FOR MUSCLE SPASMS, Disp-30 tablet, R-1Normal  6. DJD (degenerative joint disease), lumbosacral  -     HYDROcodone-acetaminophen (NORCO) 5-325 MG per tablet; Take 1 tablet by mouth 2 times daily as needed for Pain for up to 30 days. Max Daily Amount: 2 tablets, Disp-60 tablet, R-0Normal      No follow-ups on file. Subjective   SUBJECTIVE/OBJECTIVE:  Hypertension  Pertinent negatives include no chest pain, palpitations or shortness of breath. She is using 1-2 norco a day for her lupus flares- flares are mostly in her knees, occasionally full body- her rheumatologist left and the remaining providers will not provide for her. She does report her pain has been good lately, she is not sure what changed, normally in daily pain. Only taking norco when she needs them. Reminded to get tsh checked. Review of Systems   Constitutional:  Negative for chills and fever. Respiratory:  Negative for cough and shortness of breath. Cardiovascular:  Negative for chest pain, palpitations and leg swelling. Objective   Vitals:    12/21/22 1616   BP: 130/80   Pulse: 92   SpO2: 97%     Wt Readings from Last 3 Encounters:   08/24/22 181 lb (82.1 kg)   05/18/22 175 lb (79.4 kg)   04/27/22 173 lb 6 oz (78.6 kg)       Physical Exam  Constitutional:       Appearance: She is well-developed. HENT:      Right Ear: External ear normal.      Left Ear: External ear normal.      Nose: Nose normal.   Cardiovascular:      Rate and Rhythm: Normal rate and regular rhythm. Heart sounds: Normal heart sounds, S1 normal and S2 normal.   Pulmonary:      Effort: Pulmonary effort is normal. No respiratory distress. Breath sounds: Normal breath sounds. Musculoskeletal:         General: No deformity. Normal range of motion. Cervical back: Full passive range of motion without pain and normal range of motion. Skin:     General: Skin is warm and dry. Neurological:      Mental Status: She is alert and oriented to person, place, and time. An electronic signature was used to authenticate this note.     --Clarisse Lainez, ORVILLE - CNP

## 2023-02-08 DIAGNOSIS — I10 ESSENTIAL HYPERTENSION: ICD-10-CM

## 2023-02-09 RX ORDER — HYDROCHLOROTHIAZIDE 12.5 MG/1
12.5 TABLET ORAL DAILY
Qty: 90 TABLET | Refills: 1 | Status: SHIPPED | OUTPATIENT
Start: 2023-02-09

## 2023-02-24 DIAGNOSIS — M32.19 OTHER SYSTEMIC LUPUS ERYTHEMATOSUS WITH OTHER ORGAN INVOLVEMENT (HCC): ICD-10-CM

## 2023-02-24 DIAGNOSIS — M47.817 DJD (DEGENERATIVE JOINT DISEASE), LUMBOSACRAL: ICD-10-CM

## 2023-02-24 RX ORDER — HYDROCODONE BITARTRATE AND ACETAMINOPHEN 5; 325 MG/1; MG/1
1 TABLET ORAL 2 TIMES DAILY PRN
Qty: 60 TABLET | Refills: 0 | Status: SHIPPED | OUTPATIENT
Start: 2023-02-24 | End: 2023-03-26

## 2023-03-01 DIAGNOSIS — M62.830 BACK SPASM: ICD-10-CM

## 2023-03-02 RX ORDER — CYCLOBENZAPRINE HCL 10 MG
TABLET ORAL
Qty: 30 TABLET | Refills: 2 | Status: SHIPPED | OUTPATIENT
Start: 2023-03-02

## 2023-03-17 DIAGNOSIS — M32.19 OTHER SYSTEMIC LUPUS ERYTHEMATOSUS WITH OTHER ORGAN INVOLVEMENT (HCC): ICD-10-CM

## 2023-03-17 DIAGNOSIS — E03.9 HYPOTHYROIDISM, UNSPECIFIED TYPE: ICD-10-CM

## 2023-03-18 ENCOUNTER — HOSPITAL ENCOUNTER (OUTPATIENT)
Age: 53
Setting detail: SPECIMEN
Discharge: HOME | End: 2023-03-18
Payer: COMMERCIAL

## 2023-03-18 DIAGNOSIS — E78.2 MIXED HYPERLIPIDEMIA: ICD-10-CM

## 2023-03-18 DIAGNOSIS — E03.9 HYPOTHYROIDISM, UNSPECIFIED TYPE: ICD-10-CM

## 2023-03-18 LAB
CHOLEST SERPL-MCNC: 192 MG/DL
CHOLESTEROL/HDL RATIO: 3
HDLC SERPL-MCNC: 65 MG/DL
LDLC SERPL CALC-MCNC: 85 MG/DL (ref 0–130)
TRIGL SERPL-MCNC: 212 MG/DL
TSH SERPL-ACNC: 0.56 UIU/ML (ref 0.3–5)

## 2023-03-18 PROCEDURE — 36415 COLL VENOUS BLD VENIPUNCTURE: CPT

## 2023-03-18 PROCEDURE — 84443 ASSAY THYROID STIM HORMONE: CPT

## 2023-03-18 PROCEDURE — 80061 LIPID PANEL: CPT

## 2023-03-21 RX ORDER — LEVOTHYROXINE SODIUM 112 UG/1
TABLET ORAL
Qty: 30 TABLET | Refills: 1 | Status: SHIPPED | OUTPATIENT
Start: 2023-03-21 | End: 2023-03-22 | Stop reason: SDUPTHER

## 2023-03-22 ENCOUNTER — OFFICE VISIT (OUTPATIENT)
Dept: PRIMARY CARE CLINIC | Age: 53
End: 2023-03-22
Payer: COMMERCIAL

## 2023-03-22 VITALS
DIASTOLIC BLOOD PRESSURE: 97 MMHG | WEIGHT: 182.4 LBS | SYSTOLIC BLOOD PRESSURE: 142 MMHG | BODY MASS INDEX: 33.8 KG/M2 | HEART RATE: 91 BPM | OXYGEN SATURATION: 94 %

## 2023-03-22 DIAGNOSIS — E03.9 HYPOTHYROIDISM, UNSPECIFIED TYPE: ICD-10-CM

## 2023-03-22 DIAGNOSIS — M32.19 OTHER SYSTEMIC LUPUS ERYTHEMATOSUS WITH OTHER ORGAN INVOLVEMENT (HCC): ICD-10-CM

## 2023-03-22 DIAGNOSIS — F11.20 OPIOID DEPENDENCE WITH CURRENT USE (HCC): Primary | ICD-10-CM

## 2023-03-22 DIAGNOSIS — I10 ESSENTIAL HYPERTENSION: ICD-10-CM

## 2023-03-22 DIAGNOSIS — M47.817 DJD (DEGENERATIVE JOINT DISEASE), LUMBOSACRAL: ICD-10-CM

## 2023-03-22 DIAGNOSIS — E78.2 MIXED HYPERLIPIDEMIA: ICD-10-CM

## 2023-03-22 PROCEDURE — 3074F SYST BP LT 130 MM HG: CPT | Performed by: NURSE PRACTITIONER

## 2023-03-22 PROCEDURE — 3078F DIAST BP <80 MM HG: CPT | Performed by: NURSE PRACTITIONER

## 2023-03-22 PROCEDURE — 99214 OFFICE O/P EST MOD 30 MIN: CPT | Performed by: NURSE PRACTITIONER

## 2023-03-22 RX ORDER — VERAPAMIL HYDROCHLORIDE 360 MG/1
CAPSULE, DELAYED RELEASE PELLETS ORAL
Qty: 90 CAPSULE | Refills: 1 | Status: SHIPPED | OUTPATIENT
Start: 2023-03-22

## 2023-03-22 RX ORDER — LEVOTHYROXINE SODIUM 112 UG/1
112 TABLET ORAL DAILY
Qty: 30 TABLET | Refills: 11 | Status: SHIPPED | OUTPATIENT
Start: 2023-03-22

## 2023-03-22 RX ORDER — METOPROLOL TARTRATE 75 MG/1
TABLET, FILM COATED ORAL
Qty: 180 TABLET | Refills: 1 | Status: SHIPPED | OUTPATIENT
Start: 2023-03-22

## 2023-03-22 RX ORDER — FERROUS SULFATE 325(65) MG
1 TABLET ORAL DAILY
COMMUNITY
Start: 2023-02-08

## 2023-03-22 RX ORDER — SIMVASTATIN 20 MG
20 TABLET ORAL EVERY EVENING
Qty: 90 TABLET | Refills: 1 | Status: SHIPPED | OUTPATIENT
Start: 2023-03-22

## 2023-03-22 RX ORDER — LEVOTHYROXINE SODIUM 112 UG/1
112 TABLET ORAL DAILY
Qty: 90 TABLET | OUTPATIENT
Start: 2023-03-22

## 2023-03-22 RX ORDER — HYDROCODONE BITARTRATE AND ACETAMINOPHEN 5; 325 MG/1; MG/1
1 TABLET ORAL EVERY 8 HOURS PRN
Qty: 90 TABLET | Refills: 0 | Status: SHIPPED | OUTPATIENT
Start: 2023-04-05 | End: 2023-05-05

## 2023-03-22 SDOH — ECONOMIC STABILITY: INCOME INSECURITY: HOW HARD IS IT FOR YOU TO PAY FOR THE VERY BASICS LIKE FOOD, HOUSING, MEDICAL CARE, AND HEATING?: NOT VERY HARD

## 2023-03-22 SDOH — ECONOMIC STABILITY: FOOD INSECURITY: WITHIN THE PAST 12 MONTHS, THE FOOD YOU BOUGHT JUST DIDN'T LAST AND YOU DIDN'T HAVE MONEY TO GET MORE.: NEVER TRUE

## 2023-03-22 SDOH — ECONOMIC STABILITY: FOOD INSECURITY: WITHIN THE PAST 12 MONTHS, YOU WORRIED THAT YOUR FOOD WOULD RUN OUT BEFORE YOU GOT MONEY TO BUY MORE.: NEVER TRUE

## 2023-03-22 SDOH — ECONOMIC STABILITY: HOUSING INSECURITY
IN THE LAST 12 MONTHS, WAS THERE A TIME WHEN YOU DID NOT HAVE A STEADY PLACE TO SLEEP OR SLEPT IN A SHELTER (INCLUDING NOW)?: NO

## 2023-03-22 ASSESSMENT — PATIENT HEALTH QUESTIONNAIRE - PHQ9
SUM OF ALL RESPONSES TO PHQ QUESTIONS 1-9: 0
2. FEELING DOWN, DEPRESSED OR HOPELESS: 0
1. LITTLE INTEREST OR PLEASURE IN DOING THINGS: 0
SUM OF ALL RESPONSES TO PHQ QUESTIONS 1-9: 0
SUM OF ALL RESPONSES TO PHQ9 QUESTIONS 1 & 2: 0
SUM OF ALL RESPONSES TO PHQ QUESTIONS 1-9: 0
SUM OF ALL RESPONSES TO PHQ QUESTIONS 1-9: 0

## 2023-03-22 ASSESSMENT — ENCOUNTER SYMPTOMS
SHORTNESS OF BREATH: 0
COUGH: 0

## 2023-04-21 RX ORDER — BACITRACIN ZINC AND POLYMYXIN B SULFATE 500; 10000 [USP'U]/G; [USP'U]/G
OINTMENT OPHTHALMIC
Qty: 3.5 G | Refills: 0 | Status: SHIPPED | OUTPATIENT
Start: 2023-04-21

## 2023-04-24 ENCOUNTER — TELEPHONE (OUTPATIENT)
Dept: PRIMARY CARE CLINIC | Age: 53
End: 2023-04-24

## 2023-04-24 NOTE — TELEPHONE ENCOUNTER
Patient called into the office today and asked if something can be called in for sore throat. States she has had sore throat for about a week and has tried cough drops and theraflu with no relief. I did relay sometimes an appt is needed for this, pt did ask juno ti send a message first to provider.

## 2023-05-21 DIAGNOSIS — E03.9 HYPOTHYROIDISM, UNSPECIFIED TYPE: ICD-10-CM

## 2023-05-23 RX ORDER — LEVOTHYROXINE SODIUM 112 UG/1
112 TABLET ORAL DAILY
Qty: 90 TABLET | Refills: 1 | Status: SHIPPED | OUTPATIENT
Start: 2023-05-23 | End: 2024-05-23

## 2023-06-03 DIAGNOSIS — M62.830 BACK SPASM: ICD-10-CM

## 2023-06-07 DIAGNOSIS — M32.19 OTHER SYSTEMIC LUPUS ERYTHEMATOSUS WITH OTHER ORGAN INVOLVEMENT (HCC): ICD-10-CM

## 2023-06-07 DIAGNOSIS — M47.817 DJD (DEGENERATIVE JOINT DISEASE), LUMBOSACRAL: ICD-10-CM

## 2023-06-07 RX ORDER — HYDROCODONE BITARTRATE AND ACETAMINOPHEN 5; 325 MG/1; MG/1
1 TABLET ORAL EVERY 8 HOURS PRN
Qty: 90 TABLET | Refills: 0 | Status: SHIPPED | OUTPATIENT
Start: 2023-06-07 | End: 2023-07-07

## 2023-06-07 RX ORDER — CYCLOBENZAPRINE HCL 10 MG
TABLET ORAL
Qty: 30 TABLET | Refills: 2 | Status: SHIPPED | OUTPATIENT
Start: 2023-06-07

## 2023-06-07 NOTE — TELEPHONE ENCOUNTER
----- Message from [address] [facility] sent at 6/7/2023  9:45 AM EDT -----  Subject: Refill Request    QUESTIONS  Name of Medication? HYDROcodone-acetaminophen (NORCO) 5-325 MG per tablet  Patient-reported dosage and instructions? 5-325mg, 1 po TID  How many days do you have left? 5  [id number]  Pharmacy phone number (if available)? [phone]  ---------------------------------------------------------------------------  --------------  CALL BACK INFO  What is the best way for the office to contact you? OK to leave message on   voicemail  Preferred Call Back Phone Number? [phone]  ---------------------------------------------------------------------------  --------------  SCRIPT ANSWERS  Relationship to Patient?  Self

## 2023-06-07 NOTE — TELEPHONE ENCOUNTER
Requested Prescriptions     Pending Prescriptions Disp Refills    HYDROcodone-acetaminophen (NORCO) 5-325 MG per tablet 90 tablet 0     Sig: Take 1 tablet by mouth every 8 hours as needed for Pain for up to 30 days.  Max Daily Amount: 3 tablets

## 2023-07-19 ENCOUNTER — HOSPITAL ENCOUNTER (OUTPATIENT)
Age: 53
Setting detail: SPECIMEN
Discharge: HOME | End: 2023-07-19

## 2023-07-19 ENCOUNTER — OFFICE VISIT (OUTPATIENT)
Dept: PRIMARY CARE CLINIC | Age: 53
End: 2023-07-19
Payer: COMMERCIAL

## 2023-07-19 VITALS
HEART RATE: 63 BPM | WEIGHT: 182 LBS | OXYGEN SATURATION: 97 % | HEIGHT: 61 IN | BODY MASS INDEX: 34.36 KG/M2 | DIASTOLIC BLOOD PRESSURE: 82 MMHG | SYSTOLIC BLOOD PRESSURE: 130 MMHG

## 2023-07-19 DIAGNOSIS — Z12.4 PAP SMEAR FOR CERVICAL CANCER SCREENING: ICD-10-CM

## 2023-07-19 DIAGNOSIS — R73.03 PRE-DIABETES: ICD-10-CM

## 2023-07-19 DIAGNOSIS — Z12.31 ENCOUNTER FOR SCREENING MAMMOGRAM FOR BREAST CANCER: Primary | ICD-10-CM

## 2023-07-19 PROCEDURE — 3075F SYST BP GE 130 - 139MM HG: CPT | Performed by: NURSE PRACTITIONER

## 2023-07-19 PROCEDURE — 3079F DIAST BP 80-89 MM HG: CPT | Performed by: NURSE PRACTITIONER

## 2023-07-19 PROCEDURE — 99396 PREV VISIT EST AGE 40-64: CPT | Performed by: NURSE PRACTITIONER

## 2023-07-19 NOTE — PROGRESS NOTES
Patient presents today for routine PAP smear and pelvic exam.  HPI:  Last Rossi?2020 order given today . Last PAP? long time ago. Abnormal PAP's?no. Sexually active?not since 2017. Smoker?no. STD Hx?yes- trich and gonorrhea. Last period?partial hysterectomy 2017 d/t heavy periods- thinks she has a cervix. Regular?na. Frequency?na. Duration?na. Flow?na. Spotting?na. Discharge?no. Birth control?na. G 2 / P 2 / SAB 0 / TAB 0  Family History:      Family History   Problem Relation Age of Onset    High Blood Pressure Mother     High Blood Pressure Father     Diabetes Brother     High Blood Pressure Brother     Breast Cancer Maternal Aunt     High Blood Pressure Maternal Grandmother     High Blood Pressure Maternal Grandfather     High Blood Pressure Paternal Grandmother     Breast Cancer Paternal Grandmother     High Blood Pressure Paternal Grandfather     High Blood Pressure Son      Breast Ca? Dad's mom and maternal aunt. Colon Ca? no.  Uterine Ca? no.  Ovarian Ca? no.  Osteoporosis? no.  Cardiovascular disease? htn. Diabetes? brother. CVA? no.  Thromboembolic disease? no.    Objective:  Physical Exam   Vitals: Blood pressure 130/82, pulse 63, height 5' 1\" (1.549 m), weight 182 lb (82.6 kg), SpO2 97 %, not currently breastfeeding. Constitutional: She is oriented to person, place, and time. She appears well-developed and well-nourished and in no acute distress. Answers all my questions appropriately. Head: Normocephalic and atraumatic. Eyes: EOM are normal. Pupils are equal, round, and reactive to light. Fundi appear benign. Conjunctiva appear normal.  Right Ear: External ear normal. TM is clear  Left Ear: External ear normal. TM is clear  Nose: pink, non-edematous mucosa. No polyps. No septal deviation  Throat: no erythema, tonsillar hypertrophy or exudate. No ulcerations noted. Lips/Teeth/Gums all appear normal.  Neck: Normal range of motion. Neck supple. No tracheal deviation present.  No

## 2023-07-26 LAB — CYTOLOGY REPORT: NORMAL

## 2023-08-08 ENCOUNTER — TELEPHONE (OUTPATIENT)
Dept: PRIMARY CARE CLINIC | Age: 53
End: 2023-08-08

## 2023-08-08 DIAGNOSIS — M47.817 DJD (DEGENERATIVE JOINT DISEASE), LUMBOSACRAL: ICD-10-CM

## 2023-08-08 DIAGNOSIS — M32.19 OTHER SYSTEMIC LUPUS ERYTHEMATOSUS WITH OTHER ORGAN INVOLVEMENT (HCC): ICD-10-CM

## 2023-08-08 RX ORDER — HYDROCODONE BITARTRATE AND ACETAMINOPHEN 5; 325 MG/1; MG/1
1 TABLET ORAL EVERY 8 HOURS PRN
Qty: 90 TABLET | Refills: 0 | Status: SHIPPED | OUTPATIENT
Start: 2023-08-08 | End: 2023-09-07

## 2023-09-05 DIAGNOSIS — M62.830 BACK SPASM: ICD-10-CM

## 2023-09-07 RX ORDER — CYCLOBENZAPRINE HCL 10 MG
TABLET ORAL
Qty: 30 TABLET | Refills: 2 | Status: SHIPPED | OUTPATIENT
Start: 2023-09-07

## 2023-10-02 DIAGNOSIS — M32.19 OTHER SYSTEMIC LUPUS ERYTHEMATOSUS WITH OTHER ORGAN INVOLVEMENT (HCC): ICD-10-CM

## 2023-10-02 DIAGNOSIS — M47.817 DJD (DEGENERATIVE JOINT DISEASE), LUMBOSACRAL: ICD-10-CM

## 2023-10-02 RX ORDER — HYDROCODONE BITARTRATE AND ACETAMINOPHEN 5; 325 MG/1; MG/1
1 TABLET ORAL EVERY 8 HOURS PRN
Qty: 90 TABLET | Refills: 0 | Status: SHIPPED | OUTPATIENT
Start: 2023-10-02 | End: 2023-11-01

## 2023-10-02 NOTE — TELEPHONE ENCOUNTER
LAST VISIT:   7/19/2023     Future Appointments   Date Time Provider [address]   10/4/2023  2:00 PM ORVILLE Cuello - CNP [facility]

## 2023-10-04 ENCOUNTER — OFFICE VISIT (OUTPATIENT)
Dept: PRIMARY CARE CLINIC | Age: 53
End: 2023-10-04
Payer: COMMERCIAL

## 2023-10-04 VITALS
BODY MASS INDEX: 34.96 KG/M2 | SYSTOLIC BLOOD PRESSURE: 132 MMHG | WEIGHT: 185 LBS | OXYGEN SATURATION: 97 % | HEART RATE: 67 BPM | DIASTOLIC BLOOD PRESSURE: 84 MMHG

## 2023-10-04 DIAGNOSIS — M32.19 OTHER SYSTEMIC LUPUS ERYTHEMATOSUS WITH OTHER ORGAN INVOLVEMENT (HCC): Primary | ICD-10-CM

## 2023-10-04 DIAGNOSIS — I10 ESSENTIAL HYPERTENSION: ICD-10-CM

## 2023-10-04 DIAGNOSIS — M47.817 DJD (DEGENERATIVE JOINT DISEASE), LUMBOSACRAL: ICD-10-CM

## 2023-10-04 PROCEDURE — 3078F DIAST BP <80 MM HG: CPT | Performed by: NURSE PRACTITIONER

## 2023-10-04 PROCEDURE — 99213 OFFICE O/P EST LOW 20 MIN: CPT | Performed by: NURSE PRACTITIONER

## 2023-10-04 PROCEDURE — 3074F SYST BP LT 130 MM HG: CPT | Performed by: NURSE PRACTITIONER

## 2023-10-04 RX ORDER — VERAPAMIL HYDROCHLORIDE 360 MG/1
CAPSULE, DELAYED RELEASE PELLETS ORAL
Qty: 90 CAPSULE | Refills: 1 | Status: SHIPPED | OUTPATIENT
Start: 2023-10-04

## 2023-10-04 ASSESSMENT — ENCOUNTER SYMPTOMS
SHORTNESS OF BREATH: 0
COUGH: 0

## 2023-10-04 NOTE — PROGRESS NOTES
Duarte Lemus (:  1970) is a 52 y.o. female,Established patient, here for evaluation of the following chief complaint(s):  No chief complaint on file. ASSESSMENT/PLAN:  1. Other systemic lupus erythematosus with other organ involvement ([address])  2. DJD (degenerative joint disease), lumbosacral  Stable on prn norco, continue medication. Really only tries to use it when she needs it. She always starts with a half, and only takes the other half if she needs it. Rheumatology had been filing her norco but they stopped. Return in about 3 months (around 2024) for med refill. Subjective   SUBJECTIVE/OBJECTIVE:  HPI      Review of Systems   Constitutional:  Negative for chills and fever. Respiratory:  Negative for cough and shortness of breath. Cardiovascular:  Negative for chest pain, palpitations and leg swelling. Objective   Vitals:    10/04/23 1401   BP: 132/84   Pulse: 67   SpO2: 97%     Wt Readings from Last 3 Encounters:   10/04/23 185 lb (83.9 kg)   23 182 lb (82.6 kg)   23 182 lb 6.4 oz (82.7 kg)       Physical Exam  Constitutional:       Appearance: She is well-developed. HENT:      Right Ear: External ear normal.      Left Ear: External ear normal.      Nose: Nose normal.   Cardiovascular:      Rate and Rhythm: Normal rate and regular rhythm. Heart sounds: Normal heart sounds, S1 normal and S2 normal.   Pulmonary:      Effort: Pulmonary effort is normal. No respiratory distress. Breath sounds: Normal breath sounds. Musculoskeletal:         General: No deformity. Normal range of motion. Cervical back: Full passive range of motion without pain and normal range of motion. Skin:     General: Skin is warm and dry. Neurological:      Mental Status: She is alert and oriented to person, place, and time. An electronic signature was used to authenticate this note.     --Rafael Monk, ORVILLE - CNP

## 2023-10-04 NOTE — TELEPHONE ENCOUNTER
Nahun Palafox is calling to request a refill on the following medication(s):    Medication Request:  Requested Prescriptions     Pending Prescriptions Disp Refills    verapamil (VERELAN) 360 MG extended release capsule 90 capsule 1     Sig: TAKE 1 CAPSULE BY MOUTH EVERY NIGHT       Last Visit Date (If Applicable):  4/89/9616    Next Visit Date:    10/4/2023

## 2023-10-24 DIAGNOSIS — I10 ESSENTIAL HYPERTENSION: ICD-10-CM

## 2023-10-26 ENCOUNTER — TELEPHONE (OUTPATIENT)
Dept: PRIMARY CARE CLINIC | Age: 53
End: 2023-10-26

## 2023-10-26 DIAGNOSIS — I10 ESSENTIAL HYPERTENSION: ICD-10-CM

## 2023-10-26 RX ORDER — HYDROCHLOROTHIAZIDE 12.5 MG/1
12.5 TABLET ORAL DAILY
Qty: 90 TABLET | Refills: 1 | Status: SHIPPED | OUTPATIENT
Start: 2023-10-26

## 2023-10-26 RX ORDER — HYDROCHLOROTHIAZIDE 12.5 MG/1
12.5 TABLET ORAL DAILY
Qty: 90 TABLET | Refills: 1 | OUTPATIENT
Start: 2023-10-26

## 2023-10-26 NOTE — TELEPHONE ENCOUNTER
Pt has requested a refill a few days ago and is all out of he hydroclothiazide and states she is all out and starting to get headaches because she's been without it

## 2023-11-02 ENCOUNTER — HOSPITAL ENCOUNTER (OUTPATIENT)
Age: 53
Discharge: HOME | End: 2023-11-02
Payer: COMMERCIAL

## 2023-11-02 DIAGNOSIS — R73.03 PRE-DIABETES: ICD-10-CM

## 2023-11-02 LAB
ALBUMIN SERPL-MCNC: 4.6 G/DL (ref 3.5–5.2)
ALBUMIN/GLOB SERPL: 1 {RATIO} (ref 1–2.5)
ALP SERPL-CCNC: 97 U/L (ref 35–104)
ALT SERPL-CCNC: 23 U/L (ref 10–35)
ANION GAP SERPL CALCULATED.3IONS-SCNC: 13 MMOL/L (ref 9–16)
AST SERPL-CCNC: 30 U/L (ref 10–35)
BILIRUB SERPL-MCNC: 0.4 MG/DL (ref 0–1.2)
BUN SERPL-MCNC: 28 MG/DL (ref 6–20)
CALCIUM SERPL-MCNC: 9.4 MG/DL (ref 8.6–10.4)
CHLORIDE SERPL-SCNC: 101 MMOL/L (ref 98–107)
CO2 SERPL-SCNC: 26 MMOL/L (ref 20–31)
CREAT SERPL-MCNC: 1 MG/DL (ref 0.5–0.9)
ERYTHROCYTE [DISTWIDTH] IN BLOOD BY AUTOMATED COUNT: 13.7 % (ref 11.8–14.4)
GFR SERPL CREATININE-BSD FRML MDRD: >60 ML/MIN/1.73M2
GLUCOSE SERPL-MCNC: 108 MG/DL (ref 74–99)
HCT VFR BLD AUTO: 36.2 % (ref 36.3–47.1)
HGB BLD-MCNC: 11.9 G/DL (ref 11.9–15.1)
MCH RBC QN AUTO: 28.4 PG (ref 25.2–33.5)
MCHC RBC AUTO-ENTMCNC: 32.9 G/DL (ref 28.4–34.8)
MCV RBC AUTO: 86.4 FL (ref 82.6–102.9)
NRBC BLD-RTO: 0 PER 100 WBC
PLATELET # BLD AUTO: 379 K/UL (ref 138–453)
PMV BLD AUTO: 11.1 FL (ref 8.1–13.5)
POTASSIUM SERPL-SCNC: 3.8 MMOL/L (ref 3.7–5.3)
PROT SERPL-MCNC: 8 G/DL (ref 6.6–8.7)
RBC # BLD AUTO: 4.19 M/UL (ref 3.95–5.11)
SODIUM SERPL-SCNC: 140 MMOL/L (ref 136–145)
WBC OTHER # BLD: 6.5 K/UL (ref 3.5–11.3)

## 2023-11-02 PROCEDURE — 80053 COMPREHEN METABOLIC PANEL: CPT

## 2023-11-02 PROCEDURE — 85027 COMPLETE CBC AUTOMATED: CPT

## 2023-11-02 PROCEDURE — 36415 COLL VENOUS BLD VENIPUNCTURE: CPT

## 2023-11-02 PROCEDURE — 83036 HEMOGLOBIN GLYCOSYLATED A1C: CPT

## 2023-11-08 LAB
ESTIMATED AVERAGE GLUCOSE: 117 MG/DL
HBA1C MFR BLD: 5.7 %

## 2023-11-22 DIAGNOSIS — M32.19 OTHER SYSTEMIC LUPUS ERYTHEMATOSUS WITH OTHER ORGAN INVOLVEMENT (HCC): ICD-10-CM

## 2023-11-22 DIAGNOSIS — M47.817 DJD (DEGENERATIVE JOINT DISEASE), LUMBOSACRAL: ICD-10-CM

## 2023-11-22 DIAGNOSIS — M62.830 BACK SPASM: ICD-10-CM

## 2023-11-22 RX ORDER — CYCLOBENZAPRINE HCL 10 MG
TABLET ORAL
Qty: 30 TABLET | Refills: 2 | Status: SHIPPED | OUTPATIENT
Start: 2023-11-22

## 2023-11-22 RX ORDER — HYDROCODONE BITARTRATE AND ACETAMINOPHEN 5; 325 MG/1; MG/1
1 TABLET ORAL EVERY 8 HOURS PRN
Qty: 90 TABLET | Refills: 0 | Status: SHIPPED | OUTPATIENT
Start: 2023-11-22 | End: 2023-12-22

## 2023-12-27 DIAGNOSIS — E03.9 HYPOTHYROIDISM, UNSPECIFIED TYPE: ICD-10-CM

## 2023-12-27 RX ORDER — LEVOTHYROXINE SODIUM 112 UG/1
112 TABLET ORAL DAILY
Qty: 90 TABLET | Refills: 0 | Status: SHIPPED | OUTPATIENT
Start: 2023-12-27

## 2024-01-10 ENCOUNTER — OFFICE VISIT (OUTPATIENT)
Dept: PRIMARY CARE CLINIC | Age: 54
End: 2024-01-10
Payer: COMMERCIAL

## 2024-01-10 VITALS
OXYGEN SATURATION: 97 % | HEART RATE: 71 BPM | SYSTOLIC BLOOD PRESSURE: 160 MMHG | DIASTOLIC BLOOD PRESSURE: 99 MMHG | WEIGHT: 195 LBS | BODY MASS INDEX: 36.84 KG/M2

## 2024-01-10 DIAGNOSIS — F11.20 OPIOID DEPENDENCE WITH CURRENT USE (HCC): ICD-10-CM

## 2024-01-10 DIAGNOSIS — M32.19 OTHER SYSTEMIC LUPUS ERYTHEMATOSUS WITH OTHER ORGAN INVOLVEMENT (HCC): ICD-10-CM

## 2024-01-10 DIAGNOSIS — E66.01 SEVERE OBESITY (BMI 35.0-39.9) WITH COMORBIDITY (HCC): ICD-10-CM

## 2024-01-10 DIAGNOSIS — Z13.29 SCREENING FOR THYROID DISORDER: ICD-10-CM

## 2024-01-10 DIAGNOSIS — M47.817 DJD (DEGENERATIVE JOINT DISEASE), LUMBOSACRAL: ICD-10-CM

## 2024-01-10 DIAGNOSIS — R73.03 PRE-DIABETES: ICD-10-CM

## 2024-01-10 DIAGNOSIS — Z79.899 MEDICATION MANAGEMENT: Primary | ICD-10-CM

## 2024-01-10 PROCEDURE — 3080F DIAST BP >= 90 MM HG: CPT | Performed by: NURSE PRACTITIONER

## 2024-01-10 PROCEDURE — 99214 OFFICE O/P EST MOD 30 MIN: CPT | Performed by: NURSE PRACTITIONER

## 2024-01-10 PROCEDURE — 3075F SYST BP GE 130 - 139MM HG: CPT | Performed by: NURSE PRACTITIONER

## 2024-01-10 RX ORDER — PREDNISOLONE 5 MG/1
TABLET ORAL
COMMUNITY

## 2024-01-10 RX ORDER — HYDROCODONE BITARTRATE AND ACETAMINOPHEN 5; 325 MG/1; MG/1
1 TABLET ORAL EVERY 8 HOURS PRN
Qty: 90 TABLET | Refills: 0 | Status: SHIPPED | OUTPATIENT
Start: 2024-01-24 | End: 2024-02-23

## 2024-01-10 ASSESSMENT — ENCOUNTER SYMPTOMS
SHORTNESS OF BREATH: 0
COUGH: 0

## 2024-01-10 ASSESSMENT — PATIENT HEALTH QUESTIONNAIRE - PHQ9
SUM OF ALL RESPONSES TO PHQ QUESTIONS 1-9: 0
SUM OF ALL RESPONSES TO PHQ QUESTIONS 1-9: 0
1. LITTLE INTEREST OR PLEASURE IN DOING THINGS: 0
SUM OF ALL RESPONSES TO PHQ9 QUESTIONS 1 & 2: 0
SUM OF ALL RESPONSES TO PHQ QUESTIONS 1-9: 0
2. FEELING DOWN, DEPRESSED OR HOPELESS: 0
SUM OF ALL RESPONSES TO PHQ QUESTIONS 1-9: 0

## 2024-01-10 NOTE — PROGRESS NOTES
Sarah Heard (:  1970) is a 53 y.o. female,Established patient, here for evaluation of the following chief complaint(s):  Follow-up (Patient is here today for a 3 month follow up )      ASSESSMENT/PLAN:  1. Medication management  -     Drug Screen, Pain; Future  2. DJD (degenerative joint disease), lumbosacral  -     HYDROcodone-acetaminophen (NORCO) 5-325 MG per tablet; Take 1 tablet by mouth every 8 hours as needed for Pain for up to 30 days. Max Daily Amount: 3 tablets, Disp-90 tablet, R-0Normal  3. Other systemic lupus erythematosus with other organ involvement (HCC)  -     HYDROcodone-acetaminophen (NORCO) 5-325 MG per tablet; Take 1 tablet by mouth every 8 hours as needed for Pain for up to 30 days. Max Daily Amount: 3 tablets, Disp-90 tablet, R-0Normal  4. Severe obesity (BMI 35.0-39.9) with comorbidity ([facility])  Working on more exercise  5. Opioid dependence with current use ([facility])  Uds ordered  6. Pre-diabetes  -     Microalbumin, Ur; Future  -     Lipid Panel; Future  7. Screening for thyroid disorder  -     TSH; Future      Return in about 3 months (around 4/10/2024).         Subjective   SUBJECTIVE/OBJECTIVE:  HPI  Is on her second taper of prednisone d/t exacerbation of lupus.  Her joints were swelling.  #90 norco are lasting 3 months. She tries to take half first, if this doesn't help she takes the other half. Rheumatology has had her on this for years, her previous rheumatologist retired and no one would fill for her any longer.     Dm- A1c was 5.7 in November.      Htn- just a little high on first check.     Review of Systems   Constitutional:  Negative for chills and fever.   Respiratory:  Negative for cough and shortness of breath.    Cardiovascular:  Negative for chest pain, palpitations and leg swelling.   Musculoskeletal:  Positive for arthralgias.          Objective   Vitals:    01/10/24 1509   BP: (!) 160/99   Pulse:    SpO2:      Physical Exam  Constitutional:       Appearance: She is

## 2024-01-25 ENCOUNTER — HOSPITAL ENCOUNTER (OUTPATIENT)
Age: 54
Discharge: HOME | End: 2024-01-25
Payer: COMMERCIAL

## 2024-01-25 DIAGNOSIS — Z79.899 MEDICATION MANAGEMENT: ICD-10-CM

## 2024-01-25 DIAGNOSIS — R73.03 PRE-DIABETES: ICD-10-CM

## 2024-01-25 DIAGNOSIS — Z13.29 SCREENING FOR THYROID DISORDER: ICD-10-CM

## 2024-01-25 LAB
CHOLEST SERPL-MCNC: 269 MG/DL (ref 0–199)
CHOLESTEROL/HDL RATIO: 3
CREAT UR-MCNC: 111 MG/DL (ref 28–217)
HDLC SERPL-MCNC: 107 MG/DL
LDLC SERPL CALC-MCNC: 101 MG/DL (ref 0–100)
MICROALBUMIN UR-MCNC: 69 MG/L (ref 0–20)
MICROALBUMIN/CREAT UR-RTO: 62 MCG/MG CREAT (ref 0–25)
TRIGL SERPL-MCNC: 307 MG/DL
TSH SERPL DL<=0.05 MIU/L-ACNC: 0.18 UIU/ML (ref 0.27–4.2)
VLDLC SERPL CALC-MCNC: 61 MG/DL

## 2024-01-25 PROCEDURE — 36415 COLL VENOUS BLD VENIPUNCTURE: CPT

## 2024-01-25 PROCEDURE — 80307 DRUG TEST PRSMV CHEM ANLYZR: CPT

## 2024-01-25 PROCEDURE — G0481 DRUG TEST DEF 8-14 CLASSES: HCPCS

## 2024-01-25 PROCEDURE — 84443 ASSAY THYROID STIM HORMONE: CPT

## 2024-01-25 PROCEDURE — 80061 LIPID PANEL: CPT

## 2024-01-25 PROCEDURE — 82570 ASSAY OF URINE CREATININE: CPT

## 2024-01-25 PROCEDURE — 82043 UR ALBUMIN QUANTITATIVE: CPT

## 2024-01-28 LAB
6-ACETYLMORPHINE, UR: NOT DETECTED
7-AMINOCLONAZEPAM, URINE: NOT DETECTED
ALPHA-OH-ALPRAZ, URINE: NOT DETECTED
ALPHA-OH-MIDAZOLAM, URINE: NOT DETECTED
ALPRAZOLAM, URINE: NOT DETECTED
AMPHETAMINES, URINE: NOT DETECTED
BARBITURATES, URINE: NEGATIVE
BENZOYLECGONINE, UR: NEGATIVE
BUPRENORPHINE URINE: NOT DETECTED
CARISOPRODOL, UR: NEGATIVE
CLONAZEPAM, URINE: NOT DETECTED
CODEINE, URINE: NOT DETECTED
CREAT UR-MCNC: 109.6 MG/DL (ref 20–400)
DIAZEPAM, URINE: NOT DETECTED
DRUGS EXPECTED, UR: NORMAL
EER HI RES INTERP UR: NORMAL
ETHYL GLUCURONIDE UR: NEGATIVE
FENTANYL URINE: NOT DETECTED
GABAPENTIN: NOT DETECTED
HYDROCODONE, URINE: PRESENT
HYDROMORPHONE, URINE: PRESENT
LORAZEPAM, URINE: NOT DETECTED
MARIJUANA METAB, UR: NEGATIVE
MDA, UR: NOT DETECTED
MDEA, EVE, UR: NOT DETECTED
MDMA URINE: NOT DETECTED
MEPERIDINE METAB, UR: NOT DETECTED
METHADONE, URINE: NEGATIVE
METHAMPHETAMINE, URINE: NOT DETECTED
METHYLPHENIDATE: NOT DETECTED
MIDAZOLAM, URINE: NOT DETECTED
MORPHINE, OPI1M: NOT DETECTED
NALOXONE URINE: NOT DETECTED
NORBUPRENORPHINE, URINE: NOT DETECTED
NORDIAZEPAM, URINE: NOT DETECTED
NORFENTANYL, URINE: NOT DETECTED
NORHYDROCODONE, URINE: NOT DETECTED
NOROXYCODONE, URINE: NOT DETECTED
NOROXYMORPHONE, URINE: NOT DETECTED
OXAZEPAM, URINE: NOT DETECTED
OXYCODONE URINE: NOT DETECTED
OXYMORPHONE, URINE: NOT DETECTED
PAIN MANAGEMENT DRUG PANEL INTERP, URINE: NORMAL
PAIN MGT DRUG PANEL, HI RES, UR: NORMAL
PCP,URINE: NEGATIVE
PHENTERMINE, UR: NOT DETECTED
PREGABALIN: NOT DETECTED
TAPENTADOL, URINE: NOT DETECTED
TAPENTADOL-O-SULFATE, URINE: NOT DETECTED
TEMAZEPAM, URINE: NOT DETECTED
TRAMADOL, URINE: NEGATIVE
ZOLPIDEM METABOLITE (ZCA), URINE: NOT DETECTED
ZOLPIDEM, URINE: NOT DETECTED

## 2024-02-01 DIAGNOSIS — K59.00 CONSTIPATION, UNSPECIFIED CONSTIPATION TYPE: ICD-10-CM

## 2024-02-02 RX ORDER — POLYETHYLENE GLYCOL 3350 17 G/17G
POWDER, FOR SOLUTION ORAL
Qty: 510 G | Refills: 11 | Status: SHIPPED | OUTPATIENT
Start: 2024-02-02

## 2024-02-12 DIAGNOSIS — M62.830 BACK SPASM: ICD-10-CM

## 2024-02-12 RX ORDER — CYCLOBENZAPRINE HCL 10 MG
TABLET ORAL
Qty: 30 TABLET | Refills: 2 | Status: SHIPPED | OUTPATIENT
Start: 2024-02-12

## 2024-03-05 DIAGNOSIS — E03.9 HYPOTHYROIDISM, UNSPECIFIED TYPE: ICD-10-CM

## 2024-03-05 RX ORDER — LEVOTHYROXINE SODIUM 0.1 MG/1
112 TABLET ORAL DAILY
Qty: 90 TABLET | Refills: 1 | Status: SHIPPED | OUTPATIENT
Start: 2024-03-05

## 2024-03-18 DIAGNOSIS — M32.19 OTHER SYSTEMIC LUPUS ERYTHEMATOSUS WITH OTHER ORGAN INVOLVEMENT (HCC): Primary | ICD-10-CM

## 2024-03-22 RX ORDER — HYDROCODONE BITARTRATE AND ACETAMINOPHEN 5; 325 MG/1; MG/1
1 TABLET ORAL EVERY 8 HOURS PRN
Qty: 90 TABLET | Refills: 0 | Status: SHIPPED | OUTPATIENT
Start: 2024-03-22 | End: 2024-04-21

## 2024-03-28 DIAGNOSIS — E03.9 HYPOTHYROIDISM, UNSPECIFIED TYPE: ICD-10-CM

## 2024-03-28 RX ORDER — LEVOTHYROXINE SODIUM 112 UG/1
112 TABLET ORAL DAILY
Qty: 90 TABLET | Refills: 0 | OUTPATIENT
Start: 2024-03-28

## 2024-03-28 NOTE — TELEPHONE ENCOUNTER
Pt states she did not request this medication the pharmacy keeps requesting automatically. States she will get her labs done

## 2024-03-29 DIAGNOSIS — I10 ESSENTIAL HYPERTENSION: ICD-10-CM

## 2024-04-01 RX ORDER — METOPROLOL TARTRATE 75 MG/1
TABLET, FILM COATED ORAL
Qty: 180 TABLET | Refills: 1 | Status: SHIPPED | OUTPATIENT
Start: 2024-04-01

## 2024-04-10 ENCOUNTER — OFFICE VISIT (OUTPATIENT)
Dept: PRIMARY CARE CLINIC | Age: 54
End: 2024-04-10
Payer: COMMERCIAL

## 2024-04-10 VITALS
WEIGHT: 189.8 LBS | SYSTOLIC BLOOD PRESSURE: 128 MMHG | DIASTOLIC BLOOD PRESSURE: 85 MMHG | BODY MASS INDEX: 35.86 KG/M2 | HEART RATE: 69 BPM | OXYGEN SATURATION: 98 %

## 2024-04-10 DIAGNOSIS — Z12.31 ENCOUNTER FOR SCREENING MAMMOGRAM FOR BREAST CANCER: Primary | ICD-10-CM

## 2024-04-10 DIAGNOSIS — E78.2 MIXED HYPERLIPIDEMIA: ICD-10-CM

## 2024-04-10 DIAGNOSIS — M32.19 OTHER SYSTEMIC LUPUS ERYTHEMATOSUS WITH OTHER ORGAN INVOLVEMENT (HCC): ICD-10-CM

## 2024-04-10 DIAGNOSIS — I10 ESSENTIAL HYPERTENSION: ICD-10-CM

## 2024-04-10 DIAGNOSIS — R73.03 PRE-DIABETES: ICD-10-CM

## 2024-04-10 PROCEDURE — 99214 OFFICE O/P EST MOD 30 MIN: CPT | Performed by: NURSE PRACTITIONER

## 2024-04-10 PROCEDURE — 3079F DIAST BP 80-89 MM HG: CPT | Performed by: NURSE PRACTITIONER

## 2024-04-10 PROCEDURE — 3074F SYST BP LT 130 MM HG: CPT | Performed by: NURSE PRACTITIONER

## 2024-04-10 RX ORDER — HYDROCODONE BITARTRATE AND ACETAMINOPHEN 5; 325 MG/1; MG/1
1 TABLET ORAL EVERY 8 HOURS PRN
Qty: 90 TABLET | Refills: 0 | Status: SHIPPED | OUTPATIENT
Start: 2024-05-20 | End: 2024-06-19

## 2024-04-10 RX ORDER — SIMVASTATIN 20 MG
20 TABLET ORAL EVERY EVENING
Qty: 90 TABLET | Refills: 1 | Status: SHIPPED | OUTPATIENT
Start: 2024-04-10

## 2024-04-10 RX ORDER — VERAPAMIL HYDROCHLORIDE 360 MG/1
CAPSULE, DELAYED RELEASE PELLETS ORAL
Qty: 90 CAPSULE | Refills: 1 | Status: SHIPPED | OUTPATIENT
Start: 2024-04-10

## 2024-04-10 RX ORDER — HYDROCHLOROTHIAZIDE 12.5 MG/1
12.5 TABLET ORAL DAILY
Qty: 90 TABLET | Refills: 1 | Status: SHIPPED | OUTPATIENT
Start: 2024-04-10

## 2024-04-10 RX ORDER — HYDROCODONE BITARTRATE AND ACETAMINOPHEN 5; 325 MG/1; MG/1
1 TABLET ORAL EVERY 6 HOURS PRN
Qty: 10 TABLET | Refills: 0 | Status: SHIPPED | OUTPATIENT
Start: 2024-07-19 | End: 2024-07-22

## 2024-04-10 SDOH — ECONOMIC STABILITY: INCOME INSECURITY: HOW HARD IS IT FOR YOU TO PAY FOR THE VERY BASICS LIKE FOOD, HOUSING, MEDICAL CARE, AND HEATING?: NOT VERY HARD

## 2024-04-10 SDOH — ECONOMIC STABILITY: FOOD INSECURITY: WITHIN THE PAST 12 MONTHS, THE FOOD YOU BOUGHT JUST DIDN'T LAST AND YOU DIDN'T HAVE MONEY TO GET MORE.: NEVER TRUE

## 2024-04-10 SDOH — ECONOMIC STABILITY: FOOD INSECURITY: WITHIN THE PAST 12 MONTHS, YOU WORRIED THAT YOUR FOOD WOULD RUN OUT BEFORE YOU GOT MONEY TO BUY MORE.: NEVER TRUE

## 2024-04-10 ASSESSMENT — ENCOUNTER SYMPTOMS
SHORTNESS OF BREATH: 0
COUGH: 0

## 2024-04-10 NOTE — PROGRESS NOTES
Sarah Heard (:  1970) is a 53 y.o. female,Established patient, here for evaluation of the following chief complaint(s):  Follow-up (Patient is here for 3 month follow up.)         ASSESSMENT/PLAN:  1. Encounter for screening mammogram for breast cancer  -     GWENDOLYN DIGITAL SCREEN W OR WO CAD BILATERAL; Future  2. Other systemic lupus erythematosus with other organ involvement (HCC)  Still sees rheum, tries to only take 1/2 tablet with flares.  -     HYDROcodone-acetaminophen (NORCO) 5-325 MG per tablet; Take 1 tablet by mouth every 8 hours as needed for Pain for up to 30 days. Intended supply: 5 days. Take lowest dose possible to manage pain Max Daily Amount: 3 tablets, Disp-90 tablet, R-0Normal  -     HYDROcodone-acetaminophen (NORCO) 5-325 MG per tablet; Take 1 tablet by mouth every 6 hours as needed for Pain for up to 3 days. Intended supply: 3 days. Take lowest dose possible to manage pain Max Daily Amount: 4 tablets, Disp-10 tablet, R-0Normal  3. Essential hypertension  Stable on meds.     -     hydroCHLOROthiazide 12.5 MG tablet; Take 1 tablet by mouth daily, Disp-90 tablet, R-1Normal  -     verapamil (VERELAN) 360 MG extended release capsule; TAKE 1 CAPSULE BY MOUTH EVERY NIGHT, Disp-90 capsule, R-1Normal  4. Mixed hyperlipidemia  -     simvastatin (ZOCOR) 20 MG tablet; Take 1 tablet by mouth every evening, Disp-90 tablet, R-1Normal  5. Pre-diabetes  -     Hemoglobin A1C; Future      Return in about 3 months (around 7/10/2024).         Subjective   SUBJECTIVE/OBJECTIVE:  HPI    Review of Systems   Constitutional:  Negative for chills and fever.   Respiratory:  Negative for cough and shortness of breath.    Cardiovascular:  Negative for chest pain, palpitations and leg swelling.       Objective   Vitals:    04/10/24 1454   BP: 128/85   Pulse: 69   SpO2: 98%     Physical Exam  Constitutional:       Appearance: She is well-developed.   HENT:      Right Ear: External ear normal.      Left Ear: External

## 2024-04-17 ENCOUNTER — HOSPITAL ENCOUNTER (OUTPATIENT)
Dept: MAMMOGRAPHY | Age: 54
Discharge: HOME | End: 2024-04-19
Payer: COMMERCIAL

## 2024-04-17 DIAGNOSIS — Z12.31 ENCOUNTER FOR SCREENING MAMMOGRAM FOR BREAST CANCER: ICD-10-CM

## 2024-04-17 PROCEDURE — 77063 BREAST TOMOSYNTHESIS BI: CPT

## 2024-04-25 DIAGNOSIS — M62.830 BACK SPASM: ICD-10-CM

## 2024-04-26 RX ORDER — CYCLOBENZAPRINE HCL 10 MG
TABLET ORAL
Qty: 30 TABLET | Refills: 5 | Status: SHIPPED | OUTPATIENT
Start: 2024-04-26

## 2024-05-09 ENCOUNTER — HOSPITAL ENCOUNTER (OUTPATIENT)
Dept: WOMENS IMAGING | Age: 54
Discharge: HOME | End: 2024-05-11
Payer: COMMERCIAL

## 2024-05-09 DIAGNOSIS — R92.8 ABNORMAL MAMMOGRAM: ICD-10-CM

## 2024-05-09 PROCEDURE — G0279 TOMOSYNTHESIS, MAMMO: HCPCS

## 2024-05-11 ENCOUNTER — HOSPITAL ENCOUNTER (OUTPATIENT)
Age: 54
Discharge: HOME | End: 2024-05-11
Payer: COMMERCIAL

## 2024-05-11 DIAGNOSIS — E03.9 HYPOTHYROIDISM, UNSPECIFIED TYPE: ICD-10-CM

## 2024-05-11 DIAGNOSIS — R73.03 PRE-DIABETES: ICD-10-CM

## 2024-05-11 LAB — TSH SERPL DL<=0.05 MIU/L-ACNC: 1.35 UIU/ML (ref 0.27–4.2)

## 2024-05-11 PROCEDURE — 36415 COLL VENOUS BLD VENIPUNCTURE: CPT

## 2024-05-11 PROCEDURE — 83036 HEMOGLOBIN GLYCOSYLATED A1C: CPT

## 2024-05-11 PROCEDURE — 84443 ASSAY THYROID STIM HORMONE: CPT

## 2024-05-12 LAB
EST. AVERAGE GLUCOSE BLD GHB EST-MCNC: 128 MG/DL
HBA1C MFR BLD: 6.1 % (ref 4–6)

## 2024-06-02 DIAGNOSIS — E03.9 HYPOTHYROIDISM, UNSPECIFIED TYPE: ICD-10-CM

## 2024-06-03 RX ORDER — LEVOTHYROXINE SODIUM 0.1 MG/1
100 TABLET ORAL DAILY
Qty: 90 TABLET | Refills: 0 | Status: SHIPPED | OUTPATIENT
Start: 2024-06-03

## 2024-06-06 ENCOUNTER — TELEPHONE (OUTPATIENT)
Dept: PRIMARY CARE CLINIC | Age: 54
End: 2024-06-06

## 2024-06-06 DIAGNOSIS — M32.19 OTHER SYSTEMIC LUPUS ERYTHEMATOSUS WITH OTHER ORGAN INVOLVEMENT (HCC): ICD-10-CM

## 2024-06-06 RX ORDER — HYDROCODONE BITARTRATE AND ACETAMINOPHEN 5; 325 MG/1; MG/1
1 TABLET ORAL EVERY 8 HOURS PRN
Qty: 90 TABLET | Refills: 0 | Status: SHIPPED | OUTPATIENT
Start: 2024-06-06 | End: 2024-07-06

## 2024-06-06 NOTE — TELEPHONE ENCOUNTER
Patient called stated that her script for norco for 7/129/2024 is only a quantity of 10 and should be for 90. She just doesn't want there to be a problem when picking it up. Please advise.

## 2024-06-08 ENCOUNTER — HOSPITAL ENCOUNTER (EMERGENCY)
Age: 54
Discharge: HOME | End: 2024-06-08
Attending: EMERGENCY MEDICINE
Payer: COMMERCIAL

## 2024-06-08 VITALS
TEMPERATURE: 98.1 F | WEIGHT: 192 LBS | SYSTOLIC BLOOD PRESSURE: 140 MMHG | BODY MASS INDEX: 35.33 KG/M2 | OXYGEN SATURATION: 99 % | RESPIRATION RATE: 18 BRPM | HEIGHT: 62 IN | HEART RATE: 79 BPM | DIASTOLIC BLOOD PRESSURE: 83 MMHG

## 2024-06-08 DIAGNOSIS — M79.89 LEG SWELLING: Primary | ICD-10-CM

## 2024-06-08 DIAGNOSIS — M32.9 LUPUS (HCC): ICD-10-CM

## 2024-06-08 DIAGNOSIS — E87.6 HYPOKALEMIA: ICD-10-CM

## 2024-06-08 LAB
ANION GAP SERPL CALCULATED.3IONS-SCNC: 10 MMOL/L (ref 9–17)
BASOPHILS # BLD: 0.06 K/UL (ref 0–0.2)
BASOPHILS NFR BLD: 1 % (ref 0–2)
BUN SERPL-MCNC: 20 MG/DL (ref 6–20)
BUN/CREAT SERPL: 25 (ref 9–20)
CALCIUM SERPL-MCNC: 9.2 MG/DL (ref 8.6–10.4)
CHLORIDE SERPL-SCNC: 102 MMOL/L (ref 98–107)
CO2 SERPL-SCNC: 30 MMOL/L (ref 20–31)
CREAT SERPL-MCNC: 0.8 MG/DL (ref 0.5–0.9)
EOSINOPHIL # BLD: 0.09 K/UL (ref 0–0.44)
EOSINOPHILS RELATIVE PERCENT: 1 % (ref 1–4)
ERYTHROCYTE [DISTWIDTH] IN BLOOD BY AUTOMATED COUNT: 14.7 % (ref 11.8–14.4)
GFR, ESTIMATED: 87 ML/MIN/1.73M2
GLUCOSE SERPL-MCNC: 114 MG/DL (ref 70–99)
HCT VFR BLD AUTO: 36.5 % (ref 36.3–47.1)
HGB BLD-MCNC: 12 G/DL (ref 11.9–15.1)
IMM GRANULOCYTES # BLD AUTO: 0.09 K/UL (ref 0–0.3)
IMM GRANULOCYTES NFR BLD: 1 %
LYMPHOCYTES NFR BLD: 2.89 K/UL (ref 1.1–3.7)
LYMPHOCYTES RELATIVE PERCENT: 26 % (ref 24–43)
MCH RBC QN AUTO: 28.2 PG (ref 25.2–33.5)
MCHC RBC AUTO-ENTMCNC: 32.9 G/DL (ref 28.4–34.8)
MCV RBC AUTO: 85.7 FL (ref 82.6–102.9)
MONOCYTES NFR BLD: 1.05 K/UL (ref 0.1–1.2)
MONOCYTES NFR BLD: 10 % (ref 3–12)
NEUTROPHILS NFR BLD: 61 % (ref 36–65)
NEUTS SEG NFR BLD: 6.84 K/UL (ref 1.5–8.1)
NRBC BLD-RTO: 0 PER 100 WBC
PLATELET # BLD AUTO: 401 K/UL (ref 138–453)
PMV BLD AUTO: 9.7 FL (ref 8.1–13.5)
POTASSIUM SERPL-SCNC: 2.9 MMOL/L (ref 3.7–5.3)
RBC # BLD AUTO: 4.26 M/UL (ref 3.95–5.11)
RBC # BLD: ABNORMAL 10*6/UL
SODIUM SERPL-SCNC: 142 MMOL/L (ref 135–144)
WBC OTHER # BLD: 11 K/UL (ref 3.5–11.3)

## 2024-06-08 PROCEDURE — 85025 COMPLETE CBC W/AUTO DIFF WBC: CPT

## 2024-06-08 PROCEDURE — 99284 EMERGENCY DEPT VISIT MOD MDM: CPT

## 2024-06-08 PROCEDURE — 96365 THER/PROPH/DIAG IV INF INIT: CPT

## 2024-06-08 PROCEDURE — 6370000000 HC RX 637 (ALT 250 FOR IP): Performed by: NURSE PRACTITIONER

## 2024-06-08 PROCEDURE — 6360000002 HC RX W HCPCS: Performed by: NURSE PRACTITIONER

## 2024-06-08 PROCEDURE — 80048 BASIC METABOLIC PNL TOTAL CA: CPT

## 2024-06-08 PROCEDURE — 2580000003 HC RX 258: Performed by: NURSE PRACTITIONER

## 2024-06-08 RX ORDER — POTASSIUM CHLORIDE 20 MEQ/1
40 TABLET, EXTENDED RELEASE ORAL ONCE
Status: COMPLETED | OUTPATIENT
Start: 2024-06-08 | End: 2024-06-08

## 2024-06-08 RX ORDER — SODIUM CHLORIDE 9 MG/ML
INJECTION, SOLUTION INTRAVENOUS CONTINUOUS
Status: DISCONTINUED | OUTPATIENT
Start: 2024-06-08 | End: 2024-06-08 | Stop reason: HOSPADM

## 2024-06-08 RX ORDER — POTASSIUM CHLORIDE 20 MEQ/1
20 TABLET, EXTENDED RELEASE ORAL 2 TIMES DAILY
Qty: 10 TABLET | Refills: 0 | Status: SHIPPED | OUTPATIENT
Start: 2024-06-08

## 2024-06-08 RX ORDER — PREDNISONE 10 MG/1
TABLET ORAL
Qty: 30 TABLET | Refills: 0 | Status: SHIPPED | OUTPATIENT
Start: 2024-06-08

## 2024-06-08 RX ADMIN — POTASSIUM CHLORIDE 40 MEQ: 1500 TABLET, EXTENDED RELEASE ORAL at 12:52

## 2024-06-08 RX ADMIN — SODIUM CHLORIDE: 9 INJECTION, SOLUTION INTRAVENOUS at 11:23

## 2024-06-08 RX ADMIN — SODIUM CHLORIDE 500 MG: 9 INJECTION, SOLUTION INTRAVENOUS at 11:32

## 2024-06-08 ASSESSMENT — PAIN - FUNCTIONAL ASSESSMENT: PAIN_FUNCTIONAL_ASSESSMENT: NONE - DENIES PAIN

## 2024-06-08 ASSESSMENT — ENCOUNTER SYMPTOMS
SHORTNESS OF BREATH: 0
COLOR CHANGE: 0
BACK PAIN: 0
ABDOMINAL PAIN: 0

## 2024-06-08 NOTE — ED NOTES
Pt to ed c/o leg swelling since Wednesday. Pt states hx lupus. Pt states flare up started on Wednesday. Pt states pain in legs only when walking. Pt rates pain 1/10. Pt a/o x4. Respirations equal and non labored. Family at bedside. Bed locked and in lowest position. Call light in reach.

## 2024-06-08 NOTE — ED PROVIDER NOTES
Team [facility] ED  eMERGENCY dEPARTMENT eNCOUnter      Pt Name: Sarah Heard  MRN: [id number]  Birthdate 1970  Date of evaluation: 6/8/2024  Provider: ORVILLE Haq CNP    CHIEF COMPLAINT       Chief Complaint   Patient presents with    Leg Swelling     Rt leg swelling with diff walking has h/o lupus         HISTORY OF PRESENT ILLNESS  (Location/Symptom, Timing/Onset, Context/Setting, Quality, Duration, Modifying Factors, Severity.)   Sarah Heard is a 53 y.o. female who presents to the emergency department. C/o leg swelling. Onset was a few days ago. She is now having difficulty ambulating. States she is having a lupus flare up. Denies fever, chills, injury, weakness, back pain. Denies N/T. Denies SOB, dizziness. Rates her pain 0/10. She has been taking 5 mg prednisone daily.     Nursing Notes were reviewed.    ALLERGIES     Patient has no known allergies.    CURRENT MEDICATIONS       Discharge Medication List as of 6/8/2024  1:48 PM        CONTINUE these medications which have NOT CHANGED    Details   !! HYDROcodone-acetaminophen (NORCO) 5-325 MG per tablet Take 1 tablet by mouth every 8 hours as needed for Pain for up to 30 days. Intended supply: 5 days. Take lowest dose possible to manage pain Max Daily Amount: 3 tablets, Disp-90 tablet, R-0Normal      levothyroxine (SYNTHROID) 100 MCG tablet Take 1 tablet by mouth once daily, Disp-90 tablet, R-0Normal      cyclobenzaprine (FLEXERIL) 10 MG tablet TAKE 1 TABLET BY MOUTH EVERY NIGHT AT BEDTIME AS NEEDED FOR MUSCLE SPASMS, Disp-30 tablet, R-5Normal      !! HYDROcodone-acetaminophen (NORCO) 5-325 MG per tablet Take 1 tablet by mouth every 6 hours as needed for Pain for up to 3 days. Intended supply: 3 days. Take lowest dose possible to manage pain Max Daily Amount: 4 tablets, Disp-10 tablet, R-0Normal      hydroCHLOROthiazide 12.5 MG tablet Take 1 tablet by mouth daily, Disp-90 tablet, R-1Normal      verapamil (VERELAN) 360 MG extended

## 2024-06-08 NOTE — ED PROVIDER NOTES
eMERGENCY dEPARTMENT eNCOUnter   Independent Attestation     Pt Name: Sarah Heard  MRN: [id number]  Birthdate 1970  Date of evaluation: 6/8/24     Sarah Heard is a 53 y.o. female with CC: Leg Swelling (Rt leg swelling with diff walking has h/o lupus)      Based on the medical record the care appears appropriate.  I was personally available for consultation in the Emergency Department.    Ky Ulloa MD  Attending Emergency Physician          Ky Ulloa MD  06/08/24 1111

## 2024-06-19 ENCOUNTER — OFFICE VISIT (OUTPATIENT)
Dept: PRIMARY CARE CLINIC | Age: 54
End: 2024-06-19
Payer: COMMERCIAL

## 2024-06-19 ENCOUNTER — HOSPITAL ENCOUNTER (OUTPATIENT)
Age: 54
Discharge: HOME | End: 2024-06-21
Payer: COMMERCIAL

## 2024-06-19 ENCOUNTER — HOSPITAL ENCOUNTER (OUTPATIENT)
Dept: GENERAL RADIOLOGY | Age: 54
Discharge: HOME | End: 2024-06-21
Payer: COMMERCIAL

## 2024-06-19 VITALS — OXYGEN SATURATION: 97 % | DIASTOLIC BLOOD PRESSURE: 88 MMHG | SYSTOLIC BLOOD PRESSURE: 140 MMHG | HEART RATE: 68 BPM

## 2024-06-19 DIAGNOSIS — M25.561 ACUTE PAIN OF RIGHT KNEE: Primary | ICD-10-CM

## 2024-06-19 DIAGNOSIS — M25.561 ACUTE PAIN OF RIGHT KNEE: ICD-10-CM

## 2024-06-19 PROCEDURE — 99213 OFFICE O/P EST LOW 20 MIN: CPT | Performed by: NURSE PRACTITIONER

## 2024-06-19 PROCEDURE — 73562 X-RAY EXAM OF KNEE 3: CPT

## 2024-06-19 PROCEDURE — 3077F SYST BP >= 140 MM HG: CPT | Performed by: NURSE PRACTITIONER

## 2024-06-19 PROCEDURE — 3079F DIAST BP 80-89 MM HG: CPT | Performed by: NURSE PRACTITIONER

## 2024-06-19 NOTE — PROGRESS NOTES
Sarah Heard (:  1970) is a 53 y.o. female,Established patient, here for evaluation of the following chief complaint(s):  Lupus (flare) and Swelling (Knee right wants XR)      Assessment & Plan   ASSESSMENT/PLAN:  1. Acute pain of right knee  -     XR KNEE RIGHT (3 VIEWS); Future      No follow-ups on file.         Subjective   SUBJECTIVE/OBJECTIVE:  HPI  She had a bad lupus flare last week.  She couldn't even walk.  She had to go to the er, they increased her steroids.  She has an apt with rheum next week- earliest they could get her in.     Review of Systems   Constitutional:  Negative for chills and fever.   Respiratory:  Negative for cough and shortness of breath.    Cardiovascular:  Negative for chest pain, palpitations and leg swelling.          Objective   Vitals:    24 1329   BP: (!) 140/88   Pulse:    SpO2:      Physical Exam  Constitutional:       Appearance: She is well-developed.   HENT:      Right Ear: External ear normal.      Left Ear: External ear normal.      Nose: Nose normal.   Cardiovascular:      Rate and Rhythm: Normal rate and regular rhythm.      Heart sounds: Normal heart sounds, S1 normal and S2 normal.   Pulmonary:      Effort: Pulmonary effort is normal. No respiratory distress.      Breath sounds: Normal breath sounds.   Musculoskeletal:         General: No deformity. Normal range of motion.      Cervical back: Full passive range of motion without pain and normal range of motion.      Right knee: Swelling present. No bony tenderness. Tenderness present.   Skin:     General: Skin is warm and dry.   Neurological:      Mental Status: She is alert and oriented to person, place, and time.            An electronic signature was used to authenticate this note.    --ALFIE WILKINS, ORVILLE - CNP

## 2024-06-20 ENCOUNTER — HOSPITAL ENCOUNTER (EMERGENCY)
Age: 54
Discharge: HOME | End: 2024-06-20
Attending: EMERGENCY MEDICINE
Payer: COMMERCIAL

## 2024-06-20 VITALS
SYSTOLIC BLOOD PRESSURE: 140 MMHG | OXYGEN SATURATION: 97 % | WEIGHT: 190 LBS | BODY MASS INDEX: 34.75 KG/M2 | RESPIRATION RATE: 16 BRPM | TEMPERATURE: 98.4 F | HEART RATE: 100 BPM | DIASTOLIC BLOOD PRESSURE: 103 MMHG

## 2024-06-20 DIAGNOSIS — H10.33 ACUTE BACTERIAL CONJUNCTIVITIS OF BOTH EYES: Primary | ICD-10-CM

## 2024-06-20 PROCEDURE — 99283 EMERGENCY DEPT VISIT LOW MDM: CPT

## 2024-06-20 RX ORDER — BACITRACIN ZINC AND POLYMYXIN B SULFATE 500; 10000 [USP'U]/G; [USP'U]/G
OINTMENT OPHTHALMIC
Qty: 3.5 G | Refills: 0 | OUTPATIENT
Start: 2024-06-20

## 2024-06-20 RX ORDER — SULFACETAMIDE SODIUM 100 MG/ML
2 SOLUTION/ DROPS OPHTHALMIC EVERY 4 HOURS
Qty: 10 ML | Refills: 0 | Status: SHIPPED | OUTPATIENT
Start: 2024-06-20 | End: 2024-06-30

## 2024-06-20 ASSESSMENT — PAIN - FUNCTIONAL ASSESSMENT: PAIN_FUNCTIONAL_ASSESSMENT: 0-10

## 2024-06-20 ASSESSMENT — ENCOUNTER SYMPTOMS
EYE PAIN: 1
PHOTOPHOBIA: 1
EYE REDNESS: 1
EYE DISCHARGE: 1
EYE ITCHING: 1

## 2024-06-20 ASSESSMENT — VISUAL ACUITY: OU: 1

## 2024-06-20 ASSESSMENT — PAIN SCALES - GENERAL: PAINLEVEL_OUTOF10: 10

## 2024-06-20 NOTE — DISCHARGE INSTRUCTIONS
Use medications as prescribed.  Follow-up with your primary care provider in a week.  Return to emergency department for worsening or new symptoms.

## 2024-06-20 NOTE — TELEPHONE ENCOUNTER
The original prescription was discontinued on 1/10/2024 by Karen Maria MA for the following reason: LIST CLEANUP. Renewing this prescription may not be appropriate.     
low risk

## 2024-06-20 NOTE — ED PROVIDER NOTES
eMERGENCY dEPARTMENT eNCOUnter   Independent Attestation     Pt Name: Sarah Heard  MRN: [id number]  Birthdate 1970  Date of evaluation: 6/20/24     Sarah Heard is a 53 y.o. female with CC: Eye Pain (Suspects pink eye, marc eyes, drainage and redness since yesterday)        This visit was performed by both a physician and an APC. I performed all aspects of the MDM as documented.      Gill Herrera MD  Attending Emergency Physician            Gill Herrera MD  06/20/24 6937

## 2024-06-20 NOTE — ED PROVIDER NOTES
Team [facility] ED  eMERGENCY dEPARTMENT eNCOUnter      Pt Name: Sarah Heard  MRN: [id number]  Birthdate 1970  Date of evaluation: 6/20/2024  Provider: ORVILLE Larson CNP    CHIEF COMPLAINT       Chief Complaint   Patient presents with    Eye Pain     Suspects pink eye, marc eyes, drainage and redness since yesterday         HISTORY OF PRESENT ILLNESS  (Location/Symptom, Timing/Onset, Context/Setting, Quality, Duration, Modifying Factors, Severity.)   Sarah Heard is a 53 y.o. female who presents to the emergency department for evaluation of bilateral eye redness, irritation and drainage that started yesterday when she woke up.  Denies injury to the eye.  Does not wear contacts.  No fever.      Nursing Notes were reviewed.    ALLERGIES     Patient has no known allergies.    CURRENT MEDICATIONS       Previous Medications    AZATHIOPRINE (IMURAN) 50 MG TABLET    TK 1 T PO D    CYCLOBENZAPRINE (FLEXERIL) 10 MG TABLET    TAKE 1 TABLET BY MOUTH EVERY NIGHT AT BEDTIME AS NEEDED FOR MUSCLE SPASMS    DICLOFENAC SODIUM (VOLTAREN) 1 % GEL    APPLY 2 GRAMS TOPICALLY TO THE AFFECTED AREA FOUR TIMES DAILY    HYDROCHLOROTHIAZIDE 12.5 MG TABLET    Take 1 tablet by mouth daily    HYDROCODONE-ACETAMINOPHEN (NORCO) 5-325 MG PER TABLET    Take 1 tablet by mouth every 6 hours as needed for Pain for up to 3 days. Intended supply: 3 days. Take lowest dose possible to manage pain Max Daily Amount: 4 tablets    HYDROCODONE-ACETAMINOPHEN (NORCO) 5-325 MG PER TABLET    Take 1 tablet by mouth every 8 hours as needed for Pain for up to 30 days. Intended supply: 5 days. Take lowest dose possible to manage pain Max Daily Amount: 3 tablets    LEVOTHYROXINE (SYNTHROID) 100 MCG TABLET    Take 1 tablet by mouth once daily    METOPROLOL TARTRATE 75 MG TABS    TAKE 1 TABLET BY MOUTH TWICE DAILY    POLYETHYLENE GLYCOL (GLYCOLAX) 17 GM/SCOOP POWDER    TAKE 17 GRAMS BY MOUTH ONCE DAILY    POTASSIUM CHLORIDE (KLOR-CON M) 20 MEQ

## 2024-06-24 ENCOUNTER — TELEPHONE (OUTPATIENT)
Dept: PRIMARY CARE CLINIC | Age: 54
End: 2024-06-24

## 2024-06-25 DIAGNOSIS — M32.19 OTHER SYSTEMIC LUPUS ERYTHEMATOSUS WITH OTHER ORGAN INVOLVEMENT (HCC): ICD-10-CM

## 2024-07-03 DIAGNOSIS — M25.561 ACUTE PAIN OF RIGHT KNEE: Primary | ICD-10-CM

## 2024-07-09 DIAGNOSIS — M25.561 RIGHT KNEE PAIN, UNSPECIFIED CHRONICITY: Primary | ICD-10-CM

## 2024-07-10 ENCOUNTER — OFFICE VISIT (OUTPATIENT)
Dept: ORTHOPEDIC SURGERY | Age: 54
End: 2024-07-10
Payer: COMMERCIAL

## 2024-07-10 ENCOUNTER — HOSPITAL ENCOUNTER (OUTPATIENT)
Age: 54
Setting detail: SPECIMEN
Discharge: HOME | End: 2024-07-10

## 2024-07-10 VITALS — RESPIRATION RATE: 15 BRPM | BODY MASS INDEX: 35.33 KG/M2 | OXYGEN SATURATION: 97 % | WEIGHT: 192 LBS | HEIGHT: 62 IN

## 2024-07-10 DIAGNOSIS — M21.41 PES PLANUS OF BOTH FEET: ICD-10-CM

## 2024-07-10 DIAGNOSIS — M21.42 PES PLANUS OF BOTH FEET: ICD-10-CM

## 2024-07-10 DIAGNOSIS — M25.461 KNEE EFFUSION, RIGHT: ICD-10-CM

## 2024-07-10 DIAGNOSIS — M17.11 OSTEOARTHRITIS OF RIGHT PATELLOFEMORAL JOINT: Primary | ICD-10-CM

## 2024-07-10 DIAGNOSIS — M17.11 OSTEOARTHRITIS OF RIGHT PATELLOFEMORAL JOINT: ICD-10-CM

## 2024-07-10 PROCEDURE — 20611 DRAIN/INJ JOINT/BURSA W/US: CPT | Performed by: PHYSICIAN ASSISTANT

## 2024-07-10 PROCEDURE — 99204 OFFICE O/P NEW MOD 45 MIN: CPT | Performed by: PHYSICIAN ASSISTANT

## 2024-07-10 RX ORDER — LIDOCAINE HYDROCHLORIDE 10 MG/ML
6 INJECTION, SOLUTION INFILTRATION; PERINEURAL ONCE
Status: COMPLETED | OUTPATIENT
Start: 2024-07-10 | End: 2024-07-10

## 2024-07-10 RX ORDER — METHYLPREDNISOLONE ACETATE 80 MG/ML
80 INJECTION, SUSPENSION INTRA-ARTICULAR; INTRALESIONAL; INTRAMUSCULAR; SOFT TISSUE ONCE
Status: COMPLETED | OUTPATIENT
Start: 2024-07-10 | End: 2024-07-10

## 2024-07-10 RX ADMIN — LIDOCAINE HYDROCHLORIDE 6 ML: 10 INJECTION, SOLUTION INFILTRATION; PERINEURAL at 10:19

## 2024-07-10 RX ADMIN — METHYLPREDNISOLONE ACETATE 80 MG: 80 INJECTION, SUSPENSION INTRA-ARTICULAR; INTRALESIONAL; INTRAMUSCULAR; SOFT TISSUE at 10:19

## 2024-07-10 ASSESSMENT — ENCOUNTER SYMPTOMS
NAUSEA: 0
DIARRHEA: 0
ABDOMINAL DISTENTION: 0
APNEA: 0
CHEST TIGHTNESS: 0
COUGH: 0
SHORTNESS OF BREATH: 0
CONSTIPATION: 0
GASTROINTESTINAL NEGATIVE: 1
COLOR CHANGE: 0
ABDOMINAL PAIN: 0
RESPIRATORY NEGATIVE: 1
VOMITING: 0

## 2024-07-10 NOTE — PROGRESS NOTES
[facility] ORTHOPEDICS AND SPORTS MEDICINE  [address]  [place] [address]  Dept: [phone]  Dept Fax: [phone]          Right Knee - New Patient     Subjective:     Chief Complaint   Patient presents with    Knee Pain     Right knee pain- new patient     HPI:     Sarah Heard presents today for Right knee pain. The pain has been present for 2 months. 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 The patient has tried topical creams, hydrocodone, rest with mild improvement. The pain is now described as Achy, Sharp, and Dull . There is  pain on weight bearing. The knee has swelled. There is  painful popping and clicking. The knee has caught or locked up. The knee has given out. It is  stiff upon arising from sitting. It is  painful to go up and down stairs and sit for a prolonged time. The patient has not had a cortisone injection. The patient has not tried a lubrication injection. The patient has not tried physical therapy. The patient has not had surgery.  Patient has a history of lupus.  She sees Dr. Giovana Carl her rheumatologist.    ROS:   Review of Systems   Constitutional:  Positive for activity change. Negative for appetite change, fatigue and fever.   Respiratory: Negative.  Negative for apnea, cough, chest tightness and shortness of breath.    Cardiovascular: Negative.  Negative for chest pain, palpitations and leg swelling.   Gastrointestinal: Negative.  Negative for abdominal distention, abdominal pain, constipation, diarrhea, nausea and vomiting.   Genitourinary: Negative.  Negative for difficulty urinating, dysuria and hematuria.   Musculoskeletal:  Positive for arthralgias. Negative for gait problem, joint swelling and myalgias.   Skin: Negative.  Negative for color change and rash.   Neurological: Negative.  Negative for dizziness, weakness, numbness

## 2024-07-11 LAB
APPEARANCE: ABNORMAL
BODY FLD TYPE: ABNORMAL
CLOT CHECK: ABNORMAL
COLOR FLUID: YELLOW
CRYSTALS FLD MICRO: NEGATIVE
LYMPHOCYTE, SYNOVIAL FLUID: 45 %
MONO/MACROPHAGE FLUID: 47 %
NEUTROPHIL, FLUID: 8 %
PATH INTERP FLD-IMP: NORMAL
RBC, SYNOVIAL FLUID: 5000 CELLS/UL
SPECIMEN TYPE: NORMAL
TOTAL NUCLEATED CELLS SYNOVIAL: 345 CELLS/UL

## 2024-07-14 LAB
MICROORGANISM SPEC CULT: NORMAL
MICROORGANISM/AGENT SPEC: NORMAL
SPECIMEN DESCRIPTION: NORMAL

## 2024-07-15 ENCOUNTER — TELEPHONE (OUTPATIENT)
Dept: ORTHOPEDIC SURGERY | Age: 54
End: 2024-07-15

## 2024-07-15 NOTE — TELEPHONE ENCOUNTER
Called and Spoke to the patient in regards to her Aspiration results which ia negative for gout and infection.     Patient stated that the swelling in her knee has gone down tremendosly. She did have a day of it poppin and it it hurt really bad but that went away. She stated that the top of her knww feels a bit fatty and she still has a tiny bit of swelling, but the pain and is nothing like before when she came into the office for her appointment. She stated that it does even pop a little bit sometimes but nothing super severe.     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     Let her know that she can get some knee compression sleeves and in wearing the compression sleeves for her knees, in order to keep the bottom of her legs from swelling, she should also invest in some compression socks that go all the way up and over her calves.    She expressed understanding and will see us in 8 weeks.     She also expresses great nikki from Gilma's technique and allowing Gilma to aspirate her knees and give her cortisone shots.     She stated, \"Gilma has saved my life.\"

## 2024-07-15 NOTE — TELEPHONE ENCOUNTER
----- Message from Gilma Galindo PA-C sent at 7/15/2024  7:42 AM EDT -----  Negative fluid analysis. Ask how the patient is feeling?

## 2024-07-16 LAB
MICROORGANISM SPEC CULT: NORMAL
MICROORGANISM/AGENT SPEC: NORMAL
SPECIMEN DESCRIPTION: NORMAL

## 2024-07-18 ENCOUNTER — HOSPITAL ENCOUNTER (OUTPATIENT)
Dept: PHYSICAL THERAPY | Facility: CLINIC | Age: 54
Setting detail: THERAPIES SERIES
Discharge: HOME | End: 2024-07-18
Payer: COMMERCIAL

## 2024-07-18 PROCEDURE — 97161 PT EVAL LOW COMPLEX 20 MIN: CPT

## 2024-07-18 PROCEDURE — 97110 THERAPEUTIC EXERCISES: CPT

## 2024-07-18 NOTE — CONSULTS
floor  Went to ER 1x and needed very high dose steroids to be able to walk and then the popping started happening  Sleeps down stairs now due to her knee pain and going up stairs  Currently works 2 jobs that she needs to do a lot of standing and walking  Patient was diagnosed with lupus and states that she gets flare ups in her back and knees when these happen    PMHx: [] Unremarkable [] Diabetes [] HTN  [] Pacemaker   [] MI/Heart Problems [] Cancer [] Arthritis [] Other:              [x] Refer to full medical chart  In EPIC     Comorbidities:   [] Obesity [] Dialysis  [] N/A   [] Asthma/COPD [] Dementia [] Other:   [] Stroke [] Sleep apnea [] Other:   [] Vascular disease [] Rheumatic disease [] Other:     Tests: [x] X-Ray:  Narrative & Impression  History: kneee pain for 2 months     Findings:   KNEE X-RAY     4 views of the right knee including AP, tunnel, and lateral in the upright position, and skyline views reveal anatomic alignment with no fracture or dislocation.  Kellgren grade II changes of osteoarthritis (joint space narrowing, osteophyte, subchondral sclerosis, bony deformity/cyst) of the tricompartment(s).  No osseous loose bodies.  No bony erosion or periosteal reaction.  No soft tissue masses.  Spurring of the superior pole of the patella.     Impression: Negative radiographs of the right knee.    [x] MRI:  Impression: Mild to moderate multifocal osteoarthritis, greater than expected for patient's age.  Moderate synovitis of the knee joint could relate to mechanical osteoarthrosis and/or inflammatory arthropathy     Medications: [x] Refer to full medical record [] None [] Other:  Allergies:      [x] Refer to full medical record [] None [] Other:    Patient lives with: self   In what type of home []  One story   [x] Two story   [] Split level   Employer [employer]   Job Status [x]  Normal duty   [] Light duty   [] Off due to condition    []  Retired   [] Not employed   [] Disability  [] Other:  []

## 2024-07-22 ENCOUNTER — TELEPHONE (OUTPATIENT)
Dept: PRIMARY CARE CLINIC | Age: 54
End: 2024-07-22

## 2024-07-22 DIAGNOSIS — M47.817 DJD (DEGENERATIVE JOINT DISEASE), LUMBOSACRAL: ICD-10-CM

## 2024-07-22 DIAGNOSIS — M32.19 OTHER SYSTEMIC LUPUS ERYTHEMATOSUS WITH OTHER ORGAN INVOLVEMENT (HCC): ICD-10-CM

## 2024-07-22 RX ORDER — HYDROCODONE BITARTRATE AND ACETAMINOPHEN 5; 325 MG/1; MG/1
1 TABLET ORAL EVERY 8 HOURS PRN
Qty: 90 TABLET | Refills: 0 | Status: SHIPPED | OUTPATIENT
Start: 2024-07-22 | End: 2024-08-21

## 2024-07-22 NOTE — FLOWSHEET NOTE
strengthening and activity tolerance, assess tolerance to last session and progress as able    Frequency:  1-2 x/week for 18 visits       Time In:0801            Time Out: 0848    Electronically signed by:  Estee Youngblood PTA

## 2024-07-24 ENCOUNTER — HOSPITAL ENCOUNTER (OUTPATIENT)
Dept: PHYSICAL THERAPY | Facility: CLINIC | Age: 54
Setting detail: THERAPIES SERIES
Discharge: HOME | End: 2024-07-24
Payer: COMMERCIAL

## 2024-07-24 PROCEDURE — 97110 THERAPEUTIC EXERCISES: CPT

## 2024-07-29 ENCOUNTER — OFFICE VISIT (OUTPATIENT)
Dept: ORTHOPEDIC SURGERY | Age: 54
End: 2024-07-29
Payer: COMMERCIAL

## 2024-07-29 ENCOUNTER — HOSPITAL ENCOUNTER (OUTPATIENT)
Dept: PHYSICAL THERAPY | Facility: CLINIC | Age: 54
Setting detail: THERAPIES SERIES
Discharge: HOME | End: 2024-07-29
Payer: COMMERCIAL

## 2024-07-29 VITALS — BODY MASS INDEX: 36.55 KG/M2 | HEIGHT: 62 IN | RESPIRATION RATE: 16 BRPM | WEIGHT: 198.6 LBS | OXYGEN SATURATION: 98 %

## 2024-07-29 DIAGNOSIS — M17.11 OSTEOARTHRITIS OF RIGHT PATELLOFEMORAL JOINT: ICD-10-CM

## 2024-07-29 DIAGNOSIS — M65.9 SYNOVITIS OF RIGHT KNEE: ICD-10-CM

## 2024-07-29 DIAGNOSIS — M25.461 KNEE EFFUSION, RIGHT: Primary | ICD-10-CM

## 2024-07-29 PROCEDURE — 97110 THERAPEUTIC EXERCISES: CPT

## 2024-07-29 PROCEDURE — 99213 OFFICE O/P EST LOW 20 MIN: CPT | Performed by: PHYSICIAN ASSISTANT

## 2024-07-29 RX ORDER — PREDNISONE 5 MG/1
TABLET ORAL
COMMUNITY
Start: 2024-07-14

## 2024-07-29 ASSESSMENT — ENCOUNTER SYMPTOMS
VOMITING: 0
CHEST TIGHTNESS: 0
ABDOMINAL DISTENTION: 0
SHORTNESS OF BREATH: 0
APNEA: 0
GASTROINTESTINAL NEGATIVE: 1
COLOR CHANGE: 0
RESPIRATORY NEGATIVE: 1
ABDOMINAL PAIN: 0
DIARRHEA: 0
NAUSEA: 0
CONSTIPATION: 0
COUGH: 0

## 2024-07-29 NOTE — FLOWSHEET NOTE
[] [facility]  Outpatient Rehabilitation &  Therapy  [address]  [phone]  [phone] [] [facility]  Outpatient Rehabilitation &  Therapy  [address]  P: [phone]  F: [phone] [] [facility]  Outpatient Rehabilitation &  Therapy  [address]  P: [phone]  F: [phone] [x] [facility]  Outpatient Rehabilitation &  Therapy  [address]  [phone]  [phone] [] [facility]  Outpatient Rehabilitation &  Therapy  [address]   P: [phone]  F: [phone]  [] [facility]  Outpatient Rehabilitation &  Therapy  [address]  P: [phone]  F: [phone] [] [facility]  Outpatient Rehabilitation &  Therapy  [address]  P: [phone]  F: [phone] [] [facility]  Outpatient Rehabilitation &  Therapy  [address]  P: [phone]  F: [phone] [] [facility]  Outpatient Rehabilitation &  Therapy  [address]  P: [phone]  F: [phone]  [] [facility] Outpatient Rehabilitation &  Therapy  [address]  P: [phone]  F: [phone]     Physical Therapy Daily Treatment Note    Date:  24   Patient Name:  Sarah Heard    :  1970  MRN: [id number]  Physician: Gilma Galindo PA-C                              Insurance: BCBS; [place] yr; 40/40vs; no auth req; hard max; PT/OT comb; $20 copay; ded met; 4000/3202 remaining OOP   Medical Diagnosis: Osteoarthritis of right patellofemoral joint [M17.11], Knee effusion, right [M25.461]   Rehab Codes: M25.561, M25.661  Onset date: 2024             Next 's appt.: TBD  Visit# / total visits: 3/18    Cancels/No Shows: 0/0    Subjective:    Pain:  [] Yes  [x] No Location: R knee  Pain Rating: (0-10 scale) 0/10  Pain altered

## 2024-07-29 NOTE — PROGRESS NOTES
[facility] ORTHOPEDICS AND SPORTS MEDICINE  [address]  [place] [address]  Dept: [phone]  Dept Fax: [phone]        Ambulatory Follow Up      Subjective:   Sarah Heard is a 54 y.o. year old female who presents to our office today for routine followup regarding her   1. Knee effusion, right    2. Osteoarthritis of right patellofemoral joint    3. Synovitis of right knee    .    Chief Complaint   Patient presents with    Knee Pain     Right Knee Pain LI 7/10/24 (Swollen again?)         HPI Sarah Heard  is a 54 y.o.  female who presents today in follow for right knee pain and swelling.  The patient was last seen on 7/10/2024 and underwent treatment in the form of aspiration of the right knee and a cortisone injection.  She was also given a prescription for physical therapy.  She did go to her initial visit of physical therapy and 1 other session.  She did call and speak to my office staff on 7/15/2024 and stated that she was feeling much better.  The patient notes 90% improvement with the previous treatment but still has swelling but no pain.   She is on prednisone again from her rheumatologist for the lupus. She was taken off meloxicam and celebrex due to kidney issues.     Review of Systems   Constitutional:  Positive for activity change. Negative for appetite change, fatigue and fever.   Respiratory: Negative.  Negative for apnea, cough, chest tightness and shortness of breath.    Cardiovascular: Negative.  Negative for chest pain, palpitations and leg swelling.   Gastrointestinal: Negative.  Negative for abdominal distention, abdominal pain, constipation, diarrhea, nausea and vomiting.   Genitourinary: Negative.  Negative for difficulty urinating, dysuria and hematuria.   Musculoskeletal:  Positive for arthralgias and joint swelling. Negative for gait problem and myalgias.   Skin: Negative.  Negative for color change

## 2024-07-30 ENCOUNTER — TELEPHONE (OUTPATIENT)
Dept: ORTHOPEDIC SURGERY | Age: 54
End: 2024-07-30

## 2024-08-01 ENCOUNTER — HOSPITAL ENCOUNTER (OUTPATIENT)
Dept: PHYSICAL THERAPY | Facility: CLINIC | Age: 54
Setting detail: THERAPIES SERIES
Discharge: HOME | End: 2024-08-01
Payer: COMMERCIAL

## 2024-08-01 PROCEDURE — 97110 THERAPEUTIC EXERCISES: CPT

## 2024-08-01 NOTE — FLOWSHEET NOTE
[] [facility]  Outpatient Rehabilitation &  Therapy  [address]  [phone]  [phone] [] [facility]  Outpatient Rehabilitation &  Therapy  [address]  P: [phone]  F: [phone] [] [facility]  Outpatient Rehabilitation &  Therapy  [address]  P: [phone]  F: [phone] [x] [facility]  Outpatient Rehabilitation &  Therapy  [address]  [phone]  [phone] [] [facility]  Outpatient Rehabilitation &  Therapy  [address]   P: [phone]  F: [phone]  [] [facility]  Outpatient Rehabilitation &  Therapy  [address]  P: [phone]  F: [phone] [] [facility]  Outpatient Rehabilitation &  Therapy  [address]  P: [phone]  F: [phone] [] [facility]  Outpatient Rehabilitation &  Therapy  [address]  P: [phone]  F: [phone] [] [facility]  Outpatient Rehabilitation &  Therapy  [address]  P: [phone]  F: [phone]  [] [facility] Outpatient Rehabilitation &  Therapy  [address]  P: [phone]  F: [phone]     Physical Therapy Daily Treatment Note    Date:  24   Patient Name:  Sarah Heard    :  1970  MRN: [id number]  Physician: Gilma Galindo PA-C                              Insurance: BCBS; [employer] yr; 40/40vs; no auth req; hard max; PT/OT comb; $20 copay; ded met; 4000/3202 remaining OOP   Medical Diagnosis: Osteoarthritis of right patellofemoral joint [M17.11], Knee effusion, right [M25.461]   Rehab Codes: M25.561, M25.661  Onset date: 2024             Next 's appt.: TBD  Visit# / total visits:     Cancels/No Shows: 0/0    Subjective:    Pain:  [] Yes  [x] No Location: R knee  Pain Rating: (0-10 scale) 0/10  Pain altered

## 2024-08-05 ENCOUNTER — HOSPITAL ENCOUNTER (OUTPATIENT)
Dept: PHYSICAL THERAPY | Facility: CLINIC | Age: 54
Setting detail: THERAPIES SERIES
Discharge: HOME | End: 2024-08-05
Payer: COMMERCIAL

## 2024-08-05 PROCEDURE — 97110 THERAPEUTIC EXERCISES: CPT

## 2024-08-05 NOTE — FLOWSHEET NOTE
[] [facility]  Outpatient Rehabilitation &  Therapy  [address]  [phone]  [phone] [] [facility]  Outpatient Rehabilitation &  Therapy  [address]  P: [phone]  F: [phone] [] [facility]  Outpatient Rehabilitation &  Therapy  [address]  P: [phone]  F: [phone] [x] [facility]  Outpatient Rehabilitation &  Therapy  [address]  [phone]  [phone] [] [facility]  Outpatient Rehabilitation &  Therapy  [address]   P: [phone]  F: [phone]  [] [facility]  Outpatient Rehabilitation &  Therapy  [address]  P: [phone]  F: [phone] [] [facility]  Outpatient Rehabilitation &  Therapy  [address]  P: [phone]  F: [phone] [] [facility]  Outpatient Rehabilitation &  Therapy  [address]  P: [phone]  F: [phone] [] [facility]  Outpatient Rehabilitation &  Therapy  [address]  P: [phone]  F: [phone]  [] [facility] Outpatient Rehabilitation &  Therapy  [address]  P: [phone]  F: [phone]     Physical Therapy Daily Treatment Note    Date:  24   Patient Name:  Sarah Heard    :  1970  MRN: [id number]  Physician: Gilma Galindo PA-C                              Insurance: BCBS; [employer] yr; 40/40vs; no auth req; hard max; PT/OT comb; $20 copay; ded met; 4000/3202 remaining OOP   Medical Diagnosis: Osteoarthritis of right patellofemoral joint [M17.11], Knee effusion, right [M25.461]   Rehab Codes: M25.561, M25.661  Onset date: 2024             Next 's appt.: 24  Visit# / total visits:     Cancels/No Shows: 0/0    Subjective:    Pain:  [] Yes  [x] No Location: R knee  Pain Rating: (0-10 scale) 0/10  Pain altered

## 2024-08-06 ENCOUNTER — TELEPHONE (OUTPATIENT)
Dept: PRIMARY CARE CLINIC | Age: 54
End: 2024-08-06

## 2024-08-06 DIAGNOSIS — R25.2 SPASM: Primary | ICD-10-CM

## 2024-08-06 NOTE — TELEPHONE ENCOUNTER
Patient called in stating she has been having legs spasms like andrae horses as well as in her feet for a few days. Today is the first day she has had it in her right arm . States her right arm \" shot up like a arrow\" and wouldn't bent for a few minutes. Denies any facial drooping or chest pain. Please advise

## 2024-08-07 ENCOUNTER — HOSPITAL ENCOUNTER (OUTPATIENT)
Age: 54
Discharge: HOME | End: 2024-08-07
Payer: COMMERCIAL

## 2024-08-07 DIAGNOSIS — R25.2 SPASM: ICD-10-CM

## 2024-08-07 LAB
ALBUMIN SERPL-MCNC: 4.8 G/DL (ref 3.5–5.2)
ALBUMIN/GLOB SERPL: 2 {RATIO} (ref 1–2.5)
ALP SERPL-CCNC: 78 U/L (ref 35–104)
ALT SERPL-CCNC: 20 U/L (ref 10–35)
ANION GAP SERPL CALCULATED.3IONS-SCNC: 16 MMOL/L (ref 9–16)
AST SERPL-CCNC: 17 U/L (ref 10–35)
BILIRUB SERPL-MCNC: 0.6 MG/DL (ref 0–1.2)
BUN SERPL-MCNC: 19 MG/DL (ref 6–20)
CALCIUM SERPL-MCNC: 9.8 MG/DL (ref 8.6–10.4)
CHLORIDE SERPL-SCNC: 98 MMOL/L (ref 98–107)
CO2 SERPL-SCNC: 25 MMOL/L (ref 20–31)
ERYTHROCYTE [DISTWIDTH] IN BLOOD BY AUTOMATED COUNT: 15 % (ref 11.8–14.4)
GFR, ESTIMATED: 67 ML/MIN/1.73M2
GLUCOSE SERPL-MCNC: 257 MG/DL (ref 74–99)
HCT VFR BLD AUTO: 38.4 % (ref 36.3–47.1)
HGB BLD-MCNC: 12.6 G/DL (ref 11.9–15.1)
MAGNESIUM SERPL-MCNC: 2 MG/DL (ref 1.6–2.6)
MCH RBC QN AUTO: 28.9 PG (ref 25.2–33.5)
MCHC RBC AUTO-ENTMCNC: 32.8 G/DL (ref 28.4–34.8)
MCV RBC AUTO: 88.1 FL (ref 82.6–102.9)
NRBC BLD-RTO: 0 PER 100 WBC
PLATELET # BLD AUTO: 346 K/UL (ref 138–453)
PMV BLD AUTO: 10.1 FL (ref 8.1–13.5)
POTASSIUM SERPL-SCNC: 3.5 MMOL/L (ref 3.7–5.3)
PROT SERPL-MCNC: 7.5 G/DL (ref 6.6–8.7)
RBC # BLD AUTO: 4.36 M/UL (ref 3.95–5.11)
SODIUM SERPL-SCNC: 139 MMOL/L (ref 136–145)
WBC OTHER # BLD: 7.7 K/UL (ref 3.5–11.3)

## 2024-08-07 PROCEDURE — 80053 COMPREHEN METABOLIC PANEL: CPT

## 2024-08-07 PROCEDURE — 83735 ASSAY OF MAGNESIUM: CPT

## 2024-08-07 PROCEDURE — 36415 COLL VENOUS BLD VENIPUNCTURE: CPT

## 2024-08-07 PROCEDURE — 85027 COMPLETE CBC AUTOMATED: CPT

## 2024-08-08 ENCOUNTER — HOSPITAL ENCOUNTER (OUTPATIENT)
Dept: PHYSICAL THERAPY | Facility: CLINIC | Age: 54
Setting detail: THERAPIES SERIES
Discharge: HOME | End: 2024-08-08
Payer: COMMERCIAL

## 2024-08-08 PROCEDURE — 97110 THERAPEUTIC EXERCISES: CPT

## 2024-08-08 NOTE — FLOWSHEET NOTE
towards range needed for normal gait mechanics []  []  []      2.  ? Function: decrease LEFS to at least 55/80 to show progression towards PLOF []  []  []      3.  Patient to be independent with home exercise program as demonstrated by performance with correct form without cues.  []  []  []     4.   []  []  []     5.   []  []  []     6.   []  []  []            Date Addressed: TBD       LTG: To be met in 18 treatments        ? Pain: decrease average right knee pain to <2/10 to show progression towards PLOF []  []  []     2.  ? ROM: increase right knee flexion ROM to at least 115 degrees to show progression towards range needed for normal stair navigation mechanics []  []  []     3.  ? Function: decrease LEFS to at least 64/80 to show significant increase in lower extremity function []  []  []     4.  Patient will be able to stand more at work without increased pain  [] [] []    5.   [] [] []    6.   [] [] []           Pt goals: NA [] [] []          Pt. Education:  [x] Yes  [] No  [x] Reviewed Prior HEP/Ed  Method of Education: [x] Verbal  [x] Demo  [] Written  Comprehension of Education:  [x] Verbalizes understanding.  [x] Demonstrates understanding.  [] Needs review.  [x] Demonstrates/verbalizes HEP/Ed previously given.    Access Code: [email or web address]  URL: [email or web address]  Date: 07/24/2024  Prepared by: Estee    Exercises  - Seated Long Arc Quad  - 1 x daily - 7 x weekly - 2 sets - 10 reps  - Supine Active Straight Leg Raise  - 1 x daily - 7 x weekly - 2 sets - 10 reps  - Supine Bridge  - 1 x daily - 7 x weekly - 2 sets - 10 reps  - Sidelying Hip Abduction  - 1 x daily - 7 x weekly - 2 sets - 10 reps  - Clamshell  - 1 x daily - 7 x weekly - 2 sets - 10 reps  - Standing Hip Flexor Stretch with Foot Elevated  - 1 x daily - 7 x weekly - 2 sets - 30 seconds hold  - Prone Quadriceps Stretch with Strap  - 1 x daily - 7 x weekly - 2 sets - 10 reps  - Standing 3-Way Leg Reach with Resistance at Ankles and Counter

## 2024-08-12 ENCOUNTER — HOSPITAL ENCOUNTER (OUTPATIENT)
Dept: PHYSICAL THERAPY | Facility: CLINIC | Age: 54
Setting detail: THERAPIES SERIES
Discharge: HOME | End: 2024-08-12
Payer: COMMERCIAL

## 2024-08-12 PROCEDURE — 97110 THERAPEUTIC EXERCISES: CPT

## 2024-08-12 NOTE — FLOWSHEET NOTE
reps  - Standing 3-Way Leg Reach with Resistance at Ankles and Counter Support  - 1 x daily - 7 x weekly - 1-2 sets - 10 reps  - Mini Squat with Counter Support  - 1 x daily - 7 x weekly - 1-2 sets - 10 reps        Plan: [x] Continue current frequency toward long and short term goals.    [x] Specific Instructions for subsequent treatments: attempt suggested above, increase tolerance to squats    Frequency:  1-2 x/week for 18 visits       Time In:0800            Time Out: 0855    Electronically signed by:  Estee Youngblood PTA

## 2024-08-15 ENCOUNTER — HOSPITAL ENCOUNTER (OUTPATIENT)
Dept: PHYSICAL THERAPY | Facility: CLINIC | Age: 54
Setting detail: THERAPIES SERIES
Discharge: HOME | End: 2024-08-15
Payer: COMMERCIAL

## 2024-08-15 PROCEDURE — 97110 THERAPEUTIC EXERCISES: CPT

## 2024-08-15 NOTE — FLOWSHEET NOTE
[] [facility]  Outpatient Rehabilitation &  Therapy  [address]  [phone]  [phone] [] [facility]  Outpatient Rehabilitation &  Therapy  [address]  P: [phone]  F: [phone] [] [facility]  Outpatient Rehabilitation &  Therapy  [address]  P: [phone]  F: [phone] [x] [facility]  Outpatient Rehabilitation &  Therapy  [address]  [phone]  [phone] [] [facility]  Outpatient Rehabilitation &  Therapy  [address]   P: [phone]  F: [phone]  [] [facility]  Outpatient Rehabilitation &  Therapy  [address]  P: [phone]  F: [phone] [] [facility]  Outpatient Rehabilitation &  Therapy  [address]  P: [phone]  F: [phone] [] [facility]  Outpatient Rehabilitation &  Therapy  [address]  P: [phone]  F: [phone] [] [facility]  Outpatient Rehabilitation &  Therapy  [address]  P: [phone]  F: [phone]  [] [facility] Outpatient Rehabilitation &  Therapy  [address]  P: [phone]  F: [phone]     Physical Therapy Daily Treatment Note    Date:  08/15/24   Patient Name:  Sarah Heard    :  1970  MRN: [id number]  Physician: Gilma Galindo PA-C                              Insurance: [employer]; [place] yr; 40/40vs; no auth req; hard max; PT/OT comb; $20 copay; ded met; 4000/3202 remaining OOP   Medical Diagnosis: Osteoarthritis of right patellofemoral joint [M17.11], Knee effusion, right [M25.461]   Rehab Codes: M25.561, M25.661  Onset date: 2024             Next 's appt.: 24  Visit# / total visits:     Cancels/No Shows: 0/0    Subjective:    Pain:  [] Yes  [x] No Location: R knee  Pain Rating: (0-10 scale) 0/10  Pain altered

## 2024-08-19 ENCOUNTER — APPOINTMENT (OUTPATIENT)
Dept: PHYSICAL THERAPY | Facility: CLINIC | Age: 54
End: 2024-08-19
Payer: COMMERCIAL

## 2024-08-21 ENCOUNTER — OFFICE VISIT (OUTPATIENT)
Dept: PRIMARY CARE CLINIC | Age: 54
End: 2024-08-21
Payer: COMMERCIAL

## 2024-08-21 VITALS — DIASTOLIC BLOOD PRESSURE: 99 MMHG | HEART RATE: 87 BPM | SYSTOLIC BLOOD PRESSURE: 152 MMHG

## 2024-08-21 DIAGNOSIS — I10 ESSENTIAL HYPERTENSION: Primary | ICD-10-CM

## 2024-08-21 PROCEDURE — 3077F SYST BP >= 140 MM HG: CPT | Performed by: NURSE PRACTITIONER

## 2024-08-21 PROCEDURE — 99213 OFFICE O/P EST LOW 20 MIN: CPT | Performed by: NURSE PRACTITIONER

## 2024-08-21 PROCEDURE — 3080F DIAST BP >= 90 MM HG: CPT | Performed by: NURSE PRACTITIONER

## 2024-08-21 RX ORDER — NIFEDIPINE 60 MG/1
60 TABLET, EXTENDED RELEASE ORAL DAILY
Qty: 30 TABLET | Refills: 2 | Status: SHIPPED | OUTPATIENT
Start: 2024-08-21

## 2024-08-21 NOTE — PROGRESS NOTES
Sarah Heard (:  1970) is a 54 y.o. female,Established patient, here for evaluation of the following chief complaint(s):  Follow-up      Assessment & Plan   ASSESSMENT/PLAN:  1. Essential hypertension  Start nifedipine.   Rto in 1 month     Return in about 1 month (around 2024) for Hypertension.         Subjective   SUBJECTIVE/OBJECTIVE:  HPI  Declines flu shot    Review of Systems   Constitutional:  Negative for chills and fever.   Respiratory:  Negative for cough and shortness of breath.    Cardiovascular:  Negative for chest pain, palpitations and leg swelling.     Objective   Vitals:    24 1505   BP: (!) 152/99   Pulse: 87     Physical Exam  Constitutional:       Appearance: She is well-developed.   HENT:      Right Ear: External ear normal.      Left Ear: External ear normal.      Nose: Nose normal.   Cardiovascular:      Rate and Rhythm: Normal rate and regular rhythm.      Heart sounds: Normal heart sounds, S1 normal and S2 normal.   Pulmonary:      Effort: Pulmonary effort is normal. No respiratory distress.      Breath sounds: Normal breath sounds.   Musculoskeletal:         General: No deformity. Normal range of motion.      Cervical back: Full passive range of motion without pain and normal range of motion.   Skin:     General: Skin is warm and dry.   Neurological:      Mental Status: She is alert and oriented to person, place, and time.         An electronic signature was used to authenticate this note.    --ORVILLE VASQUEZ - CNP

## 2024-08-22 ENCOUNTER — APPOINTMENT (OUTPATIENT)
Dept: PHYSICAL THERAPY | Facility: CLINIC | Age: 54
End: 2024-08-22
Payer: COMMERCIAL

## 2024-08-26 ENCOUNTER — APPOINTMENT (OUTPATIENT)
Dept: PHYSICAL THERAPY | Facility: CLINIC | Age: 54
End: 2024-08-26
Payer: COMMERCIAL

## 2024-08-29 ENCOUNTER — APPOINTMENT (OUTPATIENT)
Dept: PHYSICAL THERAPY | Facility: CLINIC | Age: 54
End: 2024-08-29
Payer: COMMERCIAL

## 2024-09-02 DIAGNOSIS — E03.9 HYPOTHYROIDISM, UNSPECIFIED TYPE: ICD-10-CM

## 2024-09-03 ENCOUNTER — TELEPHONE (OUTPATIENT)
Dept: PRIMARY CARE CLINIC | Age: 54
End: 2024-09-03

## 2024-09-03 RX ORDER — LEVOTHYROXINE SODIUM 100 UG/1
100 TABLET ORAL DAILY
Qty: 90 TABLET | Refills: 1 | Status: SHIPPED | OUTPATIENT
Start: 2024-09-03 | End: 2025-09-03

## 2024-09-11 ENCOUNTER — OFFICE VISIT (OUTPATIENT)
Dept: PRIMARY CARE CLINIC | Age: 54
End: 2024-09-11
Payer: COMMERCIAL

## 2024-09-11 VITALS — DIASTOLIC BLOOD PRESSURE: 94 MMHG | HEART RATE: 82 BPM | OXYGEN SATURATION: 97 % | SYSTOLIC BLOOD PRESSURE: 136 MMHG

## 2024-09-11 DIAGNOSIS — M32.19 OTHER SYSTEMIC LUPUS ERYTHEMATOSUS WITH OTHER ORGAN INVOLVEMENT (HCC): ICD-10-CM

## 2024-09-11 DIAGNOSIS — E87.6 HYPOKALEMIA: Primary | ICD-10-CM

## 2024-09-11 DIAGNOSIS — R60.0 BILATERAL LOWER EXTREMITY EDEMA: ICD-10-CM

## 2024-09-11 PROCEDURE — 99214 OFFICE O/P EST MOD 30 MIN: CPT | Performed by: NURSE PRACTITIONER

## 2024-09-11 PROCEDURE — 3080F DIAST BP >= 90 MM HG: CPT | Performed by: NURSE PRACTITIONER

## 2024-09-11 PROCEDURE — 3075F SYST BP GE 130 - 139MM HG: CPT | Performed by: NURSE PRACTITIONER

## 2024-09-11 RX ORDER — FUROSEMIDE 20 MG
20 TABLET ORAL DAILY
Qty: 3 TABLET | Refills: 0 | Status: ON HOLD | OUTPATIENT
Start: 2024-09-11 | End: 2024-09-14

## 2024-09-11 RX ORDER — HYDROCODONE BITARTRATE AND ACETAMINOPHEN 5; 325 MG/1; MG/1
1 TABLET ORAL EVERY 8 HOURS PRN
Qty: 90 TABLET | Refills: 0 | Status: ON HOLD | OUTPATIENT
Start: 2024-09-11 | End: 2024-10-11

## 2024-09-11 RX ORDER — POTASSIUM CHLORIDE 1500 MG/1
20 TABLET, EXTENDED RELEASE ORAL DAILY
Qty: 3 TABLET | Refills: 0 | Status: ON HOLD | OUTPATIENT
Start: 2024-09-11 | End: 2024-09-14

## 2024-09-11 ASSESSMENT — ENCOUNTER SYMPTOMS
SHORTNESS OF BREATH: 0
COUGH: 0

## 2024-09-12 ENCOUNTER — ANESTHESIA (OUTPATIENT)
Dept: OPERATING ROOM | Age: 54
End: 2024-09-12
Payer: COMMERCIAL

## 2024-09-12 ENCOUNTER — APPOINTMENT (OUTPATIENT)
Dept: GENERAL RADIOLOGY | Age: 54
DRG: 336 | End: 2024-09-12
Payer: COMMERCIAL

## 2024-09-12 ENCOUNTER — ANESTHESIA EVENT (OUTPATIENT)
Dept: OPERATING ROOM | Age: 54
End: 2024-09-12
Payer: COMMERCIAL

## 2024-09-12 ENCOUNTER — APPOINTMENT (OUTPATIENT)
Dept: CT IMAGING | Age: 54
DRG: 336 | End: 2024-09-12
Payer: COMMERCIAL

## 2024-09-12 ENCOUNTER — HOSPITAL ENCOUNTER (INPATIENT)
Age: 54
LOS: 9 days | Discharge: HOME | DRG: 336 | End: 2024-09-21
Attending: EMERGENCY MEDICINE | Admitting: INTERNAL MEDICINE
Payer: COMMERCIAL

## 2024-09-12 DIAGNOSIS — G89.18 POST-OPERATIVE PAIN: ICD-10-CM

## 2024-09-12 DIAGNOSIS — K56.609 SBO (SMALL BOWEL OBSTRUCTION) (HCC): Primary | ICD-10-CM

## 2024-09-12 DIAGNOSIS — R60.9 EDEMA, UNSPECIFIED TYPE: ICD-10-CM

## 2024-09-12 PROBLEM — I10 PRIMARY HYPERTENSION: Status: ACTIVE | Noted: 2024-09-12

## 2024-09-12 PROBLEM — E87.6 HYPOKALEMIA: Status: ACTIVE | Noted: 2024-09-12

## 2024-09-12 LAB
ALBUMIN SERPL-MCNC: 4.6 G/DL (ref 3.5–5.2)
ALP SERPL-CCNC: 93 U/L (ref 35–104)
ALT SERPL-CCNC: 24 U/L (ref 5–33)
ANION GAP SERPL CALCULATED.3IONS-SCNC: 17 MMOL/L (ref 9–17)
AST SERPL-CCNC: 17 U/L
BASOPHILS # BLD: 0.05 K/UL (ref 0–0.2)
BASOPHILS NFR BLD: 0 % (ref 0–2)
BILIRUB DIRECT SERPL-MCNC: 0.2 MG/DL
BILIRUB INDIRECT SERPL-MCNC: 0.4 MG/DL (ref 0–1)
BILIRUB SERPL-MCNC: 0.6 MG/DL (ref 0.3–1.2)
BILIRUB UR QL STRIP: NEGATIVE
BUN SERPL-MCNC: 9 MG/DL (ref 6–20)
BUN/CREAT SERPL: 11 (ref 9–20)
CALCIUM SERPL-MCNC: 9.9 MG/DL (ref 8.6–10.4)
CHLORIDE SERPL-SCNC: 96 MMOL/L (ref 98–107)
CLARITY UR: CLEAR
CO2 SERPL-SCNC: 26 MMOL/L (ref 20–31)
COLOR UR: YELLOW
CREAT SERPL-MCNC: 0.8 MG/DL (ref 0.5–0.9)
EOSINOPHIL # BLD: <0.03 K/UL (ref 0–0.44)
EOSINOPHILS RELATIVE PERCENT: 0 % (ref 1–4)
EPI CELLS #/AREA URNS HPF: ABNORMAL /HPF (ref 0–5)
ERYTHROCYTE [DISTWIDTH] IN BLOOD BY AUTOMATED COUNT: 15.7 % (ref 11.8–14.4)
GFR, ESTIMATED: 86 ML/MIN/1.73M2
GLUCOSE SERPL-MCNC: 154 MG/DL (ref 70–99)
GLUCOSE UR STRIP-MCNC: NEGATIVE MG/DL
HCT VFR BLD AUTO: 36.2 % (ref 36.3–47.1)
HGB BLD-MCNC: 12.3 G/DL (ref 11.9–15.1)
HGB UR QL STRIP.AUTO: ABNORMAL
IMM GRANULOCYTES # BLD AUTO: 0.07 K/UL (ref 0–0.3)
IMM GRANULOCYTES NFR BLD: 1 %
KETONES UR STRIP-MCNC: NEGATIVE MG/DL
LACTATE BLDV-SCNC: 2.3 MMOL/L (ref 0.5–1.9)
LACTATE BLDV-SCNC: 2.8 MMOL/L (ref 0.5–2.2)
LACTATE BLDV-SCNC: 3.4 MMOL/L (ref 0.5–2.2)
LEUKOCYTE ESTERASE UR QL STRIP: NEGATIVE
LIPASE SERPL-CCNC: 10 U/L (ref 13–60)
LYMPHOCYTES NFR BLD: 1.32 K/UL (ref 1.1–3.7)
LYMPHOCYTES RELATIVE PERCENT: 9 % (ref 24–43)
MAGNESIUM SERPL-MCNC: 2.1 MG/DL (ref 1.6–2.6)
MCH RBC QN AUTO: 30.4 PG (ref 25.2–33.5)
MCHC RBC AUTO-ENTMCNC: 34 G/DL (ref 28.4–34.8)
MCV RBC AUTO: 89.4 FL (ref 82.6–102.9)
MONOCYTES NFR BLD: 0.89 K/UL (ref 0.1–1.2)
MONOCYTES NFR BLD: 6 % (ref 3–12)
MUCOUS THREADS URNS QL MICRO: ABNORMAL
NEUTROPHILS NFR BLD: 84 % (ref 36–65)
NEUTS SEG NFR BLD: 11.77 K/UL (ref 1.5–8.1)
NITRITE UR QL STRIP: NEGATIVE
NRBC BLD-RTO: 0 PER 100 WBC
PH UR STRIP: 5.5 [PH] (ref 5–8)
PLATELET # BLD AUTO: 500 K/UL (ref 138–453)
PMV BLD AUTO: 9.9 FL (ref 8.1–13.5)
POTASSIUM SERPL-SCNC: 3.5 MMOL/L (ref 3.7–5.3)
PROT SERPL-MCNC: 8 G/DL (ref 6.4–8.3)
PROT UR STRIP-MCNC: ABNORMAL MG/DL
RBC # BLD AUTO: 4.05 M/UL (ref 3.95–5.11)
RBC # BLD: ABNORMAL 10*6/UL
RBC #/AREA URNS HPF: ABNORMAL /HPF (ref 0–2)
SODIUM SERPL-SCNC: 139 MMOL/L (ref 135–144)
SP GR UR STRIP: 1.02 (ref 1–1.03)
TSH SERPL DL<=0.05 MIU/L-ACNC: 3.22 UIU/ML (ref 0.3–5)
UROBILINOGEN UR STRIP-ACNC: NORMAL EU/DL (ref 0–1)
WBC #/AREA URNS HPF: ABNORMAL /HPF (ref 0–5)
WBC OTHER # BLD: 14.1 K/UL (ref 3.5–11.3)

## 2024-09-12 PROCEDURE — 6360000002 HC RX W HCPCS: Performed by: ANESTHESIOLOGY

## 2024-09-12 PROCEDURE — 2500000003 HC RX 250 WO HCPCS: Performed by: NURSE PRACTITIONER

## 2024-09-12 PROCEDURE — 6370000000 HC RX 637 (ALT 250 FOR IP): Performed by: INTERNAL MEDICINE

## 2024-09-12 PROCEDURE — 7100000001 HC PACU RECOVERY - ADDTL 15 MIN: Performed by: SURGERY

## 2024-09-12 PROCEDURE — 0WQF0ZZ REPAIR ABDOMINAL WALL, OPEN APPROACH: ICD-10-PCS | Performed by: SURGERY

## 2024-09-12 PROCEDURE — 74018 RADEX ABDOMEN 1 VIEW: CPT

## 2024-09-12 PROCEDURE — 6360000002 HC RX W HCPCS: Performed by: NURSE PRACTITIONER

## 2024-09-12 PROCEDURE — 99222 1ST HOSP IP/OBS MODERATE 55: CPT | Performed by: INTERNAL MEDICINE

## 2024-09-12 PROCEDURE — 2720000010 HC SURG SUPPLY STERILE: Performed by: SURGERY

## 2024-09-12 PROCEDURE — 85025 COMPLETE CBC W/AUTO DIFF WBC: CPT

## 2024-09-12 PROCEDURE — 7100000000 HC PACU RECOVERY - FIRST 15 MIN: Performed by: SURGERY

## 2024-09-12 PROCEDURE — 0DN80ZZ RELEASE SMALL INTESTINE, OPEN APPROACH: ICD-10-PCS | Performed by: SURGERY

## 2024-09-12 PROCEDURE — 2500000003 HC RX 250 WO HCPCS: Performed by: INTERNAL MEDICINE

## 2024-09-12 PROCEDURE — 2709999900 HC NON-CHARGEABLE SUPPLY: Performed by: SURGERY

## 2024-09-12 PROCEDURE — 2580000003 HC RX 258: Performed by: NURSE PRACTITIONER

## 2024-09-12 PROCEDURE — 80048 BASIC METABOLIC PNL TOTAL CA: CPT

## 2024-09-12 PROCEDURE — C1765 ADHESION BARRIER: HCPCS | Performed by: SURGERY

## 2024-09-12 PROCEDURE — 80076 HEPATIC FUNCTION PANEL: CPT

## 2024-09-12 PROCEDURE — 96374 THER/PROPH/DIAG INJ IV PUSH: CPT

## 2024-09-12 PROCEDURE — 83605 ASSAY OF LACTIC ACID: CPT

## 2024-09-12 PROCEDURE — 6360000004 HC RX CONTRAST MEDICATION: Performed by: NURSE PRACTITIONER

## 2024-09-12 PROCEDURE — 74177 CT ABD & PELVIS W/CONTRAST: CPT

## 2024-09-12 PROCEDURE — 81001 URINALYSIS AUTO W/SCOPE: CPT

## 2024-09-12 PROCEDURE — 3600000012 HC SURGERY LEVEL 2 ADDTL 15MIN: Performed by: SURGERY

## 2024-09-12 PROCEDURE — 2500000003 HC RX 250 WO HCPCS: Performed by: ANESTHESIOLOGY

## 2024-09-12 PROCEDURE — 6370000000 HC RX 637 (ALT 250 FOR IP): Performed by: EMERGENCY MEDICINE

## 2024-09-12 PROCEDURE — 83735 ASSAY OF MAGNESIUM: CPT

## 2024-09-12 PROCEDURE — 83690 ASSAY OF LIPASE: CPT

## 2024-09-12 PROCEDURE — 96375 TX/PRO/DX INJ NEW DRUG ADDON: CPT

## 2024-09-12 PROCEDURE — 99285 EMERGENCY DEPT VISIT HI MDM: CPT

## 2024-09-12 PROCEDURE — 6360000002 HC RX W HCPCS: Performed by: STUDENT IN AN ORGANIZED HEALTH CARE EDUCATION/TRAINING PROGRAM

## 2024-09-12 PROCEDURE — 3700000001 HC ADD 15 MINUTES (ANESTHESIA): Performed by: SURGERY

## 2024-09-12 PROCEDURE — 3700000000 HC ANESTHESIA ATTENDED CARE: Performed by: SURGERY

## 2024-09-12 PROCEDURE — 2580000003 HC RX 258: Performed by: ANESTHESIOLOGY

## 2024-09-12 PROCEDURE — 84443 ASSAY THYROID STIM HORMONE: CPT

## 2024-09-12 PROCEDURE — 3600000002 HC SURGERY LEVEL 2 BASE: Performed by: SURGERY

## 2024-09-12 PROCEDURE — 2060000000 HC ICU INTERMEDIATE R&B

## 2024-09-12 DEVICE — SEPRAFILM ADHESION BARRIER (MEMBRANE) IS A STERILE, BIORESORBABLE, TRANSLUCENT ADHESION BARRIER COMPOSED OF TWO ANIONIC POLYSACCHARIDES, SODIUM HYALURONATE (HA) AND CARBOXYMETHYLCELLULOSE (CMC).
Type: IMPLANTABLE DEVICE | Site: ABDOMEN | Status: FUNCTIONAL
Brand: SEPRAFILM

## 2024-09-12 RX ORDER — ONDANSETRON 2 MG/ML
4 INJECTION INTRAMUSCULAR; INTRAVENOUS EVERY 6 HOURS PRN
Status: DISCONTINUED | OUTPATIENT
Start: 2024-09-12 | End: 2024-09-21 | Stop reason: HOSPADM

## 2024-09-12 RX ORDER — OXYCODONE HYDROCHLORIDE 5 MG/1
5 TABLET ORAL
Status: DISCONTINUED | OUTPATIENT
Start: 2024-09-12 | End: 2024-09-13 | Stop reason: HOSPADM

## 2024-09-12 RX ORDER — SODIUM CHLORIDE 0.9 % (FLUSH) 0.9 %
5-40 SYRINGE (ML) INJECTION EVERY 12 HOURS SCHEDULED
Status: DISCONTINUED | OUTPATIENT
Start: 2024-09-12 | End: 2024-09-17 | Stop reason: SDUPTHER

## 2024-09-12 RX ORDER — PROPOFOL 10 MG/ML
INJECTION, EMULSION INTRAVENOUS
Status: DISCONTINUED | OUTPATIENT
Start: 2024-09-12 | End: 2024-09-13 | Stop reason: SDUPTHER

## 2024-09-12 RX ORDER — FENTANYL CITRATE 50 UG/ML
25 INJECTION, SOLUTION INTRAMUSCULAR; INTRAVENOUS EVERY 5 MIN PRN
Status: DISCONTINUED | OUTPATIENT
Start: 2024-09-12 | End: 2024-09-13 | Stop reason: HOSPADM

## 2024-09-12 RX ORDER — HALOPERIDOL 5 MG/ML
1 INJECTION INTRAMUSCULAR
Status: DISCONTINUED | OUTPATIENT
Start: 2024-09-12 | End: 2024-09-13 | Stop reason: HOSPADM

## 2024-09-12 RX ORDER — ACETAMINOPHEN 325 MG/1
650 TABLET ORAL EVERY 6 HOURS PRN
Status: DISCONTINUED | OUTPATIENT
Start: 2024-09-12 | End: 2024-09-13

## 2024-09-12 RX ORDER — DEXAMETHASONE SODIUM PHOSPHATE 10 MG/ML
INJECTION, SOLUTION INTRAMUSCULAR; INTRAVENOUS
Status: DISCONTINUED | OUTPATIENT
Start: 2024-09-12 | End: 2024-09-13 | Stop reason: SDUPTHER

## 2024-09-12 RX ORDER — MAGNESIUM SULFATE 1 G/100ML
1000 INJECTION INTRAVENOUS PRN
Status: DISCONTINUED | OUTPATIENT
Start: 2024-09-12 | End: 2024-09-21 | Stop reason: HOSPADM

## 2024-09-12 RX ORDER — POTASSIUM CHLORIDE 7.45 MG/ML
10 INJECTION INTRAVENOUS
Status: DISPENSED | OUTPATIENT
Start: 2024-09-12 | End: 2024-09-12

## 2024-09-12 RX ORDER — ATORVASTATIN CALCIUM 10 MG/1
10 TABLET, FILM COATED ORAL DAILY
Status: DISCONTINUED | OUTPATIENT
Start: 2024-09-12 | End: 2024-09-21 | Stop reason: HOSPADM

## 2024-09-12 RX ORDER — SODIUM CHLORIDE 0.9 % (FLUSH) 0.9 %
5-40 SYRINGE (ML) INJECTION PRN
Status: DISCONTINUED | OUTPATIENT
Start: 2024-09-12 | End: 2024-09-13 | Stop reason: HOSPADM

## 2024-09-12 RX ORDER — METOPROLOL TARTRATE 25 MG/1
75 TABLET, FILM COATED ORAL 2 TIMES DAILY
Status: DISCONTINUED | OUTPATIENT
Start: 2024-09-12 | End: 2024-09-21 | Stop reason: HOSPADM

## 2024-09-12 RX ORDER — HYDROCHLOROTHIAZIDE 12.5 MG/1
12.5 TABLET ORAL DAILY
Status: DISCONTINUED | OUTPATIENT
Start: 2024-09-12 | End: 2024-09-21 | Stop reason: HOSPADM

## 2024-09-12 RX ORDER — HYDRALAZINE HYDROCHLORIDE 20 MG/ML
INJECTION INTRAMUSCULAR; INTRAVENOUS
Status: DISCONTINUED | OUTPATIENT
Start: 2024-09-12 | End: 2024-09-13 | Stop reason: SDUPTHER

## 2024-09-12 RX ORDER — ONDANSETRON 2 MG/ML
4 INJECTION INTRAMUSCULAR; INTRAVENOUS ONCE
Status: COMPLETED | OUTPATIENT
Start: 2024-09-12 | End: 2024-09-12

## 2024-09-12 RX ORDER — METOCLOPRAMIDE HYDROCHLORIDE 5 MG/ML
10 INJECTION INTRAMUSCULAR; INTRAVENOUS
Status: DISCONTINUED | OUTPATIENT
Start: 2024-09-12 | End: 2024-09-13 | Stop reason: HOSPADM

## 2024-09-12 RX ORDER — LIDOCAINE HYDROCHLORIDE 20 MG/ML
INJECTION, SOLUTION EPIDURAL; INFILTRATION; INTRACAUDAL; PERINEURAL
Status: DISCONTINUED | OUTPATIENT
Start: 2024-09-12 | End: 2024-09-13 | Stop reason: SDUPTHER

## 2024-09-12 RX ORDER — CYCLOBENZAPRINE HCL 10 MG
10 TABLET ORAL NIGHTLY PRN
Status: DISCONTINUED | OUTPATIENT
Start: 2024-09-12 | End: 2024-09-21 | Stop reason: HOSPADM

## 2024-09-12 RX ORDER — LEVOTHYROXINE SODIUM 100 UG/1
100 TABLET ORAL DAILY
Status: DISCONTINUED | OUTPATIENT
Start: 2024-09-12 | End: 2024-09-21 | Stop reason: HOSPADM

## 2024-09-12 RX ORDER — POLYETHYLENE GLYCOL 3350 17 G/17G
17 POWDER, FOR SOLUTION ORAL DAILY PRN
Status: DISCONTINUED | OUTPATIENT
Start: 2024-09-12 | End: 2024-09-13

## 2024-09-12 RX ORDER — SIMETHICONE 40MG/0.6ML
80 SUSPENSION, DROPS(FINAL DOSAGE FORM)(ML) ORAL EVERY 6 HOURS PRN
Status: DISCONTINUED | OUTPATIENT
Start: 2024-09-12 | End: 2024-09-13

## 2024-09-12 RX ORDER — 0.9 % SODIUM CHLORIDE 0.9 %
80 INTRAVENOUS SOLUTION INTRAVENOUS ONCE
Status: COMPLETED | OUTPATIENT
Start: 2024-09-12 | End: 2024-09-12

## 2024-09-12 RX ORDER — NIFEDIPINE 30 MG/1
60 TABLET, EXTENDED RELEASE ORAL DAILY
Status: DISCONTINUED | OUTPATIENT
Start: 2024-09-12 | End: 2024-09-21 | Stop reason: HOSPADM

## 2024-09-12 RX ORDER — SODIUM CHLORIDE, SODIUM LACTATE, POTASSIUM CHLORIDE, CALCIUM CHLORIDE 600; 310; 30; 20 MG/100ML; MG/100ML; MG/100ML; MG/100ML
INJECTION, SOLUTION INTRAVENOUS
Status: DISCONTINUED | OUTPATIENT
Start: 2024-09-12 | End: 2024-09-13 | Stop reason: SDUPTHER

## 2024-09-12 RX ORDER — SUCCINYLCHOLINE/SOD CL,ISO/PF 100 MG/5ML
SYRINGE (ML) INTRAVENOUS
Status: DISCONTINUED | OUTPATIENT
Start: 2024-09-12 | End: 2024-09-13 | Stop reason: SDUPTHER

## 2024-09-12 RX ORDER — HYDROMORPHONE HYDROCHLORIDE 1 MG/ML
0.5 INJECTION, SOLUTION INTRAMUSCULAR; INTRAVENOUS; SUBCUTANEOUS EVERY 5 MIN PRN
Status: DISCONTINUED | OUTPATIENT
Start: 2024-09-12 | End: 2024-09-13 | Stop reason: HOSPADM

## 2024-09-12 RX ORDER — SODIUM CHLORIDE 9 MG/ML
INJECTION, SOLUTION INTRAVENOUS CONTINUOUS
Status: DISCONTINUED | OUTPATIENT
Start: 2024-09-12 | End: 2024-09-12

## 2024-09-12 RX ORDER — ROCURONIUM BROMIDE 10 MG/ML
INJECTION, SOLUTION INTRAVENOUS
Status: DISCONTINUED | OUTPATIENT
Start: 2024-09-12 | End: 2024-09-13 | Stop reason: SDUPTHER

## 2024-09-12 RX ORDER — IOPAMIDOL 755 MG/ML
75 INJECTION, SOLUTION INTRAVASCULAR
Status: COMPLETED | OUTPATIENT
Start: 2024-09-12 | End: 2024-09-12

## 2024-09-12 RX ORDER — ENOXAPARIN SODIUM 100 MG/ML
40 INJECTION SUBCUTANEOUS DAILY
Status: DISCONTINUED | OUTPATIENT
Start: 2024-09-12 | End: 2024-09-21 | Stop reason: HOSPADM

## 2024-09-12 RX ORDER — SODIUM CHLORIDE 0.9 % (FLUSH) 0.9 %
5-40 SYRINGE (ML) INJECTION EVERY 12 HOURS SCHEDULED
Status: DISCONTINUED | OUTPATIENT
Start: 2024-09-13 | End: 2024-09-13 | Stop reason: HOSPADM

## 2024-09-12 RX ORDER — ACETAMINOPHEN 650 MG/1
650 SUPPOSITORY RECTAL EVERY 6 HOURS PRN
Status: DISCONTINUED | OUTPATIENT
Start: 2024-09-12 | End: 2024-09-13

## 2024-09-12 RX ORDER — SODIUM CHLORIDE 0.9 % (FLUSH) 0.9 %
10 SYRINGE (ML) INJECTION PRN
Status: DISCONTINUED | OUTPATIENT
Start: 2024-09-12 | End: 2024-09-12

## 2024-09-12 RX ORDER — POTASSIUM CHLORIDE 7.45 MG/ML
10 INJECTION INTRAVENOUS
Status: DISCONTINUED | OUTPATIENT
Start: 2024-09-12 | End: 2024-09-12

## 2024-09-12 RX ORDER — ONDANSETRON 4 MG/1
4 TABLET, ORALLY DISINTEGRATING ORAL EVERY 8 HOURS PRN
Status: DISCONTINUED | OUTPATIENT
Start: 2024-09-12 | End: 2024-09-21 | Stop reason: HOSPADM

## 2024-09-12 RX ORDER — HYDROMORPHONE HYDROCHLORIDE 2 MG/ML
INJECTION, SOLUTION INTRAMUSCULAR; INTRAVENOUS; SUBCUTANEOUS
Status: DISCONTINUED | OUTPATIENT
Start: 2024-09-12 | End: 2024-09-13 | Stop reason: SDUPTHER

## 2024-09-12 RX ORDER — SODIUM CHLORIDE 9 MG/ML
INJECTION, SOLUTION INTRAVENOUS PRN
Status: DISCONTINUED | OUTPATIENT
Start: 2024-09-12 | End: 2024-09-13 | Stop reason: HOSPADM

## 2024-09-12 RX ORDER — 0.9 % SODIUM CHLORIDE 0.9 %
2000 INTRAVENOUS SOLUTION INTRAVENOUS ONCE
Status: COMPLETED | OUTPATIENT
Start: 2024-09-12 | End: 2024-09-12

## 2024-09-12 RX ORDER — POTASSIUM CHLORIDE 1500 MG/1
40 TABLET, EXTENDED RELEASE ORAL PRN
Status: DISCONTINUED | OUTPATIENT
Start: 2024-09-12 | End: 2024-09-21 | Stop reason: HOSPADM

## 2024-09-12 RX ORDER — POTASSIUM CHLORIDE 7.45 MG/ML
10 INJECTION INTRAVENOUS PRN
Status: DISCONTINUED | OUTPATIENT
Start: 2024-09-12 | End: 2024-09-21 | Stop reason: HOSPADM

## 2024-09-12 RX ORDER — SODIUM CHLORIDE 9 MG/ML
INJECTION, SOLUTION INTRAVENOUS PRN
Status: DISCONTINUED | OUTPATIENT
Start: 2024-09-12 | End: 2024-09-17 | Stop reason: SDUPTHER

## 2024-09-12 RX ORDER — SODIUM CHLORIDE 0.9 % (FLUSH) 0.9 %
10 SYRINGE (ML) INJECTION PRN
Status: DISCONTINUED | OUTPATIENT
Start: 2024-09-12 | End: 2024-09-17 | Stop reason: SDUPTHER

## 2024-09-12 RX ORDER — 0.9 % SODIUM CHLORIDE 0.9 %
1000 INTRAVENOUS SOLUTION INTRAVENOUS ONCE
Status: DISCONTINUED | OUTPATIENT
Start: 2024-09-12 | End: 2024-09-12

## 2024-09-12 RX ORDER — DEXTROSE MONOHYDRATE, SODIUM CHLORIDE, AND POTASSIUM CHLORIDE 50; 1.49; 9 G/1000ML; G/1000ML; G/1000ML
INJECTION, SOLUTION INTRAVENOUS CONTINUOUS
Status: DISPENSED | OUTPATIENT
Start: 2024-09-12 | End: 2024-09-13

## 2024-09-12 RX ORDER — HYDRALAZINE HYDROCHLORIDE 20 MG/ML
10 INJECTION INTRAMUSCULAR; INTRAVENOUS EVERY 6 HOURS PRN
Status: DISCONTINUED | OUTPATIENT
Start: 2024-09-12 | End: 2024-09-21 | Stop reason: HOSPADM

## 2024-09-12 RX ORDER — METOPROLOL TARTRATE 1 MG/ML
5 INJECTION, SOLUTION INTRAVENOUS EVERY 6 HOURS
Status: DISCONTINUED | OUTPATIENT
Start: 2024-09-12 | End: 2024-09-16

## 2024-09-12 RX ORDER — ONDANSETRON 2 MG/ML
INJECTION INTRAMUSCULAR; INTRAVENOUS
Status: DISCONTINUED | OUTPATIENT
Start: 2024-09-12 | End: 2024-09-13 | Stop reason: SDUPTHER

## 2024-09-12 RX ORDER — FENTANYL CITRATE 50 UG/ML
INJECTION, SOLUTION INTRAMUSCULAR; INTRAVENOUS
Status: DISCONTINUED | OUTPATIENT
Start: 2024-09-12 | End: 2024-09-13 | Stop reason: SDUPTHER

## 2024-09-12 RX ORDER — NALOXONE HYDROCHLORIDE 0.4 MG/ML
INJECTION, SOLUTION INTRAMUSCULAR; INTRAVENOUS; SUBCUTANEOUS PRN
Status: DISCONTINUED | OUTPATIENT
Start: 2024-09-12 | End: 2024-09-13 | Stop reason: HOSPADM

## 2024-09-12 RX ADMIN — SODIUM CHLORIDE, POTASSIUM CHLORIDE, SODIUM LACTATE AND CALCIUM CHLORIDE: 600; 310; 30; 20 INJECTION, SOLUTION INTRAVENOUS at 22:15

## 2024-09-12 RX ADMIN — Medication 100 MG: at 21:14

## 2024-09-12 RX ADMIN — SUGAMMADEX 200 MG: 100 INJECTION, SOLUTION INTRAVENOUS at 23:56

## 2024-09-12 RX ADMIN — FAMOTIDINE 20 MG: 10 INJECTION, SOLUTION INTRAVENOUS at 10:16

## 2024-09-12 RX ADMIN — ONDANSETRON 4 MG: 2 INJECTION, SOLUTION INTRAMUSCULAR; INTRAVENOUS at 13:33

## 2024-09-12 RX ADMIN — HYDROMORPHONE HYDROCHLORIDE 0.5 MG: 2 INJECTION, SOLUTION INTRAMUSCULAR; INTRAVENOUS; SUBCUTANEOUS at 22:18

## 2024-09-12 RX ADMIN — FENTANYL CITRATE 50 MCG: 50 INJECTION INTRAMUSCULAR; INTRAVENOUS at 21:14

## 2024-09-12 RX ADMIN — METOPROLOL TARTRATE 5 MG: 5 INJECTION INTRAVENOUS at 16:59

## 2024-09-12 RX ADMIN — HYDROMORPHONE HYDROCHLORIDE 0.5 MG: 2 INJECTION, SOLUTION INTRAMUSCULAR; INTRAVENOUS; SUBCUTANEOUS at 21:37

## 2024-09-12 RX ADMIN — HYDRALAZINE HYDROCHLORIDE 10 MG: 20 INJECTION INTRAMUSCULAR; INTRAVENOUS at 22:55

## 2024-09-12 RX ADMIN — LIDOCAINE HYDROCHLORIDE 5 ML: 20 INJECTION, SOLUTION EPIDURAL; INFILTRATION; INTRACAUDAL; PERINEURAL at 21:14

## 2024-09-12 RX ADMIN — FENTANYL CITRATE 25 MCG: 50 INJECTION INTRAMUSCULAR; INTRAVENOUS at 21:23

## 2024-09-12 RX ADMIN — SODIUM CHLORIDE: 9 INJECTION, SOLUTION INTRAVENOUS at 10:10

## 2024-09-12 RX ADMIN — PIPERACILLIN AND TAZOBACTAM 3375 MG: 3; .375 INJECTION, POWDER, LYOPHILIZED, FOR SOLUTION INTRAVENOUS at 20:29

## 2024-09-12 RX ADMIN — IOPAMIDOL 75 ML: 755 INJECTION, SOLUTION INTRAVENOUS at 11:41

## 2024-09-12 RX ADMIN — HYDROMORPHONE HYDROCHLORIDE 0.5 MG: 2 INJECTION, SOLUTION INTRAMUSCULAR; INTRAVENOUS; SUBCUTANEOUS at 21:40

## 2024-09-12 RX ADMIN — Medication 80 MG: at 18:53

## 2024-09-12 RX ADMIN — HYDROMORPHONE HYDROCHLORIDE 0.5 MG: 2 INJECTION, SOLUTION INTRAMUSCULAR; INTRAVENOUS; SUBCUTANEOUS at 22:42

## 2024-09-12 RX ADMIN — ONDANSETRON 4 MG: 2 INJECTION, SOLUTION INTRAMUSCULAR; INTRAVENOUS at 23:45

## 2024-09-12 RX ADMIN — PROPOFOL 180 MG: 10 INJECTION, EMULSION INTRAVENOUS at 21:14

## 2024-09-12 RX ADMIN — DEXAMETHASONE SODIUM PHOSPHATE 10 MG: 10 INJECTION INTRAMUSCULAR; INTRAVENOUS at 21:18

## 2024-09-12 RX ADMIN — POTASSIUM CHLORIDE 10 MEQ: 7.46 INJECTION, SOLUTION INTRAVENOUS at 15:41

## 2024-09-12 RX ADMIN — POTASSIUM CHLORIDE, DEXTROSE MONOHYDRATE AND SODIUM CHLORIDE: 150; 5; 900 INJECTION, SOLUTION INTRAVENOUS at 19:59

## 2024-09-12 RX ADMIN — SODIUM CHLORIDE, POTASSIUM CHLORIDE, SODIUM LACTATE AND CALCIUM CHLORIDE: 600; 310; 30; 20 INJECTION, SOLUTION INTRAVENOUS at 21:02

## 2024-09-12 RX ADMIN — SODIUM CHLORIDE, POTASSIUM CHLORIDE, SODIUM LACTATE AND CALCIUM CHLORIDE: 600; 310; 30; 20 INJECTION, SOLUTION INTRAVENOUS at 23:17

## 2024-09-12 RX ADMIN — SODIUM CHLORIDE, PRESERVATIVE FREE 10 ML: 5 INJECTION INTRAVENOUS at 11:41

## 2024-09-12 RX ADMIN — PIPERACILLIN AND TAZOBACTAM 4500 MG: 4; .5 INJECTION, POWDER, LYOPHILIZED, FOR SOLUTION INTRAVENOUS at 14:51

## 2024-09-12 RX ADMIN — POTASSIUM CHLORIDE 10 MEQ: 7.46 INJECTION, SOLUTION INTRAVENOUS at 18:53

## 2024-09-12 RX ADMIN — ROCURONIUM BROMIDE 10 MG: 10 INJECTION, SOLUTION INTRAVENOUS at 22:54

## 2024-09-12 RX ADMIN — ONDANSETRON 4 MG: 2 INJECTION, SOLUTION INTRAMUSCULAR; INTRAVENOUS at 10:13

## 2024-09-12 RX ADMIN — ENOXAPARIN SODIUM 40 MG: 100 INJECTION SUBCUTANEOUS at 17:49

## 2024-09-12 RX ADMIN — POTASSIUM CHLORIDE 10 MEQ: 7.46 INJECTION, SOLUTION INTRAVENOUS at 17:51

## 2024-09-12 RX ADMIN — HYDROMORPHONE HYDROCHLORIDE 0.5 MG: 2 INJECTION, SOLUTION INTRAMUSCULAR; INTRAVENOUS; SUBCUTANEOUS at 22:08

## 2024-09-12 RX ADMIN — BENZOCAINE, BUTAMBEN, AND TETRACAINE HYDROCHLORIDE 1 SPRAY: .028; .004; .004 AEROSOL, SPRAY TOPICAL at 14:23

## 2024-09-12 RX ADMIN — SODIUM CHLORIDE: 9 INJECTION, SOLUTION INTRAVENOUS at 17:51

## 2024-09-12 RX ADMIN — SODIUM CHLORIDE 80 ML: 9 INJECTION, SOLUTION INTRAVENOUS at 11:41

## 2024-09-12 RX ADMIN — ROCURONIUM BROMIDE 40 MG: 10 INJECTION, SOLUTION INTRAVENOUS at 21:16

## 2024-09-12 RX ADMIN — SODIUM CHLORIDE 1000 ML: 9 INJECTION, SOLUTION INTRAVENOUS at 14:46

## 2024-09-12 RX ADMIN — SODIUM CHLORIDE: 9 INJECTION, SOLUTION INTRAVENOUS at 17:50

## 2024-09-12 ASSESSMENT — PAIN SCALES - GENERAL: PAINLEVEL_OUTOF10: 6

## 2024-09-13 ENCOUNTER — APPOINTMENT (OUTPATIENT)
Dept: VASCULAR LAB | Age: 54
DRG: 336 | End: 2024-09-13
Payer: COMMERCIAL

## 2024-09-13 ENCOUNTER — APPOINTMENT (OUTPATIENT)
Dept: INTERVENTIONAL RADIOLOGY/VASCULAR | Age: 54
DRG: 336 | End: 2024-09-13
Payer: COMMERCIAL

## 2024-09-13 PROBLEM — K56.50 SMALL BOWEL OBSTRUCTION DUE TO ADHESIONS (HCC): Status: ACTIVE | Noted: 2024-09-12

## 2024-09-13 LAB
ANION GAP SERPL CALCULATED.3IONS-SCNC: 14 MMOL/L (ref 9–17)
BASOPHILS # BLD: 0 K/UL (ref 0–0.2)
BASOPHILS NFR BLD: 0 % (ref 0–2)
BUN SERPL-MCNC: 9 MG/DL (ref 6–20)
BUN/CREAT SERPL: 10 (ref 9–20)
CALCIUM SERPL-MCNC: 8.1 MG/DL (ref 8.6–10.4)
CHLORIDE SERPL-SCNC: 105 MMOL/L (ref 98–107)
CO2 SERPL-SCNC: 22 MMOL/L (ref 20–31)
CREAT SERPL-MCNC: 0.9 MG/DL (ref 0.5–0.9)
EOSINOPHIL # BLD: 0 K/UL (ref 0–0.44)
EOSINOPHILS RELATIVE PERCENT: 0 % (ref 1–4)
ERYTHROCYTE [DISTWIDTH] IN BLOOD BY AUTOMATED COUNT: 16.5 % (ref 11.8–14.4)
GFR, ESTIMATED: 75 ML/MIN/1.73M2
GLUCOSE BLD-MCNC: 153 MG/DL (ref 65–105)
GLUCOSE SERPL-MCNC: 213 MG/DL (ref 70–99)
HCT VFR BLD AUTO: 32.7 % (ref 36.3–47.1)
HGB BLD-MCNC: 10.9 G/DL (ref 11.9–15.1)
IMM GRANULOCYTES # BLD AUTO: 0.15 K/UL (ref 0–0.3)
IMM GRANULOCYTES NFR BLD: 1 %
LYMPHOCYTES NFR BLD: 0.6 K/UL (ref 1.1–3.7)
LYMPHOCYTES RELATIVE PERCENT: 4 % (ref 24–43)
MAGNESIUM SERPL-MCNC: 1.9 MG/DL (ref 1.6–2.6)
MCH RBC QN AUTO: 31 PG (ref 25.2–33.5)
MCHC RBC AUTO-ENTMCNC: 33.3 G/DL (ref 28.4–34.8)
MCV RBC AUTO: 92.9 FL (ref 82.6–102.9)
MONOCYTES NFR BLD: 1.06 K/UL (ref 0.1–1.2)
MONOCYTES NFR BLD: 7 % (ref 3–12)
NEUTROPHILS NFR BLD: 88 % (ref 36–65)
NEUTS SEG NFR BLD: 13.29 K/UL (ref 1.5–8.1)
NRBC BLD-RTO: 0.1 PER 100 WBC
PHOSPHATE SERPL-MCNC: 2.4 MG/DL (ref 2.6–4.5)
PLATELET # BLD AUTO: 425 K/UL (ref 138–453)
PMV BLD AUTO: 9.4 FL (ref 8.1–13.5)
POTASSIUM SERPL-SCNC: 4.6 MMOL/L (ref 3.7–5.3)
RBC # BLD AUTO: 3.52 M/UL (ref 3.95–5.11)
SODIUM SERPL-SCNC: 141 MMOL/L (ref 135–144)
WBC OTHER # BLD: 15.1 K/UL (ref 3.5–11.3)

## 2024-09-13 PROCEDURE — B5181ZA FLUOROSCOPY OF SUPERIOR VENA CAVA USING LOW OSMOLAR CONTRAST, GUIDANCE: ICD-10-PCS | Performed by: STUDENT IN AN ORGANIZED HEALTH CARE EDUCATION/TRAINING PROGRAM

## 2024-09-13 PROCEDURE — 82947 ASSAY GLUCOSE BLOOD QUANT: CPT

## 2024-09-13 PROCEDURE — C1751 CATH, INF, PER/CENT/MIDLINE: HCPCS

## 2024-09-13 PROCEDURE — 2580000003 HC RX 258: Performed by: INTERNAL MEDICINE

## 2024-09-13 PROCEDURE — 36573 INSJ PICC RS&I 5 YR+: CPT

## 2024-09-13 PROCEDURE — 2500000003 HC RX 250 WO HCPCS

## 2024-09-13 PROCEDURE — 99232 SBSQ HOSP IP/OBS MODERATE 35: CPT | Performed by: INTERNAL MEDICINE

## 2024-09-13 PROCEDURE — 2580000003 HC RX 258: Performed by: STUDENT IN AN ORGANIZED HEALTH CARE EDUCATION/TRAINING PROGRAM

## 2024-09-13 PROCEDURE — 2500000003 HC RX 250 WO HCPCS: Performed by: STUDENT IN AN ORGANIZED HEALTH CARE EDUCATION/TRAINING PROGRAM

## 2024-09-13 PROCEDURE — 80048 BASIC METABOLIC PNL TOTAL CA: CPT

## 2024-09-13 PROCEDURE — 83735 ASSAY OF MAGNESIUM: CPT

## 2024-09-13 PROCEDURE — 2580000003 HC RX 258: Performed by: RADIOLOGY

## 2024-09-13 PROCEDURE — 02HV33Z INSERTION OF INFUSION DEVICE INTO SUPERIOR VENA CAVA, PERCUTANEOUS APPROACH: ICD-10-PCS | Performed by: STUDENT IN AN ORGANIZED HEALTH CARE EDUCATION/TRAINING PROGRAM

## 2024-09-13 PROCEDURE — 93970 EXTREMITY STUDY: CPT

## 2024-09-13 PROCEDURE — 6370000000 HC RX 637 (ALT 250 FOR IP): Performed by: STUDENT IN AN ORGANIZED HEALTH CARE EDUCATION/TRAINING PROGRAM

## 2024-09-13 PROCEDURE — 6360000002 HC RX W HCPCS: Performed by: STUDENT IN AN ORGANIZED HEALTH CARE EDUCATION/TRAINING PROGRAM

## 2024-09-13 PROCEDURE — 2060000000 HC ICU INTERMEDIATE R&B

## 2024-09-13 PROCEDURE — 6360000002 HC RX W HCPCS

## 2024-09-13 PROCEDURE — 87506 IADNA-DNA/RNA PROBE TQ 6-11: CPT

## 2024-09-13 PROCEDURE — 2580000003 HC RX 258

## 2024-09-13 PROCEDURE — 84100 ASSAY OF PHOSPHORUS: CPT

## 2024-09-13 PROCEDURE — 36415 COLL VENOUS BLD VENIPUNCTURE: CPT

## 2024-09-13 PROCEDURE — 85025 COMPLETE CBC W/AUTO DIFF WBC: CPT

## 2024-09-13 PROCEDURE — 6360000002 HC RX W HCPCS: Performed by: ANESTHESIOLOGY

## 2024-09-13 RX ORDER — SODIUM CHLORIDE 0.9 % (FLUSH) 0.9 %
5-40 SYRINGE (ML) INJECTION EVERY 12 HOURS SCHEDULED
Status: DISCONTINUED | OUTPATIENT
Start: 2024-09-13 | End: 2024-09-21 | Stop reason: HOSPADM

## 2024-09-13 RX ORDER — KETOROLAC TROMETHAMINE 15 MG/ML
15 INJECTION, SOLUTION INTRAMUSCULAR; INTRAVENOUS EVERY 6 HOURS
Status: COMPLETED | OUTPATIENT
Start: 2024-09-13 | End: 2024-09-17

## 2024-09-13 RX ORDER — FOLIC ACID 1 MG/1
1 TABLET ORAL DAILY
COMMUNITY

## 2024-09-13 RX ORDER — SODIUM CHLORIDE 9 MG/ML
INJECTION, SOLUTION INTRAVENOUS CONTINUOUS
Status: DISCONTINUED | OUTPATIENT
Start: 2024-09-13 | End: 2024-09-20

## 2024-09-13 RX ORDER — SODIUM CHLORIDE 9 MG/ML
INJECTION, SOLUTION INTRAVENOUS PRN
Status: DISCONTINUED | OUTPATIENT
Start: 2024-09-13 | End: 2024-09-21 | Stop reason: HOSPADM

## 2024-09-13 RX ORDER — LABETALOL HYDROCHLORIDE 5 MG/ML
10 INJECTION, SOLUTION INTRAVENOUS ONCE
Status: DISCONTINUED | OUTPATIENT
Start: 2024-09-13 | End: 2024-09-13 | Stop reason: HOSPADM

## 2024-09-13 RX ORDER — SODIUM CHLORIDE 0.9 % (FLUSH) 0.9 %
5-40 SYRINGE (ML) INJECTION PRN
Status: DISCONTINUED | OUTPATIENT
Start: 2024-09-13 | End: 2024-09-21 | Stop reason: HOSPADM

## 2024-09-13 RX ORDER — INSULIN LISPRO 100 [IU]/ML
0-4 INJECTION, SOLUTION INTRAVENOUS; SUBCUTANEOUS EVERY 6 HOURS
Status: DISCONTINUED | OUTPATIENT
Start: 2024-09-13 | End: 2024-09-21

## 2024-09-13 RX ORDER — HYDROMORPHONE HYDROCHLORIDE 1 MG/ML
1 INJECTION, SOLUTION INTRAMUSCULAR; INTRAVENOUS; SUBCUTANEOUS
Status: DISCONTINUED | OUTPATIENT
Start: 2024-09-13 | End: 2024-09-17

## 2024-09-13 RX ORDER — BISACODYL 10 MG
10 SUPPOSITORY, RECTAL RECTAL ONCE
Status: COMPLETED | OUTPATIENT
Start: 2024-09-13 | End: 2024-09-13

## 2024-09-13 RX ORDER — METHOTREXATE 2.5 MG/1
10 TABLET ORAL WEEKLY
COMMUNITY

## 2024-09-13 RX ORDER — MAGNESIUM SULFATE IN WATER 40 MG/ML
2000 INJECTION, SOLUTION INTRAVENOUS ONCE
Status: COMPLETED | OUTPATIENT
Start: 2024-09-13 | End: 2024-09-13

## 2024-09-13 RX ORDER — DEXTROSE MONOHYDRATE 100 MG/ML
INJECTION, SOLUTION INTRAVENOUS CONTINUOUS PRN
Status: DISCONTINUED | OUTPATIENT
Start: 2024-09-13 | End: 2024-09-21 | Stop reason: HOSPADM

## 2024-09-13 RX ADMIN — SODIUM CHLORIDE, PRESERVATIVE FREE 10 ML: 5 INJECTION INTRAVENOUS at 20:00

## 2024-09-13 RX ADMIN — METOPROLOL TARTRATE 5 MG: 5 INJECTION INTRAVENOUS at 11:18

## 2024-09-13 RX ADMIN — POTASSIUM CHLORIDE, DEXTROSE MONOHYDRATE AND SODIUM CHLORIDE: 150; 5; 900 INJECTION, SOLUTION INTRAVENOUS at 01:23

## 2024-09-13 RX ADMIN — POTASSIUM CHLORIDE, DEXTROSE MONOHYDRATE AND SODIUM CHLORIDE: 150; 5; 900 INJECTION, SOLUTION INTRAVENOUS at 09:00

## 2024-09-13 RX ADMIN — METOPROLOL TARTRATE 5 MG: 5 INJECTION INTRAVENOUS at 17:05

## 2024-09-13 RX ADMIN — BISACODYL 10 MG: 10 SUPPOSITORY RECTAL at 02:08

## 2024-09-13 RX ADMIN — KETOROLAC TROMETHAMINE 15 MG: 15 INJECTION, SOLUTION INTRAMUSCULAR; INTRAVENOUS at 14:20

## 2024-09-13 RX ADMIN — SODIUM CHLORIDE, PRESERVATIVE FREE 10 ML: 5 INJECTION INTRAVENOUS at 08:28

## 2024-09-13 RX ADMIN — SODIUM CHLORIDE, PRESERVATIVE FREE 10 ML: 5 INJECTION INTRAVENOUS at 21:56

## 2024-09-13 RX ADMIN — MAGNESIUM SULFATE HEPTAHYDRATE 2000 MG: 40 INJECTION, SOLUTION INTRAVENOUS at 09:08

## 2024-09-13 RX ADMIN — KETOROLAC TROMETHAMINE 15 MG: 15 INJECTION, SOLUTION INTRAMUSCULAR; INTRAVENOUS at 01:27

## 2024-09-13 RX ADMIN — SODIUM CHLORIDE: 9 INJECTION, SOLUTION INTRAVENOUS at 09:06

## 2024-09-13 RX ADMIN — KETOROLAC TROMETHAMINE 15 MG: 15 INJECTION, SOLUTION INTRAMUSCULAR; INTRAVENOUS at 08:27

## 2024-09-13 RX ADMIN — SODIUM CHLORIDE: 9 INJECTION, SOLUTION INTRAVENOUS at 16:52

## 2024-09-13 RX ADMIN — PIPERACILLIN AND TAZOBACTAM 3375 MG: 3; .375 INJECTION, POWDER, LYOPHILIZED, FOR SOLUTION INTRAVENOUS at 14:22

## 2024-09-13 RX ADMIN — POTASSIUM PHOSPHATE, MONOBASIC POTASSIUM PHOSPHATE, DIBASIC: 224; 236 INJECTION, SOLUTION, CONCENTRATE INTRAVENOUS at 16:57

## 2024-09-13 RX ADMIN — PIPERACILLIN AND TAZOBACTAM 3375 MG: 3; .375 INJECTION, POWDER, LYOPHILIZED, FOR SOLUTION INTRAVENOUS at 04:55

## 2024-09-13 RX ADMIN — HYDROMORPHONE HYDROCHLORIDE 0.25 MG: 2 INJECTION, SOLUTION INTRAMUSCULAR; INTRAVENOUS; SUBCUTANEOUS at 00:16

## 2024-09-13 RX ADMIN — ENOXAPARIN SODIUM 40 MG: 100 INJECTION SUBCUTANEOUS at 08:27

## 2024-09-13 RX ADMIN — PIPERACILLIN AND TAZOBACTAM 3375 MG: 3; .375 INJECTION, POWDER, LYOPHILIZED, FOR SOLUTION INTRAVENOUS at 20:31

## 2024-09-13 RX ADMIN — HYDROMORPHONE HYDROCHLORIDE 0.25 MG: 2 INJECTION, SOLUTION INTRAMUSCULAR; INTRAVENOUS; SUBCUTANEOUS at 00:08

## 2024-09-13 RX ADMIN — KETOROLAC TROMETHAMINE 15 MG: 15 INJECTION, SOLUTION INTRAMUSCULAR; INTRAVENOUS at 20:23

## 2024-09-13 RX ADMIN — METOPROLOL TARTRATE 5 MG: 5 INJECTION INTRAVENOUS at 05:37

## 2024-09-13 RX ADMIN — SODIUM CHLORIDE, PRESERVATIVE FREE 10 ML: 5 INJECTION INTRAVENOUS at 20:26

## 2024-09-13 ASSESSMENT — PAIN DESCRIPTION - DESCRIPTORS
DESCRIPTORS: NAGGING
DESCRIPTORS: CRAMPING

## 2024-09-13 ASSESSMENT — PAIN DESCRIPTION - LOCATION
LOCATION: ABDOMEN

## 2024-09-13 ASSESSMENT — PAIN - FUNCTIONAL ASSESSMENT
PAIN_FUNCTIONAL_ASSESSMENT: PREVENTS OR INTERFERES SOME ACTIVE ACTIVITIES AND ADLS
PAIN_FUNCTIONAL_ASSESSMENT: PREVENTS OR INTERFERES SOME ACTIVE ACTIVITIES AND ADLS

## 2024-09-13 ASSESSMENT — PAIN SCALES - GENERAL
PAINLEVEL_OUTOF10: 3
PAINLEVEL_OUTOF10: 0
PAINLEVEL_OUTOF10: 2

## 2024-09-13 ASSESSMENT — PAIN DESCRIPTION - ORIENTATION: ORIENTATION: MID

## 2024-09-14 ENCOUNTER — APPOINTMENT (OUTPATIENT)
Dept: GENERAL RADIOLOGY | Age: 54
DRG: 336 | End: 2024-09-14
Payer: COMMERCIAL

## 2024-09-14 LAB
ALBUMIN SERPL-MCNC: 3.4 G/DL (ref 3.5–5.2)
ALP SERPL-CCNC: 60 U/L (ref 35–104)
ALT SERPL-CCNC: 14 U/L (ref 5–33)
ANION GAP SERPL CALCULATED.3IONS-SCNC: 9 MMOL/L (ref 9–17)
AST SERPL-CCNC: 17 U/L
BASOPHILS # BLD: 0.04 K/UL (ref 0–0.2)
BASOPHILS NFR BLD: 0 % (ref 0–2)
BILIRUB DIRECT SERPL-MCNC: <0.1 MG/DL
BILIRUB INDIRECT SERPL-MCNC: ABNORMAL MG/DL (ref 0–1)
BILIRUB SERPL-MCNC: 0.2 MG/DL (ref 0.3–1.2)
BUN SERPL-MCNC: 10 MG/DL (ref 6–20)
BUN/CREAT SERPL: 14 (ref 9–20)
CALCIUM SERPL-MCNC: 8.4 MG/DL (ref 8.6–10.4)
CHLORIDE SERPL-SCNC: 106 MMOL/L (ref 98–107)
CO2 SERPL-SCNC: 27 MMOL/L (ref 20–31)
CREAT SERPL-MCNC: 0.7 MG/DL (ref 0.5–0.9)
EOSINOPHIL # BLD: 0.04 K/UL (ref 0–0.44)
EOSINOPHILS RELATIVE PERCENT: 0 % (ref 1–4)
ERYTHROCYTE [DISTWIDTH] IN BLOOD BY AUTOMATED COUNT: 16.9 % (ref 11.8–14.4)
GFR, ESTIMATED: >90 ML/MIN/1.73M2
GLUCOSE BLD-MCNC: 131 MG/DL (ref 65–105)
GLUCOSE BLD-MCNC: 136 MG/DL (ref 65–105)
GLUCOSE BLD-MCNC: 157 MG/DL (ref 65–105)
GLUCOSE BLD-MCNC: 159 MG/DL (ref 65–105)
GLUCOSE SERPL-MCNC: 142 MG/DL (ref 70–99)
HCO3 VENOUS: 25.8 MMOL/L (ref 22–29)
HCT VFR BLD AUTO: 26.9 % (ref 36.3–47.1)
HGB BLD-MCNC: 8.5 G/DL (ref 11.9–15.1)
IMM GRANULOCYTES # BLD AUTO: 0.11 K/UL (ref 0–0.3)
IMM GRANULOCYTES NFR BLD: 1 %
LYMPHOCYTES NFR BLD: 1.64 K/UL (ref 1.1–3.7)
LYMPHOCYTES RELATIVE PERCENT: 15 % (ref 24–43)
MAGNESIUM SERPL-MCNC: 2.2 MG/DL (ref 1.6–2.6)
MCH RBC QN AUTO: 30.4 PG (ref 25.2–33.5)
MCHC RBC AUTO-ENTMCNC: 31.6 G/DL (ref 28.4–34.8)
MCV RBC AUTO: 96.1 FL (ref 82.6–102.9)
MONOCYTES NFR BLD: 1.31 K/UL (ref 0.1–1.2)
MONOCYTES NFR BLD: 12 % (ref 3–12)
NEUTROPHILS NFR BLD: 72 % (ref 36–65)
NEUTS SEG NFR BLD: 7.92 K/UL (ref 1.5–8.1)
NRBC BLD-RTO: 0 PER 100 WBC
O2 SAT, VEN: 99.7 % (ref 60–85)
PCO2 VENOUS: 31 MM HG (ref 41–51)
PH VENOUS: 7.53 (ref 7.32–7.43)
PHOSPHATE SERPL-MCNC: 1.7 MG/DL (ref 2.6–4.5)
PLATELET # BLD AUTO: 290 K/UL (ref 138–453)
PMV BLD AUTO: 9.5 FL (ref 8.1–13.5)
PO2 VENOUS: 170.7 MM HG (ref 30–50)
POSITIVE BASE EXCESS, VEN: 3.2 MMOL/L (ref 0–3)
POTASSIUM SERPL-SCNC: 3.8 MMOL/L (ref 3.7–5.3)
PROT SERPL-MCNC: 5.9 G/DL (ref 6.4–8.3)
RBC # BLD AUTO: 2.8 M/UL (ref 3.95–5.11)
RBC # BLD: ABNORMAL 10*6/UL
SODIUM SERPL-SCNC: 142 MMOL/L (ref 135–144)
TRIGL SERPL-MCNC: 364 MG/DL
WBC OTHER # BLD: 11.1 K/UL (ref 3.5–11.3)

## 2024-09-14 PROCEDURE — 82803 BLOOD GASES ANY COMBINATION: CPT

## 2024-09-14 PROCEDURE — 97162 PT EVAL MOD COMPLEX 30 MIN: CPT

## 2024-09-14 PROCEDURE — 2580000003 HC RX 258: Performed by: RADIOLOGY

## 2024-09-14 PROCEDURE — 84100 ASSAY OF PHOSPHORUS: CPT

## 2024-09-14 PROCEDURE — 84478 ASSAY OF TRIGLYCERIDES: CPT

## 2024-09-14 PROCEDURE — 2060000000 HC ICU INTERMEDIATE R&B

## 2024-09-14 PROCEDURE — 6370000000 HC RX 637 (ALT 250 FOR IP): Performed by: STUDENT IN AN ORGANIZED HEALTH CARE EDUCATION/TRAINING PROGRAM

## 2024-09-14 PROCEDURE — 74018 RADEX ABDOMEN 1 VIEW: CPT

## 2024-09-14 PROCEDURE — 6360000002 HC RX W HCPCS: Performed by: STUDENT IN AN ORGANIZED HEALTH CARE EDUCATION/TRAINING PROGRAM

## 2024-09-14 PROCEDURE — 97116 GAIT TRAINING THERAPY: CPT

## 2024-09-14 PROCEDURE — 2580000003 HC RX 258: Performed by: INTERNAL MEDICINE

## 2024-09-14 PROCEDURE — 2580000003 HC RX 258

## 2024-09-14 PROCEDURE — 2580000003 HC RX 258: Performed by: STUDENT IN AN ORGANIZED HEALTH CARE EDUCATION/TRAINING PROGRAM

## 2024-09-14 PROCEDURE — 83735 ASSAY OF MAGNESIUM: CPT

## 2024-09-14 PROCEDURE — 80048 BASIC METABOLIC PNL TOTAL CA: CPT

## 2024-09-14 PROCEDURE — 85025 COMPLETE CBC W/AUTO DIFF WBC: CPT

## 2024-09-14 PROCEDURE — 2500000003 HC RX 250 WO HCPCS: Performed by: STUDENT IN AN ORGANIZED HEALTH CARE EDUCATION/TRAINING PROGRAM

## 2024-09-14 PROCEDURE — 36592 COLLECT BLOOD FROM PICC: CPT

## 2024-09-14 PROCEDURE — 99232 SBSQ HOSP IP/OBS MODERATE 35: CPT | Performed by: INTERNAL MEDICINE

## 2024-09-14 PROCEDURE — 80076 HEPATIC FUNCTION PANEL: CPT

## 2024-09-14 RX ORDER — ACETAMINOPHEN 650 MG/1
650 SUPPOSITORY RECTAL EVERY 4 HOURS PRN
Status: DISCONTINUED | OUTPATIENT
Start: 2024-09-14 | End: 2024-09-17

## 2024-09-14 RX ORDER — BISACODYL 10 MG
10 SUPPOSITORY, RECTAL RECTAL ONCE
Status: COMPLETED | OUTPATIENT
Start: 2024-09-14 | End: 2024-09-14

## 2024-09-14 RX ADMIN — METOPROLOL TARTRATE 5 MG: 5 INJECTION INTRAVENOUS at 01:00

## 2024-09-14 RX ADMIN — ENOXAPARIN SODIUM 40 MG: 100 INJECTION SUBCUTANEOUS at 09:29

## 2024-09-14 RX ADMIN — ONDANSETRON 4 MG: 2 INJECTION INTRAMUSCULAR; INTRAVENOUS at 16:00

## 2024-09-14 RX ADMIN — KETOROLAC TROMETHAMINE 15 MG: 15 INJECTION, SOLUTION INTRAMUSCULAR; INTRAVENOUS at 20:03

## 2024-09-14 RX ADMIN — SODIUM CHLORIDE, PRESERVATIVE FREE 10 ML: 5 INJECTION INTRAVENOUS at 20:04

## 2024-09-14 RX ADMIN — KETOROLAC TROMETHAMINE 15 MG: 15 INJECTION, SOLUTION INTRAMUSCULAR; INTRAVENOUS at 05:55

## 2024-09-14 RX ADMIN — KETOROLAC TROMETHAMINE 15 MG: 15 INJECTION, SOLUTION INTRAMUSCULAR; INTRAVENOUS at 01:00

## 2024-09-14 RX ADMIN — BISACODYL 10 MG: 10 SUPPOSITORY RECTAL at 09:39

## 2024-09-14 RX ADMIN — SODIUM CHLORIDE, PRESERVATIVE FREE 10 ML: 5 INJECTION INTRAVENOUS at 08:28

## 2024-09-14 RX ADMIN — METOPROLOL TARTRATE 5 MG: 5 INJECTION INTRAVENOUS at 05:54

## 2024-09-14 RX ADMIN — DEXTROSE MONOHYDRATE: 100 INJECTION, SOLUTION INTRAVENOUS at 18:52

## 2024-09-14 RX ADMIN — METOPROLOL TARTRATE 5 MG: 5 INJECTION INTRAVENOUS at 18:53

## 2024-09-14 RX ADMIN — SODIUM CHLORIDE, PRESERVATIVE FREE 10 ML: 5 INJECTION INTRAVENOUS at 13:34

## 2024-09-14 RX ADMIN — ONDANSETRON 4 MG: 2 INJECTION INTRAMUSCULAR; INTRAVENOUS at 05:59

## 2024-09-14 RX ADMIN — METOPROLOL TARTRATE 5 MG: 5 INJECTION INTRAVENOUS at 12:37

## 2024-09-14 RX ADMIN — SODIUM CHLORIDE: 9 INJECTION, SOLUTION INTRAVENOUS at 15:11

## 2024-09-14 RX ADMIN — SODIUM CHLORIDE: 9 INJECTION, SOLUTION INTRAVENOUS at 01:00

## 2024-09-14 RX ADMIN — KETOROLAC TROMETHAMINE 15 MG: 15 INJECTION, SOLUTION INTRAMUSCULAR; INTRAVENOUS at 13:34

## 2024-09-14 ASSESSMENT — PAIN DESCRIPTION - ORIENTATION
ORIENTATION: MID
ORIENTATION: RIGHT;LEFT
ORIENTATION: MID
ORIENTATION: MID
ORIENTATION: RIGHT;LEFT

## 2024-09-14 ASSESSMENT — PAIN DESCRIPTION - LOCATION
LOCATION: ABDOMEN

## 2024-09-14 ASSESSMENT — PAIN DESCRIPTION - DESCRIPTORS
DESCRIPTORS: PRESSURE;NAGGING
DESCRIPTORS: ACHING
DESCRIPTORS: ACHING;PRESSURE
DESCRIPTORS: ACHING;PRESSURE
DESCRIPTORS: ACHING

## 2024-09-14 ASSESSMENT — PAIN - FUNCTIONAL ASSESSMENT
PAIN_FUNCTIONAL_ASSESSMENT: ACTIVITIES ARE NOT PREVENTED
PAIN_FUNCTIONAL_ASSESSMENT: ACTIVITIES ARE NOT PREVENTED
PAIN_FUNCTIONAL_ASSESSMENT: PREVENTS OR INTERFERES SOME ACTIVE ACTIVITIES AND ADLS

## 2024-09-14 ASSESSMENT — PAIN SCALES - GENERAL
PAINLEVEL_OUTOF10: 3
PAINLEVEL_OUTOF10: 3
PAINLEVEL_OUTOF10: 0
PAINLEVEL_OUTOF10: 0
PAINLEVEL_OUTOF10: 1
PAINLEVEL_OUTOF10: 1
PAINLEVEL_OUTOF10: 3

## 2024-09-15 ENCOUNTER — APPOINTMENT (OUTPATIENT)
Dept: GENERAL RADIOLOGY | Age: 54
DRG: 336 | End: 2024-09-15
Payer: COMMERCIAL

## 2024-09-15 LAB
ALBUMIN SERPL-MCNC: 3.1 G/DL (ref 3.5–5.2)
ALP SERPL-CCNC: 65 U/L (ref 35–104)
ALT SERPL-CCNC: 14 U/L (ref 5–33)
ANION GAP SERPL CALCULATED.3IONS-SCNC: 10 MMOL/L (ref 9–17)
AST SERPL-CCNC: 18 U/L
BASOPHILS # BLD: 0.05 K/UL (ref 0–0.2)
BASOPHILS NFR BLD: 0 % (ref 0–2)
BILIRUB DIRECT SERPL-MCNC: 0.1 MG/DL
BILIRUB INDIRECT SERPL-MCNC: 0.2 MG/DL (ref 0–1)
BILIRUB SERPL-MCNC: 0.3 MG/DL (ref 0.3–1.2)
BUN SERPL-MCNC: 6 MG/DL (ref 6–20)
BUN/CREAT SERPL: 9 (ref 9–20)
CALCIUM SERPL-MCNC: 8.1 MG/DL (ref 8.6–10.4)
CHLORIDE SERPL-SCNC: 101 MMOL/L (ref 98–107)
CO2 SERPL-SCNC: 27 MMOL/L (ref 20–31)
CREAT SERPL-MCNC: 0.7 MG/DL (ref 0.5–0.9)
ECHO BSA: 1.98 M2
EOSINOPHIL # BLD: 0.22 K/UL (ref 0–0.44)
EOSINOPHILS RELATIVE PERCENT: 2 % (ref 1–4)
ERYTHROCYTE [DISTWIDTH] IN BLOOD BY AUTOMATED COUNT: 16 % (ref 11.8–14.4)
GFR, ESTIMATED: >90 ML/MIN/1.73M2
GLUCOSE BLD-MCNC: 123 MG/DL (ref 65–105)
GLUCOSE BLD-MCNC: 147 MG/DL (ref 65–105)
GLUCOSE BLD-MCNC: 154 MG/DL (ref 65–105)
GLUCOSE BLD-MCNC: 168 MG/DL (ref 65–105)
GLUCOSE SERPL-MCNC: 121 MG/DL (ref 70–99)
HCT VFR BLD AUTO: 26.6 % (ref 36.3–47.1)
HGB BLD-MCNC: 8.7 G/DL (ref 11.9–15.1)
IMM GRANULOCYTES # BLD AUTO: 0.08 K/UL (ref 0–0.3)
IMM GRANULOCYTES NFR BLD: 1 %
LYMPHOCYTES NFR BLD: 1.95 K/UL (ref 1.1–3.7)
LYMPHOCYTES RELATIVE PERCENT: 17 % (ref 24–43)
MAGNESIUM SERPL-MCNC: 2 MG/DL (ref 1.6–2.6)
MCH RBC QN AUTO: 30.5 PG (ref 25.2–33.5)
MCHC RBC AUTO-ENTMCNC: 32.7 G/DL (ref 28.4–34.8)
MCV RBC AUTO: 93.3 FL (ref 82.6–102.9)
MONOCYTES NFR BLD: 1.19 K/UL (ref 0.1–1.2)
MONOCYTES NFR BLD: 11 % (ref 3–12)
NEUTROPHILS NFR BLD: 69 % (ref 36–65)
NEUTS SEG NFR BLD: 7.72 K/UL (ref 1.5–8.1)
NRBC BLD-RTO: 0.2 PER 100 WBC
PHOSPHATE SERPL-MCNC: 2.5 MG/DL (ref 2.6–4.5)
PLATELET # BLD AUTO: 331 K/UL (ref 138–453)
PMV BLD AUTO: 9.2 FL (ref 8.1–13.5)
POTASSIUM SERPL-SCNC: 3.3 MMOL/L (ref 3.7–5.3)
PROT SERPL-MCNC: 5.8 G/DL (ref 6.4–8.3)
RBC # BLD AUTO: 2.85 M/UL (ref 3.95–5.11)
RBC # BLD: ABNORMAL 10*6/UL
SODIUM SERPL-SCNC: 138 MMOL/L (ref 135–144)
WBC OTHER # BLD: 11.2 K/UL (ref 3.5–11.3)

## 2024-09-15 PROCEDURE — 6360000002 HC RX W HCPCS: Performed by: STUDENT IN AN ORGANIZED HEALTH CARE EDUCATION/TRAINING PROGRAM

## 2024-09-15 PROCEDURE — 93970 EXTREMITY STUDY: CPT | Performed by: SURGERY

## 2024-09-15 PROCEDURE — 2580000003 HC RX 258: Performed by: RADIOLOGY

## 2024-09-15 PROCEDURE — 2580000003 HC RX 258

## 2024-09-15 PROCEDURE — 2580000003 HC RX 258: Performed by: STUDENT IN AN ORGANIZED HEALTH CARE EDUCATION/TRAINING PROGRAM

## 2024-09-15 PROCEDURE — 2500000003 HC RX 250 WO HCPCS: Performed by: STUDENT IN AN ORGANIZED HEALTH CARE EDUCATION/TRAINING PROGRAM

## 2024-09-15 PROCEDURE — 74018 RADEX ABDOMEN 1 VIEW: CPT

## 2024-09-15 PROCEDURE — 2500000003 HC RX 250 WO HCPCS: Performed by: SURGERY

## 2024-09-15 PROCEDURE — 2060000000 HC ICU INTERMEDIATE R&B

## 2024-09-15 PROCEDURE — 97166 OT EVAL MOD COMPLEX 45 MIN: CPT

## 2024-09-15 PROCEDURE — 99232 SBSQ HOSP IP/OBS MODERATE 35: CPT | Performed by: INTERNAL MEDICINE

## 2024-09-15 PROCEDURE — 83735 ASSAY OF MAGNESIUM: CPT

## 2024-09-15 PROCEDURE — 85025 COMPLETE CBC W/AUTO DIFF WBC: CPT

## 2024-09-15 PROCEDURE — 2580000003 HC RX 258: Performed by: SURGERY

## 2024-09-15 PROCEDURE — 82947 ASSAY GLUCOSE BLOOD QUANT: CPT

## 2024-09-15 PROCEDURE — 97530 THERAPEUTIC ACTIVITIES: CPT

## 2024-09-15 PROCEDURE — 80048 BASIC METABOLIC PNL TOTAL CA: CPT

## 2024-09-15 PROCEDURE — 80076 HEPATIC FUNCTION PANEL: CPT

## 2024-09-15 PROCEDURE — 6360000002 HC RX W HCPCS: Performed by: SURGERY

## 2024-09-15 PROCEDURE — 84100 ASSAY OF PHOSPHORUS: CPT

## 2024-09-15 RX ORDER — KETOROLAC TROMETHAMINE 30 MG/ML
30 INJECTION, SOLUTION INTRAMUSCULAR; INTRAVENOUS EVERY 6 HOURS PRN
Status: DISCONTINUED | OUTPATIENT
Start: 2024-09-15 | End: 2024-09-15 | Stop reason: SDUPTHER

## 2024-09-15 RX ADMIN — POTASSIUM PHOSPHATE, MONOBASIC POTASSIUM PHOSPHATE, DIBASIC: 224; 236 INJECTION, SOLUTION, CONCENTRATE INTRAVENOUS at 16:45

## 2024-09-15 RX ADMIN — KETOROLAC TROMETHAMINE 15 MG: 15 INJECTION, SOLUTION INTRAMUSCULAR; INTRAVENOUS at 19:32

## 2024-09-15 RX ADMIN — ENOXAPARIN SODIUM 40 MG: 100 INJECTION SUBCUTANEOUS at 08:10

## 2024-09-15 RX ADMIN — KETOROLAC TROMETHAMINE 15 MG: 15 INJECTION, SOLUTION INTRAMUSCULAR; INTRAVENOUS at 12:56

## 2024-09-15 RX ADMIN — DEXTROSE MONOHYDRATE: 100 INJECTION, SOLUTION INTRAVENOUS at 05:10

## 2024-09-15 RX ADMIN — METOPROLOL TARTRATE 5 MG: 5 INJECTION INTRAVENOUS at 16:39

## 2024-09-15 RX ADMIN — SODIUM CHLORIDE, PRESERVATIVE FREE 10 ML: 5 INJECTION INTRAVENOUS at 08:25

## 2024-09-15 RX ADMIN — SODIUM CHLORIDE, PRESERVATIVE FREE 10 ML: 5 INJECTION INTRAVENOUS at 19:36

## 2024-09-15 RX ADMIN — ONDANSETRON 4 MG: 2 INJECTION INTRAMUSCULAR; INTRAVENOUS at 08:10

## 2024-09-15 RX ADMIN — METOPROLOL TARTRATE 5 MG: 5 INJECTION INTRAVENOUS at 22:05

## 2024-09-15 RX ADMIN — HYDRALAZINE HYDROCHLORIDE 10 MG: 20 INJECTION INTRAMUSCULAR; INTRAVENOUS at 00:39

## 2024-09-15 RX ADMIN — KETOROLAC TROMETHAMINE 15 MG: 15 INJECTION, SOLUTION INTRAMUSCULAR; INTRAVENOUS at 00:38

## 2024-09-15 RX ADMIN — SODIUM CHLORIDE, PRESERVATIVE FREE 10 ML: 5 INJECTION INTRAVENOUS at 19:35

## 2024-09-15 RX ADMIN — METOPROLOL TARTRATE 5 MG: 5 INJECTION INTRAVENOUS at 05:18

## 2024-09-15 RX ADMIN — METOPROLOL TARTRATE 5 MG: 5 INJECTION INTRAVENOUS at 12:09

## 2024-09-15 RX ADMIN — METOPROLOL TARTRATE 5 MG: 5 INJECTION INTRAVENOUS at 00:39

## 2024-09-15 RX ADMIN — KETOROLAC TROMETHAMINE 15 MG: 15 INJECTION, SOLUTION INTRAMUSCULAR; INTRAVENOUS at 08:10

## 2024-09-15 ASSESSMENT — PAIN SCALES - GENERAL
PAINLEVEL_OUTOF10: 1
PAINLEVEL_OUTOF10: 2
PAINLEVEL_OUTOF10: 3
PAINLEVEL_OUTOF10: 1
PAINLEVEL_OUTOF10: 0
PAINLEVEL_OUTOF10: 2

## 2024-09-15 ASSESSMENT — PAIN DESCRIPTION - LOCATION
LOCATION: ABDOMEN
LOCATION: ABDOMEN
LOCATION: HEAD
LOCATION: ABDOMEN

## 2024-09-15 ASSESSMENT — PAIN DESCRIPTION - ORIENTATION
ORIENTATION: MID
ORIENTATION: MID
ORIENTATION: MID;ANTERIOR

## 2024-09-15 ASSESSMENT — PAIN - FUNCTIONAL ASSESSMENT
PAIN_FUNCTIONAL_ASSESSMENT: PREVENTS OR INTERFERES SOME ACTIVE ACTIVITIES AND ADLS
PAIN_FUNCTIONAL_ASSESSMENT: ACTIVITIES ARE NOT PREVENTED

## 2024-09-15 ASSESSMENT — PAIN DESCRIPTION - DESCRIPTORS
DESCRIPTORS: ACHING
DESCRIPTORS: ACHING
DESCRIPTORS: PRESSURE
DESCRIPTORS: CRAMPING

## 2024-09-16 PROBLEM — K56.7 POSTOPERATIVE ILEUS (HCC): Status: ACTIVE | Noted: 2024-09-16

## 2024-09-16 PROBLEM — K91.89 POSTOPERATIVE ILEUS (HCC): Status: ACTIVE | Noted: 2024-09-16

## 2024-09-16 LAB
ALBUMIN SERPL-MCNC: 3.4 G/DL (ref 3.5–5.2)
ALP SERPL-CCNC: 74 U/L (ref 35–104)
ALT SERPL-CCNC: 16 U/L (ref 5–33)
ANION GAP SERPL CALCULATED.3IONS-SCNC: 12 MMOL/L (ref 9–17)
AST SERPL-CCNC: 20 U/L
BASOPHILS # BLD: 0.03 K/UL (ref 0–0.2)
BASOPHILS NFR BLD: 0 % (ref 0–2)
BILIRUB DIRECT SERPL-MCNC: 0.1 MG/DL
BILIRUB INDIRECT SERPL-MCNC: 0.2 MG/DL (ref 0–1)
BILIRUB SERPL-MCNC: 0.3 MG/DL (ref 0.3–1.2)
BUN SERPL-MCNC: 9 MG/DL (ref 6–20)
BUN/CREAT SERPL: 13 (ref 9–20)
CALCIUM SERPL-MCNC: 8.9 MG/DL (ref 8.6–10.4)
CAMPYLOBACTER DNA SPEC NAA+PROBE: NORMAL
CHLORIDE SERPL-SCNC: 102 MMOL/L (ref 98–107)
CO2 SERPL-SCNC: 27 MMOL/L (ref 20–31)
CREAT SERPL-MCNC: 0.7 MG/DL (ref 0.5–0.9)
EOSINOPHIL # BLD: 0.2 K/UL (ref 0–0.44)
EOSINOPHILS RELATIVE PERCENT: 2 % (ref 1–4)
ERYTHROCYTE [DISTWIDTH] IN BLOOD BY AUTOMATED COUNT: 15.6 % (ref 11.8–14.4)
ETEC ELTA+ESTB GENES STL QL NAA+PROBE: NORMAL
GFR, ESTIMATED: >90 ML/MIN/1.73M2
GLUCOSE BLD-MCNC: 143 MG/DL (ref 65–105)
GLUCOSE BLD-MCNC: 171 MG/DL (ref 65–105)
GLUCOSE BLD-MCNC: 187 MG/DL (ref 65–105)
GLUCOSE SERPL-MCNC: 124 MG/DL (ref 70–99)
HCT VFR BLD AUTO: 27.1 % (ref 36.3–47.1)
HGB BLD-MCNC: 9.1 G/DL (ref 11.9–15.1)
IMM GRANULOCYTES # BLD AUTO: 0.11 K/UL (ref 0–0.3)
IMM GRANULOCYTES NFR BLD: 1 %
LYMPHOCYTES NFR BLD: 1.3 K/UL (ref 1.1–3.7)
LYMPHOCYTES RELATIVE PERCENT: 14 % (ref 24–43)
MAGNESIUM SERPL-MCNC: 1.9 MG/DL (ref 1.6–2.6)
MCH RBC QN AUTO: 31.1 PG (ref 25.2–33.5)
MCHC RBC AUTO-ENTMCNC: 33.6 G/DL (ref 28.4–34.8)
MCV RBC AUTO: 92.5 FL (ref 82.6–102.9)
MONOCYTES NFR BLD: 1 K/UL (ref 0.1–1.2)
MONOCYTES NFR BLD: 11 % (ref 3–12)
NEUTROPHILS NFR BLD: 72 % (ref 36–65)
NEUTS SEG NFR BLD: 6.51 K/UL (ref 1.5–8.1)
NRBC BLD-RTO: 0 PER 100 WBC
P SHIGELLOIDES DNA STL QL NAA+PROBE: NORMAL
PHOSPHATE SERPL-MCNC: 3.6 MG/DL (ref 2.6–4.5)
PLATELET # BLD AUTO: 357 K/UL (ref 138–453)
PMV BLD AUTO: 9.3 FL (ref 8.1–13.5)
POTASSIUM SERPL-SCNC: 3.8 MMOL/L (ref 3.7–5.3)
PROT SERPL-MCNC: 6.2 G/DL (ref 6.4–8.3)
RBC # BLD AUTO: 2.93 M/UL (ref 3.95–5.11)
RBC # BLD: ABNORMAL 10*6/UL
SALMONELLA DNA SPEC QL NAA+PROBE: NORMAL
SHIGA TOXIN STX GENE SPEC NAA+PROBE: NORMAL
SHIGELLA DNA SPEC QL NAA+PROBE: NORMAL
SODIUM SERPL-SCNC: 141 MMOL/L (ref 135–144)
SPECIMEN DESCRIPTION: NORMAL
V CHOL+PARA RFBL+TRKH+TNAA STL QL NAA+PR: NORMAL
WBC OTHER # BLD: 9.2 K/UL (ref 3.5–11.3)
Y ENTERO RECN STL QL NAA+PROBE: NORMAL

## 2024-09-16 PROCEDURE — 97116 GAIT TRAINING THERAPY: CPT

## 2024-09-16 PROCEDURE — 2580000003 HC RX 258: Performed by: STUDENT IN AN ORGANIZED HEALTH CARE EDUCATION/TRAINING PROGRAM

## 2024-09-16 PROCEDURE — 2500000003 HC RX 250 WO HCPCS: Performed by: INTERNAL MEDICINE

## 2024-09-16 PROCEDURE — 97535 SELF CARE MNGMENT TRAINING: CPT

## 2024-09-16 PROCEDURE — 2060000000 HC ICU INTERMEDIATE R&B

## 2024-09-16 PROCEDURE — 82947 ASSAY GLUCOSE BLOOD QUANT: CPT

## 2024-09-16 PROCEDURE — 6360000002 HC RX W HCPCS: Performed by: STUDENT IN AN ORGANIZED HEALTH CARE EDUCATION/TRAINING PROGRAM

## 2024-09-16 PROCEDURE — 80048 BASIC METABOLIC PNL TOTAL CA: CPT

## 2024-09-16 PROCEDURE — 84100 ASSAY OF PHOSPHORUS: CPT

## 2024-09-16 PROCEDURE — 97530 THERAPEUTIC ACTIVITIES: CPT

## 2024-09-16 PROCEDURE — 2580000003 HC RX 258: Performed by: INTERNAL MEDICINE

## 2024-09-16 PROCEDURE — 2580000003 HC RX 258

## 2024-09-16 PROCEDURE — 6360000002 HC RX W HCPCS

## 2024-09-16 PROCEDURE — 99232 SBSQ HOSP IP/OBS MODERATE 35: CPT | Performed by: INTERNAL MEDICINE

## 2024-09-16 PROCEDURE — 2500000003 HC RX 250 WO HCPCS

## 2024-09-16 PROCEDURE — 2500000003 HC RX 250 WO HCPCS: Performed by: STUDENT IN AN ORGANIZED HEALTH CARE EDUCATION/TRAINING PROGRAM

## 2024-09-16 PROCEDURE — 80076 HEPATIC FUNCTION PANEL: CPT

## 2024-09-16 PROCEDURE — 83735 ASSAY OF MAGNESIUM: CPT

## 2024-09-16 PROCEDURE — 85025 COMPLETE CBC W/AUTO DIFF WBC: CPT

## 2024-09-16 RX ORDER — METOPROLOL TARTRATE 1 MG/ML
5 INJECTION, SOLUTION INTRAVENOUS EVERY 4 HOURS
Status: DISCONTINUED | OUTPATIENT
Start: 2024-09-16 | End: 2024-09-17

## 2024-09-16 RX ADMIN — KETOROLAC TROMETHAMINE 15 MG: 15 INJECTION, SOLUTION INTRAMUSCULAR; INTRAVENOUS at 20:18

## 2024-09-16 RX ADMIN — METOPROLOL TARTRATE 5 MG: 5 INJECTION INTRAVENOUS at 20:18

## 2024-09-16 RX ADMIN — HYDRALAZINE HYDROCHLORIDE 10 MG: 20 INJECTION INTRAMUSCULAR; INTRAVENOUS at 12:03

## 2024-09-16 RX ADMIN — SODIUM CHLORIDE, PRESERVATIVE FREE 10 ML: 5 INJECTION INTRAVENOUS at 08:40

## 2024-09-16 RX ADMIN — HYDRALAZINE HYDROCHLORIDE 10 MG: 20 INJECTION INTRAMUSCULAR; INTRAVENOUS at 00:15

## 2024-09-16 RX ADMIN — HYDROMORPHONE HYDROCHLORIDE 0.5 MG: 1 INJECTION, SOLUTION INTRAMUSCULAR; INTRAVENOUS; SUBCUTANEOUS at 03:39

## 2024-09-16 RX ADMIN — SODIUM CHLORIDE, PRESERVATIVE FREE 10 ML: 5 INJECTION INTRAVENOUS at 22:14

## 2024-09-16 RX ADMIN — METOPROLOL TARTRATE 5 MG: 5 INJECTION INTRAVENOUS at 11:24

## 2024-09-16 RX ADMIN — METOPROLOL TARTRATE 5 MG: 5 INJECTION INTRAVENOUS at 05:02

## 2024-09-16 RX ADMIN — METOPROLOL TARTRATE 5 MG: 5 INJECTION INTRAVENOUS at 16:22

## 2024-09-16 RX ADMIN — KETOROLAC TROMETHAMINE 15 MG: 15 INJECTION, SOLUTION INTRAMUSCULAR; INTRAVENOUS at 00:15

## 2024-09-16 RX ADMIN — KETOROLAC TROMETHAMINE 15 MG: 15 INJECTION, SOLUTION INTRAMUSCULAR; INTRAVENOUS at 07:44

## 2024-09-16 RX ADMIN — POTASSIUM PHOSPHATE, MONOBASIC POTASSIUM PHOSPHATE, DIBASIC: 224; 236 INJECTION, SOLUTION, CONCENTRATE INTRAVENOUS at 18:01

## 2024-09-16 RX ADMIN — KETOROLAC TROMETHAMINE 15 MG: 15 INJECTION, SOLUTION INTRAMUSCULAR; INTRAVENOUS at 14:07

## 2024-09-16 RX ADMIN — ENOXAPARIN SODIUM 40 MG: 100 INJECTION SUBCUTANEOUS at 08:39

## 2024-09-16 RX ADMIN — SODIUM CHLORIDE: 9 INJECTION, SOLUTION INTRAVENOUS at 09:31

## 2024-09-16 ASSESSMENT — PAIN - FUNCTIONAL ASSESSMENT
PAIN_FUNCTIONAL_ASSESSMENT: ACTIVITIES ARE NOT PREVENTED
PAIN_FUNCTIONAL_ASSESSMENT: PREVENTS OR INTERFERES SOME ACTIVE ACTIVITIES AND ADLS

## 2024-09-16 ASSESSMENT — PAIN DESCRIPTION - LOCATION
LOCATION: HEAD
LOCATION: HEAD
LOCATION: KNEE
LOCATION: HEAD
LOCATION: HEAD
LOCATION: ABDOMEN

## 2024-09-16 ASSESSMENT — PAIN DESCRIPTION - ORIENTATION
ORIENTATION: ANTERIOR;MID
ORIENTATION: RIGHT
ORIENTATION: ANTERIOR
ORIENTATION: ANTERIOR

## 2024-09-16 ASSESSMENT — PAIN SCALES - GENERAL
PAINLEVEL_OUTOF10: 6
PAINLEVEL_OUTOF10: 0
PAINLEVEL_OUTOF10: 6
PAINLEVEL_OUTOF10: 0
PAINLEVEL_OUTOF10: 0
PAINLEVEL_OUTOF10: 6
PAINLEVEL_OUTOF10: 6
PAINLEVEL_OUTOF10: 0
PAINLEVEL_OUTOF10: 6

## 2024-09-16 ASSESSMENT — PAIN DESCRIPTION - DESCRIPTORS
DESCRIPTORS: SHARP;ACHING
DESCRIPTORS: ACHING
DESCRIPTORS: ACHING
DESCRIPTORS: ACHING;SHARP
DESCRIPTORS: ACHING

## 2024-09-17 LAB
ANION GAP SERPL CALCULATED.3IONS-SCNC: 11 MMOL/L (ref 9–17)
BASOPHILS # BLD: 0.03 K/UL (ref 0–0.2)
BASOPHILS NFR BLD: 0 % (ref 0–2)
BUN SERPL-MCNC: 11 MG/DL (ref 6–20)
BUN/CREAT SERPL: 18 (ref 9–20)
CALCIUM SERPL-MCNC: 8.6 MG/DL (ref 8.6–10.4)
CHLORIDE SERPL-SCNC: 104 MMOL/L (ref 98–107)
CO2 SERPL-SCNC: 25 MMOL/L (ref 20–31)
CREAT SERPL-MCNC: 0.6 MG/DL (ref 0.5–0.9)
EOSINOPHIL # BLD: 0.19 K/UL (ref 0–0.44)
EOSINOPHILS RELATIVE PERCENT: 2 % (ref 1–4)
ERYTHROCYTE [DISTWIDTH] IN BLOOD BY AUTOMATED COUNT: 15.9 % (ref 11.8–14.4)
GFR, ESTIMATED: >90 ML/MIN/1.73M2
GLUCOSE BLD-MCNC: 137 MG/DL (ref 65–105)
GLUCOSE BLD-MCNC: 159 MG/DL (ref 65–105)
GLUCOSE BLD-MCNC: 167 MG/DL (ref 65–105)
GLUCOSE BLD-MCNC: 179 MG/DL (ref 65–105)
GLUCOSE BLD-MCNC: 194 MG/DL (ref 65–105)
GLUCOSE SERPL-MCNC: 163 MG/DL (ref 70–99)
HCT VFR BLD AUTO: 26.8 % (ref 36.3–47.1)
HGB BLD-MCNC: 8.3 G/DL (ref 11.9–15.1)
IMM GRANULOCYTES # BLD AUTO: 0.11 K/UL (ref 0–0.3)
IMM GRANULOCYTES NFR BLD: 1 %
LYMPHOCYTES NFR BLD: 1.37 K/UL (ref 1.1–3.7)
LYMPHOCYTES RELATIVE PERCENT: 16 % (ref 24–43)
MAGNESIUM SERPL-MCNC: 1.9 MG/DL (ref 1.6–2.6)
MCH RBC QN AUTO: 31 PG (ref 25.2–33.5)
MCHC RBC AUTO-ENTMCNC: 31 G/DL (ref 28.4–34.8)
MCV RBC AUTO: 100 FL (ref 82.6–102.9)
MONOCYTES NFR BLD: 0.92 K/UL (ref 0.1–1.2)
MONOCYTES NFR BLD: 11 % (ref 3–12)
NEUTROPHILS NFR BLD: 70 % (ref 36–65)
NEUTS SEG NFR BLD: 6.07 K/UL (ref 1.5–8.1)
NRBC BLD-RTO: 0 PER 100 WBC
PHOSPHATE SERPL-MCNC: 3.1 MG/DL (ref 2.6–4.5)
PLATELET # BLD AUTO: 329 K/UL (ref 138–453)
PMV BLD AUTO: 9.8 FL (ref 8.1–13.5)
POTASSIUM SERPL-SCNC: 3.6 MMOL/L (ref 3.7–5.3)
RBC # BLD AUTO: 2.68 M/UL (ref 3.95–5.11)
RBC # BLD: ABNORMAL 10*6/UL
SODIUM SERPL-SCNC: 140 MMOL/L (ref 135–144)
WBC OTHER # BLD: 8.7 K/UL (ref 3.5–11.3)

## 2024-09-17 PROCEDURE — 85025 COMPLETE CBC W/AUTO DIFF WBC: CPT

## 2024-09-17 PROCEDURE — 2500000003 HC RX 250 WO HCPCS: Performed by: INTERNAL MEDICINE

## 2024-09-17 PROCEDURE — 6370000000 HC RX 637 (ALT 250 FOR IP): Performed by: INTERNAL MEDICINE

## 2024-09-17 PROCEDURE — 82947 ASSAY GLUCOSE BLOOD QUANT: CPT

## 2024-09-17 PROCEDURE — 84100 ASSAY OF PHOSPHORUS: CPT

## 2024-09-17 PROCEDURE — 97535 SELF CARE MNGMENT TRAINING: CPT

## 2024-09-17 PROCEDURE — 83735 ASSAY OF MAGNESIUM: CPT

## 2024-09-17 PROCEDURE — 6360000002 HC RX W HCPCS: Performed by: NURSE PRACTITIONER

## 2024-09-17 PROCEDURE — 2500000003 HC RX 250 WO HCPCS

## 2024-09-17 PROCEDURE — 2580000003 HC RX 258: Performed by: RADIOLOGY

## 2024-09-17 PROCEDURE — 6370000000 HC RX 637 (ALT 250 FOR IP): Performed by: NURSE PRACTITIONER

## 2024-09-17 PROCEDURE — 6360000002 HC RX W HCPCS

## 2024-09-17 PROCEDURE — 6360000002 HC RX W HCPCS: Performed by: STUDENT IN AN ORGANIZED HEALTH CARE EDUCATION/TRAINING PROGRAM

## 2024-09-17 PROCEDURE — 2580000003 HC RX 258: Performed by: INTERNAL MEDICINE

## 2024-09-17 PROCEDURE — 99232 SBSQ HOSP IP/OBS MODERATE 35: CPT | Performed by: INTERNAL MEDICINE

## 2024-09-17 PROCEDURE — 2580000003 HC RX 258

## 2024-09-17 PROCEDURE — 2060000000 HC ICU INTERMEDIATE R&B

## 2024-09-17 PROCEDURE — 80048 BASIC METABOLIC PNL TOTAL CA: CPT

## 2024-09-17 RX ORDER — DOCUSATE SODIUM 100 MG/1
100 CAPSULE, LIQUID FILLED ORAL 2 TIMES DAILY
Status: DISCONTINUED | OUTPATIENT
Start: 2024-09-17 | End: 2024-09-21 | Stop reason: HOSPADM

## 2024-09-17 RX ORDER — OXYCODONE HYDROCHLORIDE 5 MG/1
5 TABLET ORAL EVERY 6 HOURS PRN
Status: DISCONTINUED | OUTPATIENT
Start: 2024-09-17 | End: 2024-09-21 | Stop reason: HOSPADM

## 2024-09-17 RX ORDER — HYDRALAZINE HYDROCHLORIDE 20 MG/ML
10 INJECTION INTRAMUSCULAR; INTRAVENOUS ONCE
Status: COMPLETED | OUTPATIENT
Start: 2024-09-17 | End: 2024-09-17

## 2024-09-17 RX ORDER — ACETAMINOPHEN 325 MG/1
650 TABLET ORAL EVERY 4 HOURS PRN
Status: DISCONTINUED | OUTPATIENT
Start: 2024-09-17 | End: 2024-09-21 | Stop reason: HOSPADM

## 2024-09-17 RX ADMIN — METOPROLOL TARTRATE 5 MG: 5 INJECTION INTRAVENOUS at 12:59

## 2024-09-17 RX ADMIN — KETOROLAC TROMETHAMINE 15 MG: 15 INJECTION, SOLUTION INTRAMUSCULAR; INTRAVENOUS at 19:33

## 2024-09-17 RX ADMIN — KETOROLAC TROMETHAMINE 15 MG: 15 INJECTION, SOLUTION INTRAMUSCULAR; INTRAVENOUS at 03:30

## 2024-09-17 RX ADMIN — METOPROLOL TARTRATE 5 MG: 5 INJECTION INTRAVENOUS at 15:54

## 2024-09-17 RX ADMIN — HYDRALAZINE HYDROCHLORIDE 10 MG: 20 INJECTION INTRAMUSCULAR; INTRAVENOUS at 17:19

## 2024-09-17 RX ADMIN — SODIUM CHLORIDE: 9 INJECTION, SOLUTION INTRAVENOUS at 17:54

## 2024-09-17 RX ADMIN — HYDRALAZINE HYDROCHLORIDE 10 MG: 20 INJECTION INTRAMUSCULAR; INTRAVENOUS at 03:42

## 2024-09-17 RX ADMIN — METOPROLOL TARTRATE 5 MG: 5 INJECTION INTRAVENOUS at 00:17

## 2024-09-17 RX ADMIN — DOCUSATE SODIUM 100 MG: 100 CAPSULE, LIQUID FILLED ORAL at 19:55

## 2024-09-17 RX ADMIN — ENOXAPARIN SODIUM 40 MG: 100 INJECTION SUBCUTANEOUS at 08:57

## 2024-09-17 RX ADMIN — METOPROLOL TARTRATE 5 MG: 5 INJECTION INTRAVENOUS at 05:31

## 2024-09-17 RX ADMIN — SODIUM CHLORIDE: 9 INJECTION, SOLUTION INTRAVENOUS at 01:21

## 2024-09-17 RX ADMIN — SODIUM CHLORIDE, PRESERVATIVE FREE 10 ML: 5 INJECTION INTRAVENOUS at 19:33

## 2024-09-17 RX ADMIN — DOCUSATE SODIUM 100 MG: 100 CAPSULE, LIQUID FILLED ORAL at 12:59

## 2024-09-17 RX ADMIN — ACETAMINOPHEN 650 MG: 325 TABLET ORAL at 20:20

## 2024-09-17 RX ADMIN — METOPROLOL TARTRATE 75 MG: 25 TABLET, FILM COATED ORAL at 19:55

## 2024-09-17 RX ADMIN — SODIUM CHLORIDE, PRESERVATIVE FREE 10 ML: 5 INJECTION INTRAVENOUS at 08:58

## 2024-09-17 RX ADMIN — POTASSIUM PHOSPHATE, MONOBASIC POTASSIUM PHOSPHATE, DIBASIC: 224; 236 INJECTION, SOLUTION, CONCENTRATE INTRAVENOUS at 17:56

## 2024-09-17 RX ADMIN — HYDRALAZINE HYDROCHLORIDE 10 MG: 20 INJECTION INTRAMUSCULAR; INTRAVENOUS at 21:40

## 2024-09-17 RX ADMIN — KETOROLAC TROMETHAMINE 15 MG: 15 INJECTION, SOLUTION INTRAMUSCULAR; INTRAVENOUS at 08:57

## 2024-09-17 RX ADMIN — METOPROLOL TARTRATE 5 MG: 5 INJECTION INTRAVENOUS at 08:57

## 2024-09-17 RX ADMIN — KETOROLAC TROMETHAMINE 15 MG: 15 INJECTION, SOLUTION INTRAMUSCULAR; INTRAVENOUS at 12:59

## 2024-09-17 ASSESSMENT — PAIN SCALES - GENERAL
PAINLEVEL_OUTOF10: 0
PAINLEVEL_OUTOF10: 1
PAINLEVEL_OUTOF10: 0
PAINLEVEL_OUTOF10: 0
PAINLEVEL_OUTOF10: 1
PAINLEVEL_OUTOF10: 0
PAINLEVEL_OUTOF10: 0
PAINLEVEL_OUTOF10: 1

## 2024-09-17 ASSESSMENT — PAIN DESCRIPTION - LOCATION
LOCATION: ABDOMEN

## 2024-09-17 ASSESSMENT — PAIN DESCRIPTION - DESCRIPTORS
DESCRIPTORS: PRESSURE
DESCRIPTORS: ACHING
DESCRIPTORS: ACHING

## 2024-09-17 ASSESSMENT — PAIN DESCRIPTION - ORIENTATION
ORIENTATION: LOWER
ORIENTATION: MID

## 2024-09-17 ASSESSMENT — PAIN - FUNCTIONAL ASSESSMENT: PAIN_FUNCTIONAL_ASSESSMENT: PREVENTS OR INTERFERES SOME ACTIVE ACTIVITIES AND ADLS

## 2024-09-18 LAB
ALBUMIN SERPL-MCNC: 3.5 G/DL (ref 3.5–5.2)
ALP SERPL-CCNC: 82 U/L (ref 35–104)
ALT SERPL-CCNC: 14 U/L (ref 5–33)
ANION GAP SERPL CALCULATED.3IONS-SCNC: 12 MMOL/L (ref 9–17)
AST SERPL-CCNC: 15 U/L
BASOPHILS # BLD: 0.07 K/UL (ref 0–0.2)
BASOPHILS NFR BLD: 1 % (ref 0–2)
BILIRUB DIRECT SERPL-MCNC: 0.1 MG/DL
BILIRUB INDIRECT SERPL-MCNC: 0.2 MG/DL (ref 0–1)
BILIRUB SERPL-MCNC: 0.3 MG/DL (ref 0.3–1.2)
BUN SERPL-MCNC: 11 MG/DL (ref 6–20)
BUN/CREAT SERPL: 18 (ref 9–20)
CALCIUM SERPL-MCNC: 8.8 MG/DL (ref 8.6–10.4)
CHLORIDE SERPL-SCNC: 106 MMOL/L (ref 98–107)
CO2 SERPL-SCNC: 23 MMOL/L (ref 20–31)
CREAT SERPL-MCNC: 0.6 MG/DL (ref 0.5–0.9)
EOSINOPHIL # BLD: 0.28 K/UL (ref 0–0.44)
EOSINOPHILS RELATIVE PERCENT: 3 % (ref 1–4)
ERYTHROCYTE [DISTWIDTH] IN BLOOD BY AUTOMATED COUNT: 15.6 % (ref 11.8–14.4)
GFR, ESTIMATED: >90 ML/MIN/1.73M2
GLUCOSE BLD-MCNC: 147 MG/DL (ref 65–105)
GLUCOSE BLD-MCNC: 192 MG/DL (ref 65–105)
GLUCOSE SERPL-MCNC: 160 MG/DL (ref 70–99)
HCT VFR BLD AUTO: 28.4 % (ref 36.3–47.1)
HGB BLD-MCNC: 9.2 G/DL (ref 11.9–15.1)
IMM GRANULOCYTES # BLD AUTO: 0.18 K/UL (ref 0–0.3)
IMM GRANULOCYTES NFR BLD: 2 %
LYMPHOCYTES NFR BLD: 1.6 K/UL (ref 1.1–3.7)
LYMPHOCYTES RELATIVE PERCENT: 15 % (ref 24–43)
MAGNESIUM SERPL-MCNC: 1.8 MG/DL (ref 1.6–2.6)
MCH RBC QN AUTO: 30.7 PG (ref 25.2–33.5)
MCHC RBC AUTO-ENTMCNC: 32.4 G/DL (ref 28.4–34.8)
MCV RBC AUTO: 94.7 FL (ref 82.6–102.9)
MONOCYTES NFR BLD: 1.25 K/UL (ref 0.1–1.2)
MONOCYTES NFR BLD: 12 % (ref 3–12)
NEUTROPHILS NFR BLD: 69 % (ref 36–65)
NEUTS SEG NFR BLD: 7.46 K/UL (ref 1.5–8.1)
NRBC BLD-RTO: 0.2 PER 100 WBC
PHOSPHATE SERPL-MCNC: 3 MG/DL (ref 2.6–4.5)
PLATELET # BLD AUTO: 399 K/UL (ref 138–453)
PMV BLD AUTO: 9.3 FL (ref 8.1–13.5)
POTASSIUM SERPL-SCNC: 4.1 MMOL/L (ref 3.7–5.3)
PROT SERPL-MCNC: 6.1 G/DL (ref 6.4–8.3)
RBC # BLD AUTO: 3 M/UL (ref 3.95–5.11)
RBC # BLD: ABNORMAL 10*6/UL
SODIUM SERPL-SCNC: 141 MMOL/L (ref 135–144)
WBC OTHER # BLD: 10.8 K/UL (ref 3.5–11.3)

## 2024-09-18 PROCEDURE — 84100 ASSAY OF PHOSPHORUS: CPT

## 2024-09-18 PROCEDURE — 99231 SBSQ HOSP IP/OBS SF/LOW 25: CPT | Performed by: INTERNAL MEDICINE

## 2024-09-18 PROCEDURE — 6360000002 HC RX W HCPCS

## 2024-09-18 PROCEDURE — 6360000002 HC RX W HCPCS: Performed by: STUDENT IN AN ORGANIZED HEALTH CARE EDUCATION/TRAINING PROGRAM

## 2024-09-18 PROCEDURE — 6370000000 HC RX 637 (ALT 250 FOR IP): Performed by: INTERNAL MEDICINE

## 2024-09-18 PROCEDURE — 80048 BASIC METABOLIC PNL TOTAL CA: CPT

## 2024-09-18 PROCEDURE — 80076 HEPATIC FUNCTION PANEL: CPT

## 2024-09-18 PROCEDURE — 85025 COMPLETE CBC W/AUTO DIFF WBC: CPT

## 2024-09-18 PROCEDURE — 2060000000 HC ICU INTERMEDIATE R&B

## 2024-09-18 PROCEDURE — 82947 ASSAY GLUCOSE BLOOD QUANT: CPT

## 2024-09-18 PROCEDURE — 6370000000 HC RX 637 (ALT 250 FOR IP): Performed by: NURSE PRACTITIONER

## 2024-09-18 PROCEDURE — 97110 THERAPEUTIC EXERCISES: CPT

## 2024-09-18 PROCEDURE — 2580000003 HC RX 258: Performed by: INTERNAL MEDICINE

## 2024-09-18 PROCEDURE — 83735 ASSAY OF MAGNESIUM: CPT

## 2024-09-18 PROCEDURE — 2500000003 HC RX 250 WO HCPCS

## 2024-09-18 PROCEDURE — 2580000003 HC RX 258

## 2024-09-18 RX ORDER — BISACODYL 10 MG
10 SUPPOSITORY, RECTAL RECTAL DAILY PRN
Status: DISCONTINUED | OUTPATIENT
Start: 2024-09-18 | End: 2024-09-21 | Stop reason: HOSPADM

## 2024-09-18 RX ADMIN — METOPROLOL TARTRATE 75 MG: 25 TABLET, FILM COATED ORAL at 21:48

## 2024-09-18 RX ADMIN — POTASSIUM PHOSPHATE, MONOBASIC POTASSIUM PHOSPHATE, DIBASIC: 224; 236 INJECTION, SOLUTION, CONCENTRATE INTRAVENOUS at 17:24

## 2024-09-18 RX ADMIN — METOPROLOL TARTRATE 75 MG: 25 TABLET, FILM COATED ORAL at 09:44

## 2024-09-18 RX ADMIN — LEVOTHYROXINE SODIUM 100 MCG: 100 TABLET ORAL at 09:44

## 2024-09-18 RX ADMIN — NIFEDIPINE 60 MG: 30 TABLET, FILM COATED, EXTENDED RELEASE ORAL at 09:44

## 2024-09-18 RX ADMIN — ACETAMINOPHEN 650 MG: 325 TABLET ORAL at 17:42

## 2024-09-18 RX ADMIN — ATORVASTATIN CALCIUM 10 MG: 10 TABLET, FILM COATED ORAL at 09:44

## 2024-09-18 RX ADMIN — ENOXAPARIN SODIUM 40 MG: 100 INJECTION SUBCUTANEOUS at 09:45

## 2024-09-18 RX ADMIN — HYDROCHLOROTHIAZIDE 12.5 MG: 12.5 TABLET ORAL at 09:44

## 2024-09-18 RX ADMIN — HYDRALAZINE HYDROCHLORIDE 10 MG: 20 INJECTION INTRAMUSCULAR; INTRAVENOUS at 05:38

## 2024-09-18 RX ADMIN — ACETAMINOPHEN 650 MG: 325 TABLET ORAL at 09:44

## 2024-09-18 RX ADMIN — SODIUM CHLORIDE: 9 INJECTION, SOLUTION INTRAVENOUS at 13:53

## 2024-09-18 RX ADMIN — DOCUSATE SODIUM 100 MG: 100 CAPSULE, LIQUID FILLED ORAL at 09:44

## 2024-09-18 RX ADMIN — DOCUSATE SODIUM 100 MG: 100 CAPSULE, LIQUID FILLED ORAL at 21:48

## 2024-09-18 ASSESSMENT — PAIN SCALES - GENERAL
PAINLEVEL_OUTOF10: 0
PAINLEVEL_OUTOF10: 6
PAINLEVEL_OUTOF10: 0
PAINLEVEL_OUTOF10: 0
PAINLEVEL_OUTOF10: 6

## 2024-09-18 ASSESSMENT — PAIN DESCRIPTION - ORIENTATION
ORIENTATION: MID
ORIENTATION: MID

## 2024-09-18 ASSESSMENT — PAIN DESCRIPTION - LOCATION
LOCATION: HEAD
LOCATION: HEAD

## 2024-09-18 ASSESSMENT — PAIN DESCRIPTION - DESCRIPTORS
DESCRIPTORS: ACHING
DESCRIPTORS: ACHING

## 2024-09-18 ASSESSMENT — PAIN - FUNCTIONAL ASSESSMENT
PAIN_FUNCTIONAL_ASSESSMENT: ACTIVITIES ARE NOT PREVENTED
PAIN_FUNCTIONAL_ASSESSMENT: ACTIVITIES ARE NOT PREVENTED

## 2024-09-19 LAB
ANION GAP SERPL CALCULATED.3IONS-SCNC: 13 MMOL/L (ref 9–17)
BASOPHILS # BLD: 0.07 K/UL (ref 0–0.2)
BASOPHILS NFR BLD: 1 % (ref 0–2)
BUN SERPL-MCNC: 13 MG/DL (ref 6–20)
BUN/CREAT SERPL: 19 (ref 9–20)
CALCIUM SERPL-MCNC: 9.5 MG/DL (ref 8.6–10.4)
CHLORIDE SERPL-SCNC: 103 MMOL/L (ref 98–107)
CO2 SERPL-SCNC: 23 MMOL/L (ref 20–31)
CREAT SERPL-MCNC: 0.7 MG/DL (ref 0.5–0.9)
EOSINOPHIL # BLD: 0.27 K/UL (ref 0–0.44)
EOSINOPHILS RELATIVE PERCENT: 2 % (ref 1–4)
ERYTHROCYTE [DISTWIDTH] IN BLOOD BY AUTOMATED COUNT: 15.4 % (ref 11.8–14.4)
GFR, ESTIMATED: >90 ML/MIN/1.73M2
GLUCOSE BLD-MCNC: 140 MG/DL (ref 65–105)
GLUCOSE BLD-MCNC: 155 MG/DL (ref 65–105)
GLUCOSE SERPL-MCNC: 158 MG/DL (ref 70–99)
HCT VFR BLD AUTO: 30.3 % (ref 36.3–47.1)
HGB BLD-MCNC: 9.9 G/DL (ref 11.9–15.1)
IMM GRANULOCYTES # BLD AUTO: 0.37 K/UL (ref 0–0.3)
IMM GRANULOCYTES NFR BLD: 3 %
LYMPHOCYTES NFR BLD: 2.48 K/UL (ref 1.1–3.7)
LYMPHOCYTES RELATIVE PERCENT: 19 % (ref 24–43)
MAGNESIUM SERPL-MCNC: 1.9 MG/DL (ref 1.6–2.6)
MCH RBC QN AUTO: 30.1 PG (ref 25.2–33.5)
MCHC RBC AUTO-ENTMCNC: 32.7 G/DL (ref 28.4–34.8)
MCV RBC AUTO: 92.1 FL (ref 82.6–102.9)
MONOCYTES NFR BLD: 1.41 K/UL (ref 0.1–1.2)
MONOCYTES NFR BLD: 11 % (ref 3–12)
NEUTROPHILS NFR BLD: 64 % (ref 36–65)
NEUTS SEG NFR BLD: 8.44 K/UL (ref 1.5–8.1)
NRBC BLD-RTO: 0.2 PER 100 WBC
PHOSPHATE SERPL-MCNC: 4 MG/DL (ref 2.6–4.5)
PLATELET # BLD AUTO: 381 K/UL (ref 138–453)
PMV BLD AUTO: 9.3 FL (ref 8.1–13.5)
POTASSIUM SERPL-SCNC: 4.1 MMOL/L (ref 3.7–5.3)
RBC # BLD AUTO: 3.29 M/UL (ref 3.95–5.11)
RBC # BLD: ABNORMAL 10*6/UL
SODIUM SERPL-SCNC: 139 MMOL/L (ref 135–144)
WBC OTHER # BLD: 13 K/UL (ref 3.5–11.3)

## 2024-09-19 PROCEDURE — 80048 BASIC METABOLIC PNL TOTAL CA: CPT

## 2024-09-19 PROCEDURE — 83735 ASSAY OF MAGNESIUM: CPT

## 2024-09-19 PROCEDURE — 2060000000 HC ICU INTERMEDIATE R&B

## 2024-09-19 PROCEDURE — 84100 ASSAY OF PHOSPHORUS: CPT

## 2024-09-19 PROCEDURE — 6370000000 HC RX 637 (ALT 250 FOR IP): Performed by: INTERNAL MEDICINE

## 2024-09-19 PROCEDURE — 2580000003 HC RX 258

## 2024-09-19 PROCEDURE — 2500000003 HC RX 250 WO HCPCS

## 2024-09-19 PROCEDURE — 82947 ASSAY GLUCOSE BLOOD QUANT: CPT

## 2024-09-19 PROCEDURE — 6370000000 HC RX 637 (ALT 250 FOR IP)

## 2024-09-19 PROCEDURE — 97116 GAIT TRAINING THERAPY: CPT

## 2024-09-19 PROCEDURE — 6360000002 HC RX W HCPCS: Performed by: STUDENT IN AN ORGANIZED HEALTH CARE EDUCATION/TRAINING PROGRAM

## 2024-09-19 PROCEDURE — 85025 COMPLETE CBC W/AUTO DIFF WBC: CPT

## 2024-09-19 PROCEDURE — 99231 SBSQ HOSP IP/OBS SF/LOW 25: CPT | Performed by: INTERNAL MEDICINE

## 2024-09-19 PROCEDURE — 2580000003 HC RX 258: Performed by: RADIOLOGY

## 2024-09-19 PROCEDURE — 97110 THERAPEUTIC EXERCISES: CPT

## 2024-09-19 PROCEDURE — 6360000002 HC RX W HCPCS

## 2024-09-19 PROCEDURE — 2580000003 HC RX 258: Performed by: INTERNAL MEDICINE

## 2024-09-19 RX ADMIN — NIFEDIPINE 60 MG: 30 TABLET, FILM COATED, EXTENDED RELEASE ORAL at 09:10

## 2024-09-19 RX ADMIN — DOCUSATE SODIUM 100 MG: 100 CAPSULE, LIQUID FILLED ORAL at 21:29

## 2024-09-19 RX ADMIN — METOPROLOL TARTRATE 75 MG: 25 TABLET, FILM COATED ORAL at 09:10

## 2024-09-19 RX ADMIN — ATORVASTATIN CALCIUM 10 MG: 10 TABLET, FILM COATED ORAL at 09:10

## 2024-09-19 RX ADMIN — SODIUM CHLORIDE: 9 INJECTION, SOLUTION INTRAVENOUS at 07:24

## 2024-09-19 RX ADMIN — SODIUM CHLORIDE, PRESERVATIVE FREE 10 ML: 5 INJECTION INTRAVENOUS at 09:10

## 2024-09-19 RX ADMIN — LEVOTHYROXINE SODIUM 100 MCG: 100 TABLET ORAL at 09:10

## 2024-09-19 RX ADMIN — ENOXAPARIN SODIUM 40 MG: 100 INJECTION SUBCUTANEOUS at 09:10

## 2024-09-19 RX ADMIN — DOCUSATE SODIUM 100 MG: 100 CAPSULE, LIQUID FILLED ORAL at 09:10

## 2024-09-19 RX ADMIN — BISACODYL 10 MG: 10 SUPPOSITORY RECTAL at 05:03

## 2024-09-19 RX ADMIN — METOPROLOL TARTRATE 75 MG: 25 TABLET, FILM COATED ORAL at 21:29

## 2024-09-19 RX ADMIN — POTASSIUM PHOSPHATE, MONOBASIC POTASSIUM PHOSPHATE, DIBASIC: 224; 236 INJECTION, SOLUTION, CONCENTRATE INTRAVENOUS at 19:26

## 2024-09-19 RX ADMIN — HYDROCHLOROTHIAZIDE 12.5 MG: 12.5 TABLET ORAL at 09:10

## 2024-09-19 ASSESSMENT — PAIN SCALES - GENERAL
PAINLEVEL_OUTOF10: 0

## 2024-09-20 LAB
ALBUMIN SERPL-MCNC: 3.5 G/DL (ref 3.5–5.2)
ALP SERPL-CCNC: 97 U/L (ref 35–104)
ALT SERPL-CCNC: 17 U/L (ref 5–33)
ANION GAP SERPL CALCULATED.3IONS-SCNC: 11 MMOL/L (ref 9–17)
AST SERPL-CCNC: 16 U/L
BASOPHILS # BLD: 0.06 K/UL (ref 0–0.2)
BASOPHILS NFR BLD: 1 % (ref 0–2)
BILIRUB DIRECT SERPL-MCNC: 0.1 MG/DL
BILIRUB INDIRECT SERPL-MCNC: 0.2 MG/DL (ref 0–1)
BILIRUB SERPL-MCNC: 0.3 MG/DL (ref 0.3–1.2)
BUN SERPL-MCNC: 13 MG/DL (ref 6–20)
BUN/CREAT SERPL: 19 (ref 9–20)
CALCIUM SERPL-MCNC: 8.9 MG/DL (ref 8.6–10.4)
CHLORIDE SERPL-SCNC: 104 MMOL/L (ref 98–107)
CO2 SERPL-SCNC: 24 MMOL/L (ref 20–31)
CREAT SERPL-MCNC: 0.7 MG/DL (ref 0.5–0.9)
EOSINOPHIL # BLD: 0.25 K/UL (ref 0–0.44)
EOSINOPHILS RELATIVE PERCENT: 2 % (ref 1–4)
ERYTHROCYTE [DISTWIDTH] IN BLOOD BY AUTOMATED COUNT: 15.2 % (ref 11.8–14.4)
GFR, ESTIMATED: >90 ML/MIN/1.73M2
GLUCOSE BLD-MCNC: 129 MG/DL (ref 65–105)
GLUCOSE BLD-MCNC: 151 MG/DL (ref 65–105)
GLUCOSE BLD-MCNC: 152 MG/DL (ref 65–105)
GLUCOSE BLD-MCNC: 166 MG/DL (ref 65–105)
GLUCOSE SERPL-MCNC: 160 MG/DL (ref 70–99)
HCT VFR BLD AUTO: 27.8 % (ref 36.3–47.1)
HGB BLD-MCNC: 9.1 G/DL (ref 11.9–15.1)
IMM GRANULOCYTES # BLD AUTO: 0.25 K/UL (ref 0–0.3)
IMM GRANULOCYTES NFR BLD: 2 %
LYMPHOCYTES NFR BLD: 1.96 K/UL (ref 1.1–3.7)
LYMPHOCYTES RELATIVE PERCENT: 17 % (ref 24–43)
MAGNESIUM SERPL-MCNC: 1.8 MG/DL (ref 1.6–2.6)
MCH RBC QN AUTO: 30.3 PG (ref 25.2–33.5)
MCHC RBC AUTO-ENTMCNC: 32.7 G/DL (ref 28.4–34.8)
MCV RBC AUTO: 92.7 FL (ref 82.6–102.9)
MONOCYTES NFR BLD: 1.41 K/UL (ref 0.1–1.2)
MONOCYTES NFR BLD: 12 % (ref 3–12)
NEUTROPHILS NFR BLD: 66 % (ref 36–65)
NEUTS SEG NFR BLD: 7.6 K/UL (ref 1.5–8.1)
NRBC BLD-RTO: 0.2 PER 100 WBC
PLATELET # BLD AUTO: 344 K/UL (ref 138–453)
PMV BLD AUTO: 9.5 FL (ref 8.1–13.5)
POTASSIUM SERPL-SCNC: 4.1 MMOL/L (ref 3.7–5.3)
PROT SERPL-MCNC: 6.3 G/DL (ref 6.4–8.3)
RBC # BLD AUTO: 3 M/UL (ref 3.95–5.11)
RBC # BLD: ABNORMAL 10*6/UL
SODIUM SERPL-SCNC: 139 MMOL/L (ref 135–144)
WBC OTHER # BLD: 11.5 K/UL (ref 3.5–11.3)

## 2024-09-20 PROCEDURE — 99232 SBSQ HOSP IP/OBS MODERATE 35: CPT | Performed by: INTERNAL MEDICINE

## 2024-09-20 PROCEDURE — 82947 ASSAY GLUCOSE BLOOD QUANT: CPT

## 2024-09-20 PROCEDURE — 2580000003 HC RX 258: Performed by: RADIOLOGY

## 2024-09-20 PROCEDURE — 2580000003 HC RX 258: Performed by: INTERNAL MEDICINE

## 2024-09-20 PROCEDURE — 83735 ASSAY OF MAGNESIUM: CPT

## 2024-09-20 PROCEDURE — 80076 HEPATIC FUNCTION PANEL: CPT

## 2024-09-20 PROCEDURE — 85025 COMPLETE CBC W/AUTO DIFF WBC: CPT

## 2024-09-20 PROCEDURE — 6360000002 HC RX W HCPCS: Performed by: STUDENT IN AN ORGANIZED HEALTH CARE EDUCATION/TRAINING PROGRAM

## 2024-09-20 PROCEDURE — 80048 BASIC METABOLIC PNL TOTAL CA: CPT

## 2024-09-20 PROCEDURE — 6370000000 HC RX 637 (ALT 250 FOR IP): Performed by: INTERNAL MEDICINE

## 2024-09-20 PROCEDURE — 1200000000 HC SEMI PRIVATE

## 2024-09-20 RX ADMIN — METOPROLOL TARTRATE 75 MG: 25 TABLET, FILM COATED ORAL at 21:58

## 2024-09-20 RX ADMIN — ENOXAPARIN SODIUM 40 MG: 100 INJECTION SUBCUTANEOUS at 09:17

## 2024-09-20 RX ADMIN — SODIUM CHLORIDE: 9 INJECTION, SOLUTION INTRAVENOUS at 01:29

## 2024-09-20 RX ADMIN — SODIUM CHLORIDE, PRESERVATIVE FREE 10 ML: 5 INJECTION INTRAVENOUS at 09:17

## 2024-09-20 RX ADMIN — HYDROCHLOROTHIAZIDE 12.5 MG: 12.5 TABLET ORAL at 09:17

## 2024-09-20 RX ADMIN — METOPROLOL TARTRATE 75 MG: 25 TABLET, FILM COATED ORAL at 09:17

## 2024-09-20 RX ADMIN — NIFEDIPINE 60 MG: 30 TABLET, FILM COATED, EXTENDED RELEASE ORAL at 09:17

## 2024-09-20 RX ADMIN — LEVOTHYROXINE SODIUM 100 MCG: 100 TABLET ORAL at 09:17

## 2024-09-20 RX ADMIN — ATORVASTATIN CALCIUM 10 MG: 10 TABLET, FILM COATED ORAL at 09:17

## 2024-09-20 ASSESSMENT — PAIN SCALES - GENERAL: PAINLEVEL_OUTOF10: 0

## 2024-09-21 VITALS
TEMPERATURE: 98.5 F | HEIGHT: 62 IN | OXYGEN SATURATION: 98 % | HEART RATE: 75 BPM | WEIGHT: 203.6 LBS | DIASTOLIC BLOOD PRESSURE: 84 MMHG | SYSTOLIC BLOOD PRESSURE: 135 MMHG | BODY MASS INDEX: 37.47 KG/M2 | RESPIRATION RATE: 17 BRPM

## 2024-09-21 LAB
ANION GAP SERPL CALCULATED.3IONS-SCNC: 14 MMOL/L (ref 9–17)
BASOPHILS # BLD: 0.07 K/UL (ref 0–0.2)
BASOPHILS NFR BLD: 1 % (ref 0–2)
BUN SERPL-MCNC: 14 MG/DL (ref 6–20)
BUN/CREAT SERPL: 18 (ref 9–20)
CALCIUM SERPL-MCNC: 9.3 MG/DL (ref 8.6–10.4)
CHLORIDE SERPL-SCNC: 101 MMOL/L (ref 98–107)
CO2 SERPL-SCNC: 23 MMOL/L (ref 20–31)
CREAT SERPL-MCNC: 0.8 MG/DL (ref 0.5–0.9)
EOSINOPHIL # BLD: 0.18 K/UL (ref 0–0.44)
EOSINOPHILS RELATIVE PERCENT: 2 % (ref 1–4)
ERYTHROCYTE [DISTWIDTH] IN BLOOD BY AUTOMATED COUNT: 14.9 % (ref 11.8–14.4)
GFR, ESTIMATED: 86 ML/MIN/1.73M2
GLUCOSE BLD-MCNC: 124 MG/DL (ref 65–105)
GLUCOSE BLD-MCNC: 131 MG/DL (ref 65–105)
GLUCOSE BLD-MCNC: 146 MG/DL (ref 65–105)
GLUCOSE BLD-MCNC: 151 MG/DL (ref 65–105)
GLUCOSE SERPL-MCNC: 133 MG/DL (ref 70–99)
HCT VFR BLD AUTO: 30.5 % (ref 36.3–47.1)
HGB BLD-MCNC: 10.2 G/DL (ref 11.9–15.1)
IMM GRANULOCYTES # BLD AUTO: 0.21 K/UL (ref 0–0.3)
IMM GRANULOCYTES NFR BLD: 2 %
LYMPHOCYTES NFR BLD: 1.98 K/UL (ref 1.1–3.7)
LYMPHOCYTES RELATIVE PERCENT: 16 % (ref 24–43)
MAGNESIUM SERPL-MCNC: 1.8 MG/DL (ref 1.6–2.6)
MCH RBC QN AUTO: 30.7 PG (ref 25.2–33.5)
MCHC RBC AUTO-ENTMCNC: 33.4 G/DL (ref 28.4–34.8)
MCV RBC AUTO: 91.9 FL (ref 82.6–102.9)
MONOCYTES NFR BLD: 1.29 K/UL (ref 0.1–1.2)
MONOCYTES NFR BLD: 11 % (ref 3–12)
NEUTROPHILS NFR BLD: 68 % (ref 36–65)
NEUTS SEG NFR BLD: 8.57 K/UL (ref 1.5–8.1)
NRBC BLD-RTO: 0 PER 100 WBC
PLATELET # BLD AUTO: 442 K/UL (ref 138–453)
PMV BLD AUTO: 9.8 FL (ref 8.1–13.5)
POTASSIUM SERPL-SCNC: 4.9 MMOL/L (ref 3.7–5.3)
RBC # BLD AUTO: 3.32 M/UL (ref 3.95–5.11)
RBC # BLD: ABNORMAL 10*6/UL
SODIUM SERPL-SCNC: 138 MMOL/L (ref 135–144)
WBC OTHER # BLD: 12.3 K/UL (ref 3.5–11.3)

## 2024-09-21 PROCEDURE — 80048 BASIC METABOLIC PNL TOTAL CA: CPT

## 2024-09-21 PROCEDURE — 2580000003 HC RX 258: Performed by: RADIOLOGY

## 2024-09-21 PROCEDURE — 99239 HOSP IP/OBS DSCHRG MGMT >30: CPT | Performed by: INTERNAL MEDICINE

## 2024-09-21 PROCEDURE — 85025 COMPLETE CBC W/AUTO DIFF WBC: CPT

## 2024-09-21 PROCEDURE — 83735 ASSAY OF MAGNESIUM: CPT

## 2024-09-21 PROCEDURE — 97116 GAIT TRAINING THERAPY: CPT

## 2024-09-21 PROCEDURE — 82947 ASSAY GLUCOSE BLOOD QUANT: CPT

## 2024-09-21 PROCEDURE — 6370000000 HC RX 637 (ALT 250 FOR IP): Performed by: INTERNAL MEDICINE

## 2024-09-21 RX ORDER — ONDANSETRON 4 MG/1
4 TABLET, ORALLY DISINTEGRATING ORAL 3 TIMES DAILY PRN
Qty: 21 TABLET | Refills: 0 | Status: SHIPPED | OUTPATIENT
Start: 2024-09-21

## 2024-09-21 RX ORDER — DOCUSATE SODIUM 100 MG/1
100 CAPSULE, LIQUID FILLED ORAL 2 TIMES DAILY
Qty: 20 CAPSULE | Refills: 0 | Status: SHIPPED | OUTPATIENT
Start: 2024-09-21 | End: 2024-10-01

## 2024-09-21 RX ORDER — HYDROCODONE BITARTRATE AND ACETAMINOPHEN 5; 325 MG/1; MG/1
1 TABLET ORAL EVERY 6 HOURS PRN
Qty: 20 TABLET | Refills: 0 | Status: SHIPPED | OUTPATIENT
Start: 2024-09-21 | End: 2024-09-26

## 2024-09-21 RX ADMIN — SODIUM CHLORIDE, PRESERVATIVE FREE 10 ML: 5 INJECTION INTRAVENOUS at 08:24

## 2024-09-21 RX ADMIN — NIFEDIPINE 60 MG: 30 TABLET, FILM COATED, EXTENDED RELEASE ORAL at 08:19

## 2024-09-21 RX ADMIN — LEVOTHYROXINE SODIUM 100 MCG: 100 TABLET ORAL at 08:19

## 2024-09-21 RX ADMIN — HYDROCHLOROTHIAZIDE 12.5 MG: 12.5 TABLET ORAL at 08:19

## 2024-09-21 RX ADMIN — ATORVASTATIN CALCIUM 10 MG: 10 TABLET, FILM COATED ORAL at 08:19

## 2024-09-21 RX ADMIN — METOPROLOL TARTRATE 75 MG: 25 TABLET, FILM COATED ORAL at 08:19

## 2024-09-23 ENCOUNTER — CARE COORDINATION (OUTPATIENT)
Dept: CARE COORDINATION | Age: 54
End: 2024-09-23

## 2024-09-23 ENCOUNTER — ENROLLMENT (OUTPATIENT)
Dept: CARE COORDINATION | Age: 54
End: 2024-09-23

## 2024-09-24 ENCOUNTER — CARE COORDINATION (OUTPATIENT)
Dept: CARE COORDINATION | Age: 54
End: 2024-09-24

## 2024-10-01 DIAGNOSIS — M62.830 BACK SPASM: ICD-10-CM

## 2024-10-01 RX ORDER — CYCLOBENZAPRINE HCL 10 MG
TABLET ORAL
Qty: 30 TABLET | Refills: 1 | Status: SHIPPED | OUTPATIENT
Start: 2024-10-01

## 2024-10-30 ENCOUNTER — OFFICE VISIT (OUTPATIENT)
Dept: ORTHOPEDIC SURGERY | Age: 54
End: 2024-10-30
Payer: COMMERCIAL

## 2024-10-30 VITALS — BODY MASS INDEX: 33.86 KG/M2 | RESPIRATION RATE: 17 BRPM | HEIGHT: 62 IN | OXYGEN SATURATION: 98 % | WEIGHT: 184 LBS

## 2024-10-30 DIAGNOSIS — M17.11 PRIMARY OSTEOARTHRITIS OF RIGHT KNEE: Primary | ICD-10-CM

## 2024-10-30 PROCEDURE — 99213 OFFICE O/P EST LOW 20 MIN: CPT | Performed by: ORTHOPAEDIC SURGERY

## 2024-10-30 NOTE — PATIENT INSTRUCTIONS
PATIENTIQ:  PatientIQ helps [facility] stay in touch with you to know how you're feeling, and provides education and care instructions to you at various time points.   Your answers help your care team track your progress to provide the best care possible. PatientIQ will contact you pre-op and post-op via email or text with:  Educational Videos and Care Instructions  Questionnaires About How You're Feeling    Your participation provides you valuable education and helps [facility] continue to provide quality care to all patients. Thank you

## 2024-10-31 ASSESSMENT — ENCOUNTER SYMPTOMS
NAUSEA: 0
COLOR CHANGE: 0
RESPIRATORY NEGATIVE: 1
ABDOMINAL PAIN: 0
SHORTNESS OF BREATH: 0
DIARRHEA: 0
CONSTIPATION: 0
COUGH: 0
APNEA: 0
CHEST TIGHTNESS: 0
GASTROINTESTINAL NEGATIVE: 1
ABDOMINAL DISTENTION: 0
VOMITING: 0

## 2024-10-31 NOTE — PROGRESS NOTES
for arthralgias and joint swelling. Negative for gait problem and myalgias.   Skin: Negative.  Negative for color change and rash.   Neurological: Negative.  Negative for dizziness, weakness, numbness and headaches.   Psychiatric/Behavioral: Negative.  Negative for sleep disturbance.      Objective :   Resp 17   Ht 1.575 m (5' 2\")   Wt 83.5 kg (184 lb)   LMP  (LMP Unknown)   SpO2 98%   BMI 33.65 kg/m²  Body mass index is 33.65 kg/m².  Orthopedics:  GENERAL: Alert and oriented X3 in no acute distress.  SKIN: Intact without lesions or ulcerations.  NEURO: Intact to sensory and motor testing.   VASC: Capillary refill is less than 3 seconds.  MSK:  RLE  No ecchymoses, abrasions, deformity, or lacerations. Skin intact. Tender to palpation about medial and lateral joint lines. Compartments soft. EHL/FHL/TA/GSC motor intact. Sural, Saphenous, Superficial/Deep Peroneal, and Plantar Nerve distribution SILT. Foot warm and well perfused.  Stable to valgus and varus stress at 0 and 30 degrees of flexion; anterior and posterior drawer; lachman 1A.   AROM knee: 5-115     Radiology:  No new imaging obtained today.    Procedure: None    Assessment:      1. Primary osteoarthritis of right knee         Plan:   Assessment & Plan     -Patient's overall clinical course was reviewed today.  Due to recent hospitalization and surgery with respect to the patient's bowel obstruction, patient is disinclined to pursue a surgical option for her right knee pain.  -We discussed various conservative management options and because the patient received significant relief from her last steroid injection, we expressed that she could call at any time around the end of November 2024 to get another steroid injection.  We educated her that the timeframe is essentially 4 months but we also educated her that multiple injections in the joint carries significant risk for chondrolysis.  Patient expressed understanding and was amenable to plan.  -Ice and

## 2024-11-10 DIAGNOSIS — I10 ESSENTIAL HYPERTENSION: ICD-10-CM

## 2024-11-11 ENCOUNTER — TELEPHONE (OUTPATIENT)
Dept: PRIMARY CARE CLINIC | Age: 54
End: 2024-11-11

## 2024-11-11 DIAGNOSIS — M32.19 OTHER SYSTEMIC LUPUS ERYTHEMATOSUS WITH OTHER ORGAN INVOLVEMENT (HCC): ICD-10-CM

## 2024-11-11 RX ORDER — HYDROCODONE BITARTRATE AND ACETAMINOPHEN 5; 325 MG/1; MG/1
1 TABLET ORAL EVERY 8 HOURS PRN
Qty: 90 TABLET | Refills: 0 | Status: SHIPPED | OUTPATIENT
Start: 2024-11-11 | End: 2024-12-11

## 2024-11-14 RX ORDER — HYDROCHLOROTHIAZIDE 12.5 MG/1
12.5 TABLET ORAL DAILY
Qty: 90 TABLET | Refills: 1 | Status: SHIPPED | OUTPATIENT
Start: 2024-11-14

## 2024-11-18 ENCOUNTER — TELEPHONE (OUTPATIENT)
Dept: PRIMARY CARE CLINIC | Age: 54
End: 2024-11-18

## 2024-11-18 NOTE — TELEPHONE ENCOUNTER
Pt called in stating she just came off [employer] for 2 months. They used all her FMLA. Before she can use FMLA again she has to build her hours at work back up. She has to work 1250 hrs. She just wanted this to be in her chart as she will still need FMLA in future.

## 2024-11-21 DIAGNOSIS — M62.830 BACK SPASM: ICD-10-CM

## 2024-11-21 RX ORDER — CYCLOBENZAPRINE HCL 10 MG
TABLET ORAL
Qty: 30 TABLET | Refills: 5 | Status: SHIPPED | OUTPATIENT
Start: 2024-11-21

## 2024-12-09 ENCOUNTER — OFFICE VISIT (OUTPATIENT)
Dept: FAMILY MEDICINE CLINIC | Age: 54
End: 2024-12-09
Payer: COMMERCIAL

## 2024-12-09 VITALS
WEIGHT: 187 LBS | SYSTOLIC BLOOD PRESSURE: 122 MMHG | BODY MASS INDEX: 34.2 KG/M2 | HEART RATE: 71 BPM | OXYGEN SATURATION: 97 % | DIASTOLIC BLOOD PRESSURE: 80 MMHG

## 2024-12-09 DIAGNOSIS — I10 PRIMARY HYPERTENSION: Primary | ICD-10-CM

## 2024-12-09 PROCEDURE — 99213 OFFICE O/P EST LOW 20 MIN: CPT | Performed by: NURSE PRACTITIONER

## 2024-12-09 PROCEDURE — 3079F DIAST BP 80-89 MM HG: CPT | Performed by: NURSE PRACTITIONER

## 2024-12-09 PROCEDURE — 3074F SYST BP LT 130 MM HG: CPT | Performed by: NURSE PRACTITIONER

## 2024-12-09 ASSESSMENT — ENCOUNTER SYMPTOMS
COUGH: 0
SHORTNESS OF BREATH: 0

## 2024-12-09 NOTE — PROGRESS NOTES
Sarah Heard (:  1970) is a 54 y.o. female,Established patient, here for evaluation of the following chief complaint(s):  Hypertension         Assessment & Plan  Primary hypertension   Chronic, at goal (stable), continue current treatment plan           Return in about 3 months (around 3/9/2025).       Subjective   Hypertension  Pertinent negatives include no chest pain, palpitations or shortness of breath.       Review of Systems   Constitutional:  Negative for chills and fever.   Respiratory:  Negative for cough and shortness of breath.    Cardiovascular:  Negative for chest pain, palpitations and leg swelling.     Objective   Vitals:    24 1123   BP: 122/80   Pulse: 71   SpO2: 97%     Wt Readings from Last 3 Encounters:   24 84.8 kg (187 lb)   10/30/24 83.5 kg (184 lb)   24 92.4 kg (203 lb 9.6 oz)       Physical Exam  Constitutional:       Appearance: She is well-developed.   HENT:      Right Ear: External ear normal.      Left Ear: External ear normal.      Nose: Nose normal.   Cardiovascular:      Rate and Rhythm: Normal rate and regular rhythm.      Heart sounds: Normal heart sounds, S1 normal and S2 normal.   Pulmonary:      Effort: Pulmonary effort is normal. No respiratory distress.      Breath sounds: Normal breath sounds.   Musculoskeletal:         General: No deformity. Normal range of motion.      Cervical back: Full passive range of motion without pain and normal range of motion.   Skin:     General: Skin is warm and dry.   Neurological:      Mental Status: She is alert and oriented to person, place, and time.         An electronic signature was used to authenticate this note.    --ORVILLE VASQUEZ - CNP

## 2024-12-16 DIAGNOSIS — M32.19 OTHER SYSTEMIC LUPUS ERYTHEMATOSUS WITH OTHER ORGAN INVOLVEMENT (HCC): ICD-10-CM

## 2024-12-17 DIAGNOSIS — I10 ESSENTIAL HYPERTENSION: ICD-10-CM

## 2024-12-17 RX ORDER — HYDROCODONE BITARTRATE AND ACETAMINOPHEN 5; 325 MG/1; MG/1
1 TABLET ORAL EVERY 8 HOURS PRN
Qty: 90 TABLET | Refills: 0 | Status: SHIPPED | OUTPATIENT
Start: 2024-12-17 | End: 2025-01-16

## 2024-12-17 RX ORDER — NIFEDIPINE 60 MG/1
60 TABLET, EXTENDED RELEASE ORAL DAILY
Qty: 90 TABLET | Refills: 1 | Status: SHIPPED | OUTPATIENT
Start: 2024-12-17

## 2024-12-20 DIAGNOSIS — I10 ESSENTIAL HYPERTENSION: ICD-10-CM

## 2024-12-23 RX ORDER — METOPROLOL TARTRATE 75 MG/1
TABLET ORAL
Qty: 180 TABLET | Refills: 1 | Status: SHIPPED | OUTPATIENT
Start: 2024-12-23

## 2025-02-03 ENCOUNTER — TELEPHONE (OUTPATIENT)
Dept: PRIMARY CARE CLINIC | Age: 55
End: 2025-02-03

## 2025-02-03 DIAGNOSIS — M32.19 OTHER SYSTEMIC LUPUS ERYTHEMATOSUS WITH OTHER ORGAN INVOLVEMENT (HCC): ICD-10-CM

## 2025-02-03 RX ORDER — HYDROCODONE BITARTRATE AND ACETAMINOPHEN 5; 325 MG/1; MG/1
1 TABLET ORAL EVERY 8 HOURS PRN
Qty: 90 TABLET | Refills: 0 | Status: SHIPPED | OUTPATIENT
Start: 2025-02-03 | End: 2025-03-05

## 2025-03-08 DIAGNOSIS — E78.2 MIXED HYPERLIPIDEMIA: ICD-10-CM

## 2025-03-10 RX ORDER — SIMVASTATIN 20 MG
20 TABLET ORAL EVERY EVENING
Qty: 90 TABLET | Refills: 1 | OUTPATIENT
Start: 2025-03-10

## 2025-03-12 ENCOUNTER — OFFICE VISIT (OUTPATIENT)
Dept: PRIMARY CARE CLINIC | Age: 55
End: 2025-03-12
Payer: COMMERCIAL

## 2025-03-12 VITALS
HEIGHT: 62 IN | TEMPERATURE: 97.6 F | SYSTOLIC BLOOD PRESSURE: 120 MMHG | DIASTOLIC BLOOD PRESSURE: 80 MMHG | BODY MASS INDEX: 33.86 KG/M2 | OXYGEN SATURATION: 99 % | HEART RATE: 90 BPM | WEIGHT: 184 LBS

## 2025-03-12 DIAGNOSIS — F11.20 OPIOID DEPENDENCE WITH CURRENT USE (HCC): ICD-10-CM

## 2025-03-12 DIAGNOSIS — M32.19 OTHER SYSTEMIC LUPUS ERYTHEMATOSUS WITH OTHER ORGAN INVOLVEMENT (HCC): ICD-10-CM

## 2025-03-12 DIAGNOSIS — I10 ESSENTIAL HYPERTENSION: ICD-10-CM

## 2025-03-12 DIAGNOSIS — E78.2 MIXED HYPERLIPIDEMIA: ICD-10-CM

## 2025-03-12 PROCEDURE — 3079F DIAST BP 80-89 MM HG: CPT | Performed by: NURSE PRACTITIONER

## 2025-03-12 PROCEDURE — 3074F SYST BP LT 130 MM HG: CPT | Performed by: NURSE PRACTITIONER

## 2025-03-12 PROCEDURE — 99214 OFFICE O/P EST MOD 30 MIN: CPT | Performed by: NURSE PRACTITIONER

## 2025-03-12 RX ORDER — SIMVASTATIN 20 MG
20 TABLET ORAL EVERY EVENING
Qty: 90 TABLET | Refills: 1 | Status: SHIPPED | OUTPATIENT
Start: 2025-03-12

## 2025-03-12 RX ORDER — HYDROCHLOROTHIAZIDE 12.5 MG/1
12.5 TABLET ORAL DAILY
Qty: 90 TABLET | Refills: 1 | Status: SHIPPED | OUTPATIENT
Start: 2025-03-12

## 2025-03-12 ASSESSMENT — PATIENT HEALTH QUESTIONNAIRE - PHQ9
SUM OF ALL RESPONSES TO PHQ QUESTIONS 1-9: 0
2. FEELING DOWN, DEPRESSED OR HOPELESS: NOT AT ALL
1. LITTLE INTEREST OR PLEASURE IN DOING THINGS: NOT AT ALL
SUM OF ALL RESPONSES TO PHQ QUESTIONS 1-9: 0

## 2025-03-12 ASSESSMENT — ENCOUNTER SYMPTOMS
SHORTNESS OF BREATH: 0
COUGH: 0

## 2025-03-12 NOTE — PROGRESS NOTES
dry.   Neurological:      Mental Status: She is alert and oriented to person, place, and time.          Assessment/Plan:      1. Essential hypertension  The following orders have not been finalized:  Stable, chronic, continue medication.   -     hydroCHLOROthiazide 12.5 MG tablet  2. Opioid dependence with current use (HCC)  See pain management note below.     3. Other systemic lupus erythematosus with other organ involvement (HCC)  Follows up with rheum next week.     4. Mixed hyperlipidemia  The following orders have not been finalized:  -     simvastatin (ZOCOR) 20 MG tablet       Assessment & Plan  1. Pain management.  The patient reports ongoing pain despite an increase in methotrexate from 4 to 5 mg by her rheumatologist. She has not experienced significant swelling or knee popping since the last visit. She takes pain medication in the morning, midday, and before bed to manage her symptoms. She will continue her current pain management regimen and follow up with her rheumatologist for further evaluation, including potential kidney function tests.    2. Medication management.  A prescription for simvastatin will be sent to [facility].    Follow-up  The patient will follow up in 3 months.    Hemoglobin A1C   Date Value Ref Range Status   05/11/2024 6.1 (H) 4.0 - 6.0 % Final        No follow-ups on file.  No orders of the defined types were placed in this encounter.    No orders of the defined types were placed in this encounter.      The patient (or guardian, if applicable) and other individuals in attendance with the patient were advised that Artificial Intelligence will be utilized during this visit to record, process the conversation to generate a clinical note, and support improvement of the AI technology. The patient (or guardian, if applicable) and other individuals in attendance at the appointment consented to the use of AI, including the recording.      Reviewed health maintenance, prior labs and imaging.

## 2025-03-20 ENCOUNTER — TELEPHONE (OUTPATIENT)
Dept: PRIMARY CARE CLINIC | Age: 55
End: 2025-03-20

## 2025-03-20 DIAGNOSIS — M32.19 OTHER SYSTEMIC LUPUS ERYTHEMATOSUS WITH OTHER ORGAN INVOLVEMENT: ICD-10-CM

## 2025-03-20 RX ORDER — HYDROCODONE BITARTRATE AND ACETAMINOPHEN 5; 325 MG/1; MG/1
1 TABLET ORAL EVERY 8 HOURS PRN
Qty: 90 TABLET | Refills: 0 | Status: SHIPPED | OUTPATIENT
Start: 2025-03-20 | End: 2025-04-19

## 2025-04-13 DIAGNOSIS — E03.9 HYPOTHYROIDISM, UNSPECIFIED TYPE: ICD-10-CM

## 2025-04-14 RX ORDER — LEVOTHYROXINE SODIUM 100 UG/1
100 TABLET ORAL DAILY
Qty: 90 TABLET | Refills: 1 | Status: SHIPPED | OUTPATIENT
Start: 2025-04-14

## 2025-05-09 DIAGNOSIS — M62.830 BACK SPASM: ICD-10-CM

## 2025-05-09 RX ORDER — CYCLOBENZAPRINE HCL 10 MG
TABLET ORAL
Qty: 30 TABLET | Refills: 5 | Status: SHIPPED | OUTPATIENT
Start: 2025-05-09

## 2025-05-12 ENCOUNTER — TELEPHONE (OUTPATIENT)
Dept: PRIMARY CARE CLINIC | Age: 55
End: 2025-05-12

## 2025-05-12 DIAGNOSIS — M32.19 OTHER SYSTEMIC LUPUS ERYTHEMATOSUS WITH OTHER ORGAN INVOLVEMENT (HCC): ICD-10-CM

## 2025-05-12 RX ORDER — HYDROCODONE BITARTRATE AND ACETAMINOPHEN 5; 325 MG/1; MG/1
1 TABLET ORAL EVERY 8 HOURS PRN
Qty: 90 TABLET | Refills: 0 | Status: SHIPPED | OUTPATIENT
Start: 2025-05-12 | End: 2025-06-11

## 2025-05-28 ENCOUNTER — TELEPHONE (OUTPATIENT)
Dept: PRIMARY CARE CLINIC | Age: 55
End: 2025-05-28

## 2025-05-28 NOTE — TELEPHONE ENCOUNTER
LVM TO CALL BACK AND SCHEDULE AT EITHER [facility] OR [facility].

## 2025-06-15 DIAGNOSIS — I10 ESSENTIAL HYPERTENSION: ICD-10-CM

## 2025-06-16 ENCOUNTER — OFFICE VISIT (OUTPATIENT)
Dept: FAMILY MEDICINE CLINIC | Age: 55
End: 2025-06-16
Payer: COMMERCIAL

## 2025-06-16 VITALS
SYSTOLIC BLOOD PRESSURE: 132 MMHG | HEART RATE: 79 BPM | DIASTOLIC BLOOD PRESSURE: 80 MMHG | WEIGHT: 189 LBS | BODY MASS INDEX: 34.57 KG/M2 | OXYGEN SATURATION: 98 %

## 2025-06-16 DIAGNOSIS — E78.2 MIXED HYPERLIPIDEMIA: Primary | ICD-10-CM

## 2025-06-16 DIAGNOSIS — E03.9 HYPOTHYROIDISM, UNSPECIFIED TYPE: ICD-10-CM

## 2025-06-16 DIAGNOSIS — M32.19 OTHER SYSTEMIC LUPUS ERYTHEMATOSUS WITH OTHER ORGAN INVOLVEMENT (HCC): ICD-10-CM

## 2025-06-16 DIAGNOSIS — I10 UNCONTROLLED HYPERTENSION: ICD-10-CM

## 2025-06-16 DIAGNOSIS — Z86.39 HISTORY OF ELEVATED GLUCOSE: ICD-10-CM

## 2025-06-16 DIAGNOSIS — Z71.1 CONCERN ABOUT EYE DISEASE WITHOUT DIAGNOSIS: ICD-10-CM

## 2025-06-16 PROCEDURE — 99214 OFFICE O/P EST MOD 30 MIN: CPT | Performed by: NURSE PRACTITIONER

## 2025-06-16 PROCEDURE — 3079F DIAST BP 80-89 MM HG: CPT | Performed by: NURSE PRACTITIONER

## 2025-06-16 PROCEDURE — 3075F SYST BP GE 130 - 139MM HG: CPT | Performed by: NURSE PRACTITIONER

## 2025-06-16 RX ORDER — NIFEDIPINE 60 MG/1
60 TABLET, EXTENDED RELEASE ORAL DAILY
Qty: 90 TABLET | Refills: 1 | Status: SHIPPED | OUTPATIENT
Start: 2025-06-16

## 2025-06-16 RX ORDER — HYDROCODONE BITARTRATE AND ACETAMINOPHEN 5; 325 MG/1; MG/1
1 TABLET ORAL EVERY 8 HOURS PRN
Qty: 90 TABLET | Refills: 0 | Status: SHIPPED | OUTPATIENT
Start: 2025-06-16 | End: 2025-07-16

## 2025-06-16 NOTE — PROGRESS NOTES
Sarah Heard (:  1970) is a 54 y.o. female,Established patient, here for evaluation of the following chief complaint(s):  Hypertension and 3 Month Follow-Up      Assessment & Plan  Mixed hyperlipidemia       Orders:    Lipid, Fasting; Future    Uncontrolled hypertension   Chronic, at goal (stable), continue current treatment plan    Orders:    CBC; Future    Comprehensive Metabolic Panel; Future    Hypothyroidism, unspecified type       Orders:    TSH; Future    History of elevated glucose       Orders:    Hemoglobin A1C; Future    Other systemic lupus erythematosus with other organ involvement (HCC)   Chronic, waxes and wanes.     Orders:    HYDROcodone-acetaminophen (NORCO) 5-325 MG per tablet; Take 1 tablet by mouth every 8 hours as needed for Pain for up to 30 days. Take lowest dose possible to manage pain Max Daily Amount: 3 tablets    Concern about eye disease without diagnosis   Patient has been asked to make an apt with her eye doctor.            No follow-ups on file.       Subjective   Hypertension  Pertinent negatives include no chest pain, palpitations or shortness of breath.     Had to get a cortisone shot in her right knee.  Helped a little. Still following with rheum for her lupus.     Pt wears readers.  States last Friday she looked up and couldn't see a far away distance but she could see close which is normally opposite for her. It lasted for a day and then her vision returned to normal.  No other eye concerns.  She hasn't had an eye exam in over two years.      Review of Systems   Constitutional:  Negative for chills and fever.   Respiratory:  Negative for cough and shortness of breath.    Cardiovascular:  Negative for chest pain, palpitations and leg swelling.     Objective   Vitals:    25 1434   BP: 132/80   Pulse: 79   SpO2: 98%     Wt Readings from Last 3 Encounters:   25 85.7 kg (189 lb)   25 83.5 kg (184 lb)   24 84.8 kg (187 lb)       Physical

## 2025-06-16 NOTE — ASSESSMENT & PLAN NOTE
Chronic, waxes and wanes.     Orders:    HYDROcodone-acetaminophen (NORCO) 5-325 MG per tablet; Take 1 tablet by mouth every 8 hours as needed for Pain for up to 30 days. Take lowest dose possible to manage pain Max Daily Amount: 3 tablets

## 2025-07-03 DIAGNOSIS — M17.11 PRIMARY OSTEOARTHRITIS OF RIGHT KNEE: Primary | ICD-10-CM

## 2025-07-07 ENCOUNTER — HOSPITAL ENCOUNTER (OUTPATIENT)
Age: 55
Discharge: HOME | End: 2025-07-07
Payer: COMMERCIAL

## 2025-07-07 ENCOUNTER — RESULTS FOLLOW-UP (OUTPATIENT)
Dept: FAMILY MEDICINE CLINIC | Age: 55
End: 2025-07-07

## 2025-07-07 DIAGNOSIS — E78.2 MIXED HYPERLIPIDEMIA: ICD-10-CM

## 2025-07-07 DIAGNOSIS — E87.6 HYPOKALEMIA: ICD-10-CM

## 2025-07-07 DIAGNOSIS — E03.9 HYPOTHYROIDISM, UNSPECIFIED TYPE: ICD-10-CM

## 2025-07-07 DIAGNOSIS — I10 UNCONTROLLED HYPERTENSION: ICD-10-CM

## 2025-07-07 DIAGNOSIS — Z86.39 HISTORY OF ELEVATED GLUCOSE: ICD-10-CM

## 2025-07-07 LAB
ALBUMIN SERPL-MCNC: 4.6 G/DL (ref 3.5–5.2)
ALBUMIN/GLOB SERPL: 1.7 {RATIO} (ref 1–2.5)
ALP SERPL-CCNC: 87 U/L (ref 35–104)
ALT SERPL-CCNC: 31 U/L (ref 10–35)
ANION GAP SERPL CALCULATED.3IONS-SCNC: 16 MMOL/L (ref 9–16)
AST SERPL-CCNC: 23 U/L (ref 10–35)
BILIRUB SERPL-MCNC: 0.5 MG/DL (ref 0–1.2)
BUN SERPL-MCNC: 15 MG/DL (ref 6–20)
CALCIUM SERPL-MCNC: 10 MG/DL (ref 8.6–10.4)
CHLORIDE SERPL-SCNC: 100 MMOL/L (ref 98–107)
CHOLEST SERPL-MCNC: 250 MG/DL (ref 0–199)
CHOLESTEROL/HDL RATIO: 3.3
CO2 SERPL-SCNC: 27 MMOL/L (ref 20–31)
CREAT SERPL-MCNC: 0.8 MG/DL (ref 0.6–0.9)
CRP SERPL HS-MCNC: 5 MG/L (ref 0–5)
ERYTHROCYTE [DISTWIDTH] IN BLOOD BY AUTOMATED COUNT: 14.5 % (ref 11.8–14.4)
ERYTHROCYTE [SEDIMENTATION RATE] IN BLOOD BY PHOTOMETRIC METHOD: 24 MM/HR (ref 0–30)
EST. AVERAGE GLUCOSE BLD GHB EST-MCNC: 140 MG/DL
GFR, ESTIMATED: 86 ML/MIN/1.73M2
GLUCOSE SERPL-MCNC: 131 MG/DL (ref 74–99)
HBA1C MFR BLD: 6.5 % (ref 4–6)
HCT VFR BLD AUTO: 35.9 % (ref 36.3–47.1)
HDLC SERPL-MCNC: 75 MG/DL
HGB BLD-MCNC: 12.2 G/DL (ref 11.9–15.1)
LDLC SERPL CALC-MCNC: 114 MG/DL (ref 0–100)
MCH RBC QN AUTO: 30.7 PG (ref 25.2–33.5)
MCHC RBC AUTO-ENTMCNC: 34 G/DL (ref 28.4–34.8)
MCV RBC AUTO: 90.4 FL (ref 82.6–102.9)
NRBC BLD-RTO: 0 PER 100 WBC
PLATELET # BLD AUTO: 359 K/UL (ref 138–453)
PMV BLD AUTO: 10 FL (ref 8.1–13.5)
POTASSIUM SERPL-SCNC: 3.5 MMOL/L (ref 3.7–5.3)
PROT SERPL-MCNC: 7.3 G/DL (ref 6.6–8.7)
RBC # BLD AUTO: 3.97 M/UL (ref 3.95–5.11)
SODIUM SERPL-SCNC: 143 MMOL/L (ref 136–145)
TRIGL SERPL-MCNC: 304 MG/DL (ref 0–149)
TSH SERPL DL<=0.05 MIU/L-ACNC: 0.64 UIU/ML (ref 0.27–4.2)
URATE SERPL-MCNC: 7.3 MG/DL (ref 2.4–5.7)
VLDLC SERPL CALC-MCNC: 61 MG/DL (ref 1–30)
WBC OTHER # BLD: 7.4 K/UL (ref 3.5–11.3)

## 2025-07-07 PROCEDURE — 80061 LIPID PANEL: CPT

## 2025-07-07 PROCEDURE — 80053 COMPREHEN METABOLIC PANEL: CPT

## 2025-07-07 PROCEDURE — 85027 COMPLETE CBC AUTOMATED: CPT

## 2025-07-07 PROCEDURE — 36415 COLL VENOUS BLD VENIPUNCTURE: CPT

## 2025-07-07 PROCEDURE — 85652 RBC SED RATE AUTOMATED: CPT

## 2025-07-07 PROCEDURE — 86140 C-REACTIVE PROTEIN: CPT

## 2025-07-07 PROCEDURE — 83036 HEMOGLOBIN GLYCOSYLATED A1C: CPT

## 2025-07-07 PROCEDURE — 84550 ASSAY OF BLOOD/URIC ACID: CPT

## 2025-07-07 PROCEDURE — 84443 ASSAY THYROID STIM HORMONE: CPT

## 2025-07-09 ENCOUNTER — OFFICE VISIT (OUTPATIENT)
Dept: FAMILY MEDICINE CLINIC | Age: 55
End: 2025-07-09

## 2025-07-09 VITALS
HEIGHT: 62 IN | HEART RATE: 67 BPM | OXYGEN SATURATION: 96 % | DIASTOLIC BLOOD PRESSURE: 90 MMHG | SYSTOLIC BLOOD PRESSURE: 138 MMHG | RESPIRATION RATE: 20 BRPM | BODY MASS INDEX: 34.56 KG/M2

## 2025-07-09 DIAGNOSIS — M25.511 CHRONIC RIGHT SHOULDER PAIN: Primary | ICD-10-CM

## 2025-07-09 DIAGNOSIS — G89.29 CHRONIC RIGHT SHOULDER PAIN: Primary | ICD-10-CM

## 2025-07-09 DIAGNOSIS — R20.9 ABNORMAL ARM SENSATION: ICD-10-CM

## 2025-07-09 ASSESSMENT — ENCOUNTER SYMPTOMS
SHORTNESS OF BREATH: 0
COUGH: 0

## 2025-07-09 NOTE — PROGRESS NOTES
passive range of motion without pain and normal range of motion.   Skin:     General: Skin is warm and dry.   Neurological:      Mental Status: She is alert and oriented to person, place, and time.          Assessment/Plan:      1. Chronic right shoulder pain  -     XR SHOULDER RIGHT (MIN 2 VIEWS); Future  2. Abnormal arm sensation  -     EMG; Future       Assessment & Plan  1. Right arm pain.  - Symptoms suggest a possible muscle issue with the right arm.  - EMG of the right upper extremity ordered to investigate potential nerve-related problems.  - X-ray of the right shoulder ordered for further assessment.  - Patient reports tenderness and pain with movement of the shoulder.    2. Muscle spasms.  - Daily muscle spasms reported in various parts of the body, including the right arm and toes, ongoing for a year.  - Spasms occur during rest or minimal activity.  - Muscle relaxers have been used as needed for severe spasms, providing some relief.  - Further evaluation by a neurologist may be considered if symptoms persist.    Hemoglobin A1C   Date Value Ref Range Status   07/07/2025 6.5 (H) 4.0 - 6.0 % Final        No follow-ups on file.  Orders Placed This Encounter   Procedures    XR SHOULDER RIGHT (MIN 2 VIEWS)     Standing Status:   Future     Expected Date:   7/9/2025     Expiration Date:   11/9/2025     Reason for exam::   pain    EMG     If abnormalities are found in the ordered extremity(ies) that indicate a broader electro-diagnostic problem than initially suspected, the electro-diagnostician may complete additional testing of extremities not previously ordered to allow a definitive conclusion to the EMG study     Standing Status:   Future     Expected Date:   7/9/2025     Expiration Date:   9/7/2025     Which body part?:   Right Upper Extremity     No orders of the defined types were placed in this encounter.      The patient (or guardian, if applicable) and other individuals in attendance with the patient

## 2025-07-16 ENCOUNTER — HOSPITAL ENCOUNTER (OUTPATIENT)
Dept: GENERAL RADIOLOGY | Age: 55
Discharge: HOME | End: 2025-07-18
Payer: COMMERCIAL

## 2025-07-16 DIAGNOSIS — G89.29 CHRONIC RIGHT SHOULDER PAIN: ICD-10-CM

## 2025-07-16 DIAGNOSIS — M25.511 CHRONIC RIGHT SHOULDER PAIN: ICD-10-CM

## 2025-07-16 PROCEDURE — 73030 X-RAY EXAM OF SHOULDER: CPT

## 2025-08-01 DIAGNOSIS — M32.19 OTHER SYSTEMIC LUPUS ERYTHEMATOSUS WITH OTHER ORGAN INVOLVEMENT (HCC): ICD-10-CM

## 2025-08-04 RX ORDER — HYDROCODONE BITARTRATE AND ACETAMINOPHEN 5; 325 MG/1; MG/1
1 TABLET ORAL EVERY 8 HOURS PRN
Qty: 90 TABLET | Refills: 0 | Status: SHIPPED | OUTPATIENT
Start: 2025-08-04 | End: 2025-09-03

## 2025-08-27 ENCOUNTER — OFFICE VISIT (OUTPATIENT)
Dept: FAMILY MEDICINE CLINIC | Age: 55
End: 2025-08-27
Payer: COMMERCIAL

## 2025-08-27 VITALS — SYSTOLIC BLOOD PRESSURE: 112 MMHG | OXYGEN SATURATION: 98 % | DIASTOLIC BLOOD PRESSURE: 70 MMHG | HEART RATE: 73 BPM

## 2025-08-27 DIAGNOSIS — Z12.12 ENCOUNTER FOR SCREENING FOR COLORECTAL MALIGNANT NEOPLASM: Primary | ICD-10-CM

## 2025-08-27 DIAGNOSIS — Z12.11 ENCOUNTER FOR SCREENING FOR COLORECTAL MALIGNANT NEOPLASM: Primary | ICD-10-CM

## 2025-08-27 DIAGNOSIS — M25.522 LEFT ELBOW PAIN: ICD-10-CM

## 2025-08-27 PROCEDURE — 3078F DIAST BP <80 MM HG: CPT | Performed by: NURSE PRACTITIONER

## 2025-08-27 PROCEDURE — 99213 OFFICE O/P EST LOW 20 MIN: CPT | Performed by: NURSE PRACTITIONER

## 2025-08-27 PROCEDURE — 3074F SYST BP LT 130 MM HG: CPT | Performed by: NURSE PRACTITIONER

## 2025-08-27 ASSESSMENT — ENCOUNTER SYMPTOMS
SHORTNESS OF BREATH: 0
COUGH: 0

## 2025-09-02 DIAGNOSIS — I10 ESSENTIAL HYPERTENSION: ICD-10-CM

## 2025-09-02 RX ORDER — METOPROLOL TARTRATE 75 MG/1
1 TABLET ORAL 2 TIMES DAILY
Qty: 180 TABLET | Refills: 1 | Status: SHIPPED | OUTPATIENT
Start: 2025-09-02

## (undated) DEVICE — GAUZE,SPONGE,4"X4",16PLY,XRAY,STRL,LF: Brand: MEDLINE

## (undated) DEVICE — Device

## (undated) DEVICE — GLOVE SURG SZ 75 CRM LTX FREE POLYISOPRENE POLYMER BEAD ANTI

## (undated) DEVICE — SUTURE VICRYL + SZ 2-0 L27IN ABSRB WHT SH 1/2 CIR TAPERCUT VCP417H

## (undated) DEVICE — GLOVE SURG SZ 8 CRM LTX FREE POLYISOPRENE POLYMER BEAD ANTI

## (undated) DEVICE — SUTURE PROL SZ 1 L30IN NONABSORBABLE BLU L36MM CT-1 1/2 CIR 8425H

## (undated) DEVICE — SUTURE MONOCRYL SZ 3-0 L27IN ABSRB UD L26MM SH 1/2 CIR Y416H

## (undated) DEVICE — DRAPE,LAP,CHOLE,W/TROUGHS,STERILE: Brand: MEDLINE

## (undated) DEVICE — BINDER ABD UNISX 3 PNL E PREM CNTCT CLSR L XL 46INX62INX9IN

## (undated) DEVICE — 100% SILICONE FOLEY CATHETER,5-10 ML, 2-WAY: Brand: DOVER

## (undated) DEVICE — STRAP,CATHETER,ELASTIC,HOOK&LOOP: Brand: MEDLINE

## (undated) DEVICE — 4-PORT MANIFOLD: Brand: NEPTUNE 2

## (undated) DEVICE — GAUZE,SPONGE,FLUFF,6"X6.75",STRL,5/TRAY: Brand: MEDLINE

## (undated) DEVICE — SUTURE PROL SZ 4-0 L30IN NONABSORBABLE BLU SH L26MM 1/2 CIR 8831H

## (undated) DEVICE — SPONGE LAP W18XL18IN WHT COT 4 PLY FLD STRUNG RADPQ DISP ST 2 PER PACK

## (undated) DEVICE — SEALER ENDOSCP NANO COAT OPN DIV CRV L JAW LIGASURE IMPACT

## (undated) DEVICE — STAZ MAJOR BASIN: Brand: MEDLINE INDUSTRIES, INC.

## (undated) DEVICE — SUTURE VICRYL COAT  + LIGAPAK LIG REEL 54 IN SZ 0 UD BRAID VCP287G

## (undated) DEVICE — SUTURE PROL SZ 1 L30IN NONABSORBABLE BLU CTX L48MM 1/2 CIR 8455H

## (undated) DEVICE — DRAPE,REIN 53X77,STERILE: Brand: MEDLINE

## (undated) DEVICE — SUTURE VICRYL + SZ 3-0 L27IN ABSRB UD L26MM SH 1/2 CIR VCP416H

## (undated) DEVICE — WOUND RETRACTOR AND PROTECTOR: Brand: ALEXIS O WOUND PROTECTOR-RETRACTOR

## (undated) DEVICE — PAD,NON-ADHERENT,3X8,STERILE,LF,1/PK: Brand: MEDLINE

## (undated) DEVICE — PAD,ABDOMINAL,5"X9",ST,LF,25/BX: Brand: MEDLINE INDUSTRIES, INC.